# Patient Record
Sex: FEMALE | Race: WHITE | Employment: PART TIME | URBAN - METROPOLITAN AREA
[De-identification: names, ages, dates, MRNs, and addresses within clinical notes are randomized per-mention and may not be internally consistent; named-entity substitution may affect disease eponyms.]

---

## 2018-04-17 ENCOUNTER — APPOINTMENT (OUTPATIENT)
Dept: CT IMAGING | Age: 38
End: 2018-04-17
Attending: EMERGENCY MEDICINE
Payer: COMMERCIAL

## 2018-04-17 ENCOUNTER — APPOINTMENT (OUTPATIENT)
Dept: GENERAL RADIOLOGY | Age: 38
End: 2018-04-17
Attending: EMERGENCY MEDICINE
Payer: COMMERCIAL

## 2018-04-17 ENCOUNTER — HOSPITAL ENCOUNTER (EMERGENCY)
Age: 38
Discharge: HOME | End: 2018-04-17
Attending: EMERGENCY MEDICINE
Payer: COMMERCIAL

## 2018-04-17 VITALS
WEIGHT: 293 LBS | DIASTOLIC BLOOD PRESSURE: 83 MMHG | HEART RATE: 96 BPM | BODY MASS INDEX: 51.91 KG/M2 | TEMPERATURE: 98.2 F | HEIGHT: 63 IN | OXYGEN SATURATION: 96 % | RESPIRATION RATE: 23 BRPM | SYSTOLIC BLOOD PRESSURE: 157 MMHG

## 2018-04-17 DIAGNOSIS — J01.00 ACUTE NON-RECURRENT MAXILLARY SINUSITIS: Primary | ICD-10-CM

## 2018-04-17 LAB
ALBUMIN SERPL-MCNC: 2.7 G/DL (ref 3.5–5)
ALBUMIN/GLOB SERPL: 0.5 {RATIO} (ref 1.1–2.2)
ALP SERPL-CCNC: 63 U/L (ref 45–117)
ALT SERPL-CCNC: 18 U/L (ref 12–78)
ANION GAP SERPL CALC-SCNC: 6 MMOL/L (ref 5–15)
AST SERPL-CCNC: 12 U/L (ref 15–37)
ATRIAL RATE: 95 BPM
BASOPHILS # BLD: 0 K/UL (ref 0–0.1)
BASOPHILS NFR BLD: 0 % (ref 0–1)
BILIRUB SERPL-MCNC: 0.3 MG/DL (ref 0.2–1)
BUN SERPL-MCNC: 6 MG/DL (ref 6–20)
BUN/CREAT SERPL: 8 (ref 12–20)
CALCIUM SERPL-MCNC: 8.9 MG/DL (ref 8.5–10.1)
CALCULATED P AXIS, ECG09: 57 DEGREES
CALCULATED R AXIS, ECG10: 8 DEGREES
CALCULATED T AXIS, ECG11: 44 DEGREES
CHLORIDE SERPL-SCNC: 98 MMOL/L (ref 97–108)
CO2 SERPL-SCNC: 31 MMOL/L (ref 21–32)
CREAT SERPL-MCNC: 0.76 MG/DL (ref 0.55–1.02)
D DIMER PPP FEU-MCNC: 1.86 MG/L FEU (ref 0–0.65)
DIAGNOSIS, 93000: NORMAL
DIFFERENTIAL METHOD BLD: ABNORMAL
EOSINOPHIL # BLD: 0.1 K/UL (ref 0–0.4)
EOSINOPHIL NFR BLD: 1 % (ref 0–7)
ERYTHROCYTE [DISTWIDTH] IN BLOOD BY AUTOMATED COUNT: 16.4 % (ref 11.5–14.5)
GLOBULIN SER CALC-MCNC: 5.6 G/DL (ref 2–4)
GLUCOSE SERPL-MCNC: 103 MG/DL (ref 65–100)
HCT VFR BLD AUTO: 39.2 % (ref 35–47)
HGB BLD-MCNC: 12 G/DL (ref 11.5–16)
LYMPHOCYTES # BLD: 1 K/UL (ref 0.8–3.5)
LYMPHOCYTES NFR BLD: 8 % (ref 12–49)
MCH RBC QN AUTO: 24.3 PG (ref 26–34)
MCHC RBC AUTO-ENTMCNC: 30.6 G/DL (ref 30–36.5)
MCV RBC AUTO: 79.5 FL (ref 80–99)
MONOCYTES # BLD: 0.8 K/UL (ref 0–1)
MONOCYTES NFR BLD: 6 % (ref 5–13)
NEUTS SEG # BLD: 10.8 K/UL (ref 1.8–8)
NEUTS SEG NFR BLD: 85 % (ref 32–75)
P-R INTERVAL, ECG05: 144 MS
PLATELET # BLD AUTO: 400 K/UL (ref 150–400)
PMV BLD AUTO: 9.5 FL (ref 8.9–12.9)
POTASSIUM SERPL-SCNC: 3.3 MMOL/L (ref 3.5–5.1)
PROT SERPL-MCNC: 8.3 G/DL (ref 6.4–8.2)
Q-T INTERVAL, ECG07: 344 MS
QRS DURATION, ECG06: 92 MS
QTC CALCULATION (BEZET), ECG08: 432 MS
RBC # BLD AUTO: 4.93 M/UL (ref 3.8–5.2)
RBC MORPH BLD: ABNORMAL
SODIUM SERPL-SCNC: 135 MMOL/L (ref 136–145)
TROPONIN I BLD-MCNC: 0.06 NG/ML (ref 0–0.08)
VENTRICULAR RATE, ECG03: 95 BPM
WBC # BLD AUTO: 12.7 K/UL (ref 3.6–11)
XXWBCSUS: 1

## 2018-04-17 PROCEDURE — 36415 COLL VENOUS BLD VENIPUNCTURE: CPT | Performed by: EMERGENCY MEDICINE

## 2018-04-17 PROCEDURE — 93005 ELECTROCARDIOGRAM TRACING: CPT

## 2018-04-17 PROCEDURE — 71275 CT ANGIOGRAPHY CHEST: CPT

## 2018-04-17 PROCEDURE — 71046 X-RAY EXAM CHEST 2 VIEWS: CPT

## 2018-04-17 PROCEDURE — 96374 THER/PROPH/DIAG INJ IV PUSH: CPT

## 2018-04-17 PROCEDURE — 96361 HYDRATE IV INFUSION ADD-ON: CPT

## 2018-04-17 PROCEDURE — 85379 FIBRIN DEGRADATION QUANT: CPT | Performed by: EMERGENCY MEDICINE

## 2018-04-17 PROCEDURE — 96375 TX/PRO/DX INJ NEW DRUG ADDON: CPT

## 2018-04-17 PROCEDURE — 74011250636 HC RX REV CODE- 250/636: Performed by: EMERGENCY MEDICINE

## 2018-04-17 PROCEDURE — 99285 EMERGENCY DEPT VISIT HI MDM: CPT

## 2018-04-17 PROCEDURE — 85025 COMPLETE CBC W/AUTO DIFF WBC: CPT | Performed by: EMERGENCY MEDICINE

## 2018-04-17 PROCEDURE — 80053 COMPREHEN METABOLIC PANEL: CPT | Performed by: EMERGENCY MEDICINE

## 2018-04-17 PROCEDURE — 74011636320 HC RX REV CODE- 636/320: Performed by: EMERGENCY MEDICINE

## 2018-04-17 PROCEDURE — 70450 CT HEAD/BRAIN W/O DYE: CPT

## 2018-04-17 PROCEDURE — 84484 ASSAY OF TROPONIN QUANT: CPT

## 2018-04-17 RX ORDER — KETOROLAC TROMETHAMINE 30 MG/ML
30 INJECTION, SOLUTION INTRAMUSCULAR; INTRAVENOUS
Status: COMPLETED | OUTPATIENT
Start: 2018-04-17 | End: 2018-04-17

## 2018-04-17 RX ORDER — AMOXICILLIN AND CLAVULANATE POTASSIUM 875; 125 MG/1; MG/1
1 TABLET, FILM COATED ORAL 2 TIMES DAILY
Qty: 14 TAB | Refills: 0 | Status: SHIPPED | OUTPATIENT
Start: 2018-04-17 | End: 2018-04-24

## 2018-04-17 RX ORDER — DIPHENHYDRAMINE HYDROCHLORIDE 50 MG/ML
25 INJECTION, SOLUTION INTRAMUSCULAR; INTRAVENOUS
Status: COMPLETED | OUTPATIENT
Start: 2018-04-17 | End: 2018-04-17

## 2018-04-17 RX ORDER — METOCLOPRAMIDE HYDROCHLORIDE 5 MG/ML
10 INJECTION INTRAMUSCULAR; INTRAVENOUS
Status: COMPLETED | OUTPATIENT
Start: 2018-04-17 | End: 2018-04-17

## 2018-04-17 RX ORDER — IBUPROFEN 800 MG/1
800 TABLET ORAL
Qty: 20 TAB | Refills: 0 | Status: SHIPPED | OUTPATIENT
Start: 2018-04-17 | End: 2018-04-24

## 2018-04-17 RX ADMIN — IOPAMIDOL 100 ML: 755 INJECTION, SOLUTION INTRAVENOUS at 11:46

## 2018-04-17 RX ADMIN — METOCLOPRAMIDE 10 MG: 5 INJECTION, SOLUTION INTRAMUSCULAR; INTRAVENOUS at 10:26

## 2018-04-17 RX ADMIN — DIPHENHYDRAMINE HYDROCHLORIDE 25 MG: 50 INJECTION, SOLUTION INTRAMUSCULAR; INTRAVENOUS at 10:24

## 2018-04-17 RX ADMIN — SODIUM CHLORIDE 1000 ML: 900 INJECTION, SOLUTION INTRAVENOUS at 10:23

## 2018-04-17 RX ADMIN — KETOROLAC TROMETHAMINE 30 MG: 30 INJECTION, SOLUTION INTRAMUSCULAR at 11:44

## 2018-04-17 NOTE — ED PROVIDER NOTES
Patient is a 40 y.o. female presenting with chest pain and general illness. The history is provided by the patient. Chest Pain    This is a new problem. Episode onset: 1 week ago. The problem has not changed since onset. Associated with: supine. The pain is present in the substernal region. The pain is at a severity of 8/10. The pain is moderate. The quality of the pain is described as pressure-like. The pain does not radiate. The symptoms are aggravated by certain positions. Associated symptoms include a fever, headaches, nausea and shortness of breath. Pertinent negatives include no abdominal pain, no back pain and no vomiting. She has tried nothing for the symptoms. The treatment provided no relief. Risk factors: prior unprovoked PE. Her past medical history is significant for PE. Generalized Body Aches   Associated symptoms include chest pain, headaches and shortness of breath. Pertinent negatives include no abdominal pain. Past Medical History:   Diagnosis Date    Hypertension     Ill-defined condition     PE    Panic attack     Psychiatric disorder     Pulmonary embolism (HCC)     Urticaria        Past Surgical History:   Procedure Laterality Date    HX DILATION AND CURETTAGE           Family History:   Problem Relation Age of Onset    Stroke Other        Social History     Social History    Marital status: SINGLE     Spouse name: N/A    Number of children: N/A    Years of education: N/A     Occupational History    Not on file. Social History Main Topics    Smoking status: Current Every Day Smoker     Packs/day: 0.50    Smokeless tobacco: Never Used    Alcohol use No      Comment: rarely    Drug use: Yes     Special: Marijuana    Sexual activity: Not on file     Other Topics Concern    Not on file     Social History Narrative         ALLERGIES: Hydrocodone    Review of Systems   Constitutional: Positive for fever. Negative for chills.    HENT: Negative for ear pain and sore throat. Eyes: Negative for pain. Respiratory: Positive for shortness of breath. Negative for chest tightness. Cardiovascular: Positive for chest pain. Negative for leg swelling. Gastrointestinal: Positive for nausea. Negative for abdominal pain and vomiting. Genitourinary: Negative for dysuria and flank pain. Musculoskeletal: Negative for back pain. Skin: Negative for rash. Neurological: Positive for headaches. All other systems reviewed and are negative. Vitals:    04/17/18 0948   BP: (!) 160/97   Pulse: 95   Resp: 26   Temp: 98.2 °F (36.8 °C)   SpO2: 98%   Weight: 137 kg (302 lb)   Height: 5' 3\" (1.6 m)            Physical Exam   Constitutional: She is oriented to person, place, and time. She appears well-developed and well-nourished. Obese female   HENT:   Head: Normocephalic and atraumatic. Mouth/Throat: Oropharynx is clear and moist.   Eyes: Conjunctivae and EOM are normal. Pupils are equal, round, and reactive to light. No scleral icterus. Neck: Neck supple. No tracheal deviation present. Cardiovascular: Regular rhythm, normal heart sounds and intact distal pulses. Exam reveals no gallop and no friction rub. No murmur heard. Tachycardia with standing   Pulmonary/Chest: Effort normal and breath sounds normal. No respiratory distress. Abdominal: Soft. She exhibits no distension. There is no tenderness. There is no rebound and no guarding. Genitourinary:   Genitourinary Comments: deferred   Musculoskeletal: She exhibits no edema. Neurological: She is alert and oriented to person, place, and time. No cranial nerve deficit. Coordination normal.   No pronator drift. Negative Romberg   Skin: Skin is warm and dry. Psychiatric: She has a normal mood and affect. Nursing note and vitals reviewed. MDM  Number of Diagnoses or Management Options        ED Course       Procedures      A/P: 41 yo female with h/o PE, obesity presents with headache, chest pain, body aches. D-dimer +, CTA negative. CT head showed maxillary sinus disease and incidental cerebellar tonsil enlargement, discussed with patient. Pain relieved with migraine cocktail.    - Augmentin  - Ibuprofen  - f/u with PCP  - Return to the emergency department for new/ worsening symptoms or other concerns    Cem Valera.  Savanna Bowman MD

## 2018-04-17 NOTE — ED TRIAGE NOTES
Patient presents to treatment area via wheelchair. Patient states that about one week PTA, she developed posterior \"head pressure\". Pain has progressed into all of her joints and she developed a rash. Patient states she began with chest pain about a week ago, also. Pain has increased since onset. Patient states she has not eaten in two days. She has been short of breath and nauseated since symptom onset.

## 2018-04-17 NOTE — ED NOTES
Pt returned from CT . Purposeful rounding done. Pt sitting up on stretcher. Offered assist with any needs. Pt states \"pain level 4 head and no needs at this time. \" Pt medicated with Toradol as ordered. Call bell in reach will continue to monitor.

## 2018-04-17 NOTE — ED NOTES
Plan of care and all test/meds ordered explained to pt. Good understanding and agreement with plan was verbalized. Pt medicated as ordered. Call bell in reach use explained.

## 2018-04-17 NOTE — ED NOTES
Purposeful rounding done. Pt sitting up on stretcher. Offered assist with any needs. Pt states \"pain level 1 and no needs at this time. \" Pt ambulated to the bathroom with a steady gait. Call bell in reach will continue to monitor.

## 2018-04-17 NOTE — DISCHARGE INSTRUCTIONS
Sinusitis: Care Instructions  Your Care Instructions    Sinusitis is an infection of the lining of the sinus cavities in your head. Sinusitis often follows a cold. It causes pain and pressure in your head and face. In most cases, sinusitis gets better on its own in 1 to 2 weeks. But some mild symptoms may last for several weeks. Sometimes antibiotics are needed. Follow-up care is a key part of your treatment and safety. Be sure to make and go to all appointments, and call your doctor if you are having problems. It's also a good idea to know your test results and keep a list of the medicines you take. How can you care for yourself at home? · Take an over-the-counter pain medicine, such as acetaminophen (Tylenol), ibuprofen (Advil, Motrin), or naproxen (Aleve). Read and follow all instructions on the label. · If the doctor prescribed antibiotics, take them as directed. Do not stop taking them just because you feel better. You need to take the full course of antibiotics. · Be careful when taking over-the-counter cold or flu medicines and Tylenol at the same time. Many of these medicines have acetaminophen, which is Tylenol. Read the labels to make sure that you are not taking more than the recommended dose. Too much acetaminophen (Tylenol) can be harmful. · Breathe warm, moist air from a steamy shower, a hot bath, or a sink filled with hot water. Avoid cold, dry air. Using a humidifier in your home may help. Follow the directions for cleaning the machine. · Use saline (saltwater) nasal washes to help keep your nasal passages open and wash out mucus and bacteria. You can buy saline nose drops at a grocery store or drugstore. Or you can make your own at home by adding 1 teaspoon of salt and 1 teaspoon of baking soda to 2 cups of distilled water. If you make your own, fill a bulb syringe with the solution, insert the tip into your nostril, and squeeze gently. Lunmac[place] Parisian your nose.   · Put a hot, wet towel or a warm gel pack on your face 3 or 4 times a day for 5 to 10 minutes each time. · Try a decongestant nasal spray like oxymetazoline (Afrin). Do not use it for more than 3 days in a row. Using it for more than 3 days can make your congestion worse. When should you call for help? Call your doctor now or seek immediate medical care if:  ? · You have new or worse swelling or redness in your face or around your eyes. ? · You have a new or higher fever. ? Watch closely for changes in your health, and be sure to contact your doctor if:  ? · You have new or worse facial pain. ? · The mucus from your nose becomes thicker (like pus) or has new blood in it. ? · You are not getting better as expected. Where can you learn more? Go to http://randi-ned.info/. Enter B776 in the search box to learn more about \"Sinusitis: Care Instructions. \"  Current as of: May 12, 2017  Content Version: 11.4  © 6152-1700 Healthwise, Incorporated. Care instructions adapted under license by [employer] (which disclaims liability or warranty for this information). If you have questions about a medical condition or this instruction, always ask your healthcare professional. [address] any warranty or liability for your use of this information.

## 2018-04-17 NOTE — ED NOTES
Purposeful rounding done. Pt sitting up on stretcher. Offered assist with any needs. Pt states \"pain level 4 and can I have some water? \" Pt given water as requested okayed by Dr Toni Reina. Call bell in reach will continue to monitor.

## 2018-06-03 ENCOUNTER — HOSPITAL ENCOUNTER (EMERGENCY)
Age: 38
Discharge: HOME | End: 2018-06-03
Attending: STUDENT IN AN ORGANIZED HEALTH CARE EDUCATION/TRAINING PROGRAM | Admitting: STUDENT IN AN ORGANIZED HEALTH CARE EDUCATION/TRAINING PROGRAM
Payer: COMMERCIAL

## 2018-06-03 VITALS
HEART RATE: 93 BPM | WEIGHT: 293 LBS | HEIGHT: 63 IN | RESPIRATION RATE: 16 BRPM | BODY MASS INDEX: 51.91 KG/M2 | TEMPERATURE: 98.1 F | DIASTOLIC BLOOD PRESSURE: 76 MMHG | OXYGEN SATURATION: 97 % | SYSTOLIC BLOOD PRESSURE: 145 MMHG

## 2018-06-03 DIAGNOSIS — H10.9 CONJUNCTIVITIS OF RIGHT EYE, UNSPECIFIED CONJUNCTIVITIS TYPE: Primary | ICD-10-CM

## 2018-06-03 PROCEDURE — 74011000250 HC RX REV CODE- 250: Performed by: STUDENT IN AN ORGANIZED HEALTH CARE EDUCATION/TRAINING PROGRAM

## 2018-06-03 PROCEDURE — 99283 EMERGENCY DEPT VISIT LOW MDM: CPT

## 2018-06-03 RX ORDER — LISINOPRIL 10 MG/1
20 TABLET ORAL DAILY
COMMUNITY
End: 2019-12-16

## 2018-06-03 RX ORDER — TETRACAINE HYDROCHLORIDE 5 MG/ML
2 SOLUTION OPHTHALMIC
Status: COMPLETED | OUTPATIENT
Start: 2018-06-03 | End: 2018-06-03

## 2018-06-03 RX ORDER — ERYTHROMYCIN 5 MG/G
OINTMENT OPHTHALMIC
Qty: 1 TUBE | Refills: 0 | Status: SHIPPED | OUTPATIENT
Start: 2018-06-03 | End: 2018-06-10

## 2018-06-03 RX ORDER — FLUOXETINE HYDROCHLORIDE 20 MG/1
60 CAPSULE ORAL DAILY
COMMUNITY
End: 2018-11-01

## 2018-06-03 RX ADMIN — TETRACAINE HYDROCHLORIDE 2 DROP: 5 SOLUTION OPHTHALMIC at 08:49

## 2018-06-03 RX ADMIN — FLUORESCEIN SODIUM 1 STRIP: 0.6 STRIP OPHTHALMIC at 08:49

## 2018-06-03 NOTE — DISCHARGE INSTRUCTIONS
Pinkeye: Care Instructions  Your Care Instructions    Pinkeye is redness and swelling of the eye surface and the conjunctiva (the lining of the eyelid and the covering of the white part of the eye). Pinkeye is also called conjunctivitis. Pinkeye is often caused by infection with bacteria or a virus. Dry air, allergies, smoke, and chemicals are other common causes. Pinkeye often clears on its own in 7 to 10 days. Antibiotics only help if the pinkeye is caused by bacteria. Pinkeye caused by infection spreads easily. If an allergy or chemical is causing pinkeye, it will not go away unless you can avoid whatever is causing it. Follow-up care is a key part of your treatment and safety. Be sure to make and go to all appointments, and call your doctor if you are having problems. It's also a good idea to know your test results and keep a list of the medicines you take. How can you care for yourself at home? · Wash your hands often. Always wash them before and after you treat pinkeye or touch your eyes or face. · Use moist cotton or a clean, wet cloth to remove crust. Wipe from the inside corner of the eye to the outside. Use a clean part of the cloth for each wipe. · Put cold or warm wet cloths on your eye a few times a day if the eye hurts. · Do not wear contact lenses or eye makeup until the pinkeye is gone. Throw away any eye makeup you were using when you got pinkeye. Clean your contacts and storage case. If you wear disposable contacts, use a new pair when your eye has cleared and it is safe to wear contacts again. · If the doctor gave you antibiotic ointment or eyedrops, use them as directed. Use the medicine for as long as instructed, even if your eye starts looking better soon. Keep the bottle tip clean, and do not let it touch the eye area. · To put in eyedrops or ointment:  ¨ Tilt your head back, and pull your lower eyelid down with one finger.   ¨ Drop or squirt the medicine inside the lower lid.  ¨ Close your eye for 30 to 60 seconds to let the drops or ointment move around. ¨ Do not touch the ointment or dropper tip to your eyelashes or any other surface. · Do not share towels, pillows, or washcloths while you have pinkeye. When should you call for help? Call your doctor now or seek immediate medical care if:  ? · You have pain in your eye, not just irritation on the surface. ? · You have a change in vision or loss of vision. ? · You have an increase in discharge from the eye.   ? · Your eye has not started to improve or begins to get worse within 48 hours after you start using antibiotics. ? · Pinkeye lasts longer than 7 days. ? Watch closely for changes in your health, and be sure to contact your doctor if you have any problems. Where can you learn more? Go to http://randi-ned.info/. Enter Y392 in the search box to learn more about \"Pinkeye: Care Instructions. \"  Current as of: March 20, 2017  Content Version: 11.4  © 6084-8160 Healthwise, Incorporated. Care instructions adapted under license by [employer] (which disclaims liability or warranty for this information). If you have questions about a medical condition or this instruction, always ask your healthcare professional. [address] any warranty or liability for your use of this information.

## 2018-06-03 NOTE — ED PROVIDER NOTES
Patient is a 40 y.o. female presenting with eye problem. The history is provided by the patient. Eye Problem    This is a new problem. The current episode started yesterday. The problem occurs constantly. The problem has not changed since onset. The right eye is affected. The injury mechanism was none (woke up from sleep with red, painful R eye). The pain is at a severity of 4/10. The pain is mild. There is no history of trauma to the eye. There is no known exposure to pink eye. She does not wear contacts (supposed to wear glasses but doesn't). Associated symptoms include blurred vision (on R), discharge (clear), foreign body sensation, photophobia, eye redness, itching and pain. Pertinent negatives include no decreased vision, no double vision, no nausea, no vomiting, no fever, no blindness and no head injury. Associated symptoms comments: Denies headache  . She has tried water and eye drops for the symptoms. The treatment provided no relief. Past Medical History:   Diagnosis Date    Hypertension     Ill-defined condition     PE    Panic attack     Psychiatric disorder     Pulmonary embolism (HCC)     Urticaria        Past Surgical History:   Procedure Laterality Date    HX DILATION AND CURETTAGE           Family History:   Problem Relation Age of Onset    Stroke Other        Social History     Social History    Marital status: SINGLE     Spouse name: N/A    Number of children: N/A    Years of education: N/A     Occupational History    Not on file. Social History Main Topics    Smoking status: Current Every Day Smoker     Packs/day: 0.25    Smokeless tobacco: Never Used    Alcohol use No      Comment: rarely    Drug use: Yes     Special: Marijuana    Sexual activity: Not on file     Other Topics Concern    Not on file     Social History Narrative         ALLERGIES: Hydrocodone    Review of Systems   Constitutional: Negative for fever.    HENT: Negative for facial swelling, mouth sores, rhinorrhea and sore throat. Eyes: Positive for blurred vision (on R), photophobia, pain, discharge (clear), redness, itching and visual disturbance (blurred vision on R). Negative for blindness and double vision. Denies diplopia     Cardiovascular: Negative for chest pain and leg swelling. Gastrointestinal: Negative for abdominal pain, nausea and vomiting. Musculoskeletal: Negative for arthralgias and joint swelling. Skin: Positive for itching. Neurological: Negative for headaches. All other systems reviewed and are negative. Vitals:    06/03/18 0832   BP: 145/76   Pulse: 93   Resp: 16   Temp: 98.1 °F (36.7 °C)   SpO2: 97%   Weight: 135 kg (297 lb 9.9 oz)   Height: 5' 3\" (1.6 m)            Physical Exam   Constitutional: She is oriented to person, place, and time. She appears well-developed. No distress. HENT:   Head: Normocephalic and atraumatic. Eyes: EOM are normal. Pupils are equal, round, and reactive to light. Right eye exhibits chemosis and discharge (clear). Right eye exhibits no exudate. No foreign body present in the right eye. Right conjunctiva is injected. Right conjunctiva has no hemorrhage. Left conjunctiva is not injected. Left conjunctiva has no hemorrhage. Slit lamp exam:       The right eye shows no hyphema. Neck: Normal range of motion. Neck supple. Cardiovascular: Normal rate, regular rhythm and normal heart sounds. No murmur heard. Pulmonary/Chest: Effort normal and breath sounds normal. No respiratory distress. Abdominal: Soft. Bowel sounds are normal. She exhibits no distension. There is no tenderness. There is no rebound. Musculoskeletal: Normal range of motion. She exhibits no edema. Neurological: She is alert and oriented to person, place, and time. No cranial nerve deficit. She exhibits normal muscle tone. Coordination normal.   Skin: Skin is warm and dry. No rash noted. Psychiatric: She has a normal mood and affect.  Her behavior is normal. Nursing note and vitals reviewed. [facility]      ED Course       Eye Stain    Date/Time: 6/3/2018 8:59 AM    Performed by: attending        Corneal abrasion was not present on eyelid eversion. Cornea is clear. Patient tolerance: Patient tolerated the procedure well with no immediate complications  My total time at bedside, performing this procedure was 1-15 minutes. Comments: Tetracaine 0.5% applied. No signs of acute glaucoma or orbital cellulitis. Will need ophtho follow up.

## 2018-06-03 NOTE — ED TRIAGE NOTES
Patient ambulatory to ED treatment area with steady gait for complaint of \"two days ago my right eye became red. I thought it may be pink eye but it does not feel like it. \" Pt denies crusting of the eye. Reports blurriness to right eye. Pressure to right eye reported. Photosensitivity in right eye. Pressure also to right side of face.

## 2018-06-03 NOTE — LETTER
[facility] EMERGENCY DEPT 
[address] [id number] 
[phone] Work/School Note Date: 6/3/2018 To Whom It May concern: 
 
Cody Patel was seen and treated today in the emergency room by the following provider(s): 
Attending Provider: John Sanders MD. Cody Patel may return to work on 6/5/2018.  
 
Sincerely, 
 
 
 
 
John Sanders MD

## 2018-11-01 ENCOUNTER — HOSPITAL ENCOUNTER (EMERGENCY)
Age: 38
Discharge: HOME | End: 2018-11-01
Attending: EMERGENCY MEDICINE
Payer: SELF-PAY

## 2018-11-01 VITALS
OXYGEN SATURATION: 98 % | SYSTOLIC BLOOD PRESSURE: 160 MMHG | RESPIRATION RATE: 18 BRPM | TEMPERATURE: 98.3 F | DIASTOLIC BLOOD PRESSURE: 72 MMHG | HEART RATE: 97 BPM | WEIGHT: 293 LBS | BODY MASS INDEX: 58.07 KG/M2

## 2018-11-01 DIAGNOSIS — K08.89 PAIN, DENTAL: ICD-10-CM

## 2018-11-01 DIAGNOSIS — G50.1 ATYPICAL FACE PAIN: Primary | ICD-10-CM

## 2018-11-01 PROCEDURE — 74011000250 HC RX REV CODE- 250: Performed by: EMERGENCY MEDICINE

## 2018-11-01 PROCEDURE — 99283 EMERGENCY DEPT VISIT LOW MDM: CPT

## 2018-11-01 PROCEDURE — 74011250637 HC RX REV CODE- 250/637: Performed by: EMERGENCY MEDICINE

## 2018-11-01 RX ORDER — CLINDAMYCIN HYDROCHLORIDE 300 MG/1
300 CAPSULE ORAL 4 TIMES DAILY
Qty: 40 CAP | Refills: 0 | Status: SHIPPED | OUTPATIENT
Start: 2018-11-01 | End: 2018-12-02

## 2018-11-01 RX ORDER — CLINDAMYCIN HYDROCHLORIDE 150 MG/1
300 CAPSULE ORAL
Status: COMPLETED | OUTPATIENT
Start: 2018-11-01 | End: 2018-11-01

## 2018-11-01 RX ORDER — TRAMADOL HYDROCHLORIDE 50 MG/1
50 TABLET ORAL
Qty: 12 TAB | Refills: 0 | Status: SHIPPED | OUTPATIENT
Start: 2018-11-01 | End: 2018-11-11

## 2018-11-01 RX ADMIN — LIDOCAINE HYDROCHLORIDE: 20 SOLUTION ORAL; TOPICAL at 17:14

## 2018-11-01 RX ADMIN — CLINDAMYCIN HYDROCHLORIDE 300 MG: 150 CAPSULE ORAL at 17:13

## 2018-11-01 NOTE — ED PROVIDER NOTES
40 yo female with htn presents with c/o left sided face pain for a week. Reports breaking wisdom tooth on bottom a month ago and recently has been having pain and swelling. No fevers. Has shi taking motrin with no relief. She is not diabetic. She called her dentist but they couldn't see her until december + smoker Denies chance of pregnancy Past Medical History:  
Diagnosis Date  Hypertension  Ill-defined condition PE  
 Panic attack  Psychiatric disorder  Pulmonary embolism ([facility] [address]  Urticaria Past Surgical History:  
Procedure Laterality Date  HX DILATION AND CURETTAGE Family History:  
Problem Relation Age of Onset  Stroke Other Social History Socioeconomic History  Marital status: SINGLE Spouse name: Not on file  Number of children: Not on file  Years of education: Not on file  Highest education level: Not on file Social Needs  Financial resource strain: Not on file  Food insecurity - worry: Not on file  Food insecurity - inability: Not on file  Transportation needs - medical: Not on file  Transportation needs - non-medical: Not on file Occupational History  Not on file Tobacco Use  Smoking status: Current Every Day Smoker Packs/day: 0.25  Smokeless tobacco: Never Used Substance and Sexual Activity  Alcohol use: No  
  Comment: rarely  Drug use: Yes Types: Marijuana  Sexual activity: Not on file Other Topics Concern  Not on file Social History Narrative  Not on file ALLERGIES: Hydrocodone Review of Systems Constitutional: Negative for fever. HENT: Positive for dental problem. Genitourinary:  
     Denies chance of pregnancy All other systems reviewed and are negative. Vitals:  
 11/01/18 1634 BP: 160/72 Pulse: 97 Resp: 18 Temp: 98.3 °F (36.8 °C) SpO2: 98% Weight: 148.7 kg (327 lb 13.2 oz) Physical Exam  
 Constitutional: She is oriented to person, place, and time. She appears well-developed and well-nourished. HENT:  
Head: Normocephalic and atraumatic. Right Ear: External ear normal.  
Left Ear: External ear normal.  
No trismus; floor of mouth soft; tooth 17 fractured no surrounding gum erythema Eyes: Conjunctivae are normal.  
Neck: Normal range of motion. Cardiovascular: Normal rate, regular rhythm and normal heart sounds. Pulmonary/Chest: Effort normal and breath sounds normal.  
Abdominal: Soft. Bowel sounds are normal. She exhibits no distension. There is no tenderness. Musculoskeletal: Normal range of motion. Neurological: She is alert and oriented to person, place, and time. Skin: Skin is warm and dry. Nursing note and vitals reviewed. MDM Number of Diagnoses or Management Options Atypical face pain:  
Pain, dental:  
Diagnosis management comments: Discussed with pt treat with abx, pain control, needs tooth extracted. Give list of dental clinics for follow up and disucssed returning if she develops swelling to lower jaw, trouble opening mouth etc 
 
Patient Progress Patient progress: stable Procedures

## 2018-11-01 NOTE — DISCHARGE INSTRUCTIONS
Emergency [address] by DIANA HOWE [facility]  [address] [facility], [address] [facility] [place]  Open M, W, F: 8AM - 5PM and T, Th: 8AM-6PM  Phone: [phone], press 4  $70 for Emergency Care  $60 for first routine care, then pay by sliding scale based upon income. [facility]  [address]  Phone: [phone]    [employer]  [address] [facility]  Open Monday - Friday 8AM - 4:30 PM  Phone: [address] [place] [address] Dentistry Urgent [address] [place] [address] Dentistry, [address] [place] [address], [place] [address], [address] [place] [address] starting at 8:30 AM M-F  Phone: [phone], press 2  Fee: $150 per tooth (x-ray & extractions only)  Pediatrics Phone[de-identified] [phone], 8-5 M-F    [address] Dentistry, [address] [facility], [facility], 2nd Floor, [address] [place] [address] starting at 8:30 AM - 3 PM [address] [place] [address] St  225 [facility], [address] [place] [address]  Phone: [phone] or [phone]  Emergency Hours: 9:30AM - 11AM (extractions)  Simple tooth extraction $ per tooth. #75 for x-ray    [facility] Residents only, over the age of 25  Phone: [phone]. Leave message saying you need an appointment to register. Hours: Tuesday Evenings     Head or Face Pain: Care Instructions  Your Care Instructions    Common causes of head or face pain are allergies, stress, and injuries. Other causes include tooth problems and sinus infections. Eating certain foods, such as chocolate or cheese, or drinking certain liquids, such as coffee or cola, can cause head pain for some people. If you have mild head pain, you may not need treatment. It is important to watch your symptoms and talk to your doctor if your pain continues or gets worse. Follow-up care is a key part of your treatment and safety. Be sure to make and go to all appointments, and call your doctor if you are having problems. It's also a good idea to know your test results and keep a list of the medicines you take. How can you care for yourself at home? · Take pain medicines exactly as directed. ? If the doctor gave you a prescription medicine for pain, take it as prescribed. ? If you are not taking a prescription pain medicine, ask your doctor if you can take an over-the-counter pain medicine. · Take it easy for the next few days or longer if you are not feeling well. · Use a warm, moist towel or heating pad set on low to relax tight muscles in your shoulder and neck. Have someone gently massage your neck and shoulders. · Put ice or a cold pack on the area for 10 to 20 minutes at a time. Put a thin cloth between the ice and your skin. When should you call for help? Call 911 anytime you think you may need emergency care. For example, call if:    · You have twitching, jerking, or a seizure.     · You passed out (lost consciousness).     · You have symptoms of a stroke. These may include:  ? Sudden numbness, tingling, weakness, or loss of movement in your face, arm, or leg, especially on only one side of your body. ? Sudden vision changes. ? Sudden trouble speaking. ? Sudden confusion or trouble understanding simple statements. ? Sudden problems with walking or balance. ? A sudden, severe headache that is different from past headaches.     · You have jaw pain and pain in your chest, shoulder, neck, or arm.    Call your doctor now or seek immediate medical care if:    · You have a fever with a stiff neck or a severe headache.     · You have nausea and vomiting, or you cannot keep food or liquids down.    Watch closely for changes in your health, and be sure to contact your doctor if:    · Your head or face pain does not get better as expected. Where can you learn more? Go to http://randi-ned.info/.   Enter L876 in the search box to learn more about \"Head or Face Pain: Care Instructions. \"  Current as of: November 20, 2017  Content Version: 11.8  © 1758-2988 [employer]. Care instructions adapted under license by [employer] (which disclaims liability or warranty for this information). If you have questions about a medical condition or this instruction, always ask your healthcare professional. [facility] [address] any warranty or liability for your use of this information. Tooth and Gum Pain: Care Instructions  Your Care Instructions    The most common causes of dental pain are tooth decay and gum disease. Pain can also be caused by an infection of the tooth (abscess) or the gums. Or you may have pain from a broken or cracked tooth. Other causes of pain include infection and damage to a tooth from nervous grinding of your teeth. A wisdom tooth can be painful when it is coming in but cannot break through the gum. It can also be painful when the tooth is only partway in and extra gum tissue has formed around it. The tissue can get inflamed (pericoronitis), and sometimes it gets infected. Prompt dental care can help find the cause of your toothache and keep the tooth from dying or gum disease from getting worse. Self-care at home may reduce your pain and discomfort. Follow-up care is a key part of your treatment and safety. Be sure to make and go to all appointments, and call your dentist or doctor if you are having problems. It's also a good idea to know your test results and keep a list of the medicines you take. How can you care for yourself at home? · To reduce pain and facial swelling, put an ice or cold pack on the outside of your cheek for 10 to 20 minutes at a time. Put a thin cloth between the ice and your skin. Do not use heat. · If your doctor prescribed antibiotics, take them as directed. Do not stop taking them just because you feel better.  You need to take the full course of antibiotics. · Ask your doctor if you can take an over-the-counter pain medicine, such as acetaminophen (Tylenol), ibuprofen (Advil, Motrin), or naproxen (Aleve). Be safe with medicines. Read and follow all instructions on the label. · Avoid very hot, cold, or sweet foods and drinks if they increase your pain. · Rinse your mouth with warm salt water every 2 hours to help relieve pain and swelling. Mix 1 teaspoon of salt in 8 ounces of water. · Talk to your dentist about using special toothpaste for sensitive teeth. To reduce pain on contact with heat or cold or when brushing, brush with this toothpaste regularly or rub a small amount of the paste on the sensitive area with a clean finger 2 or 3 times a day. Floss gently between your teeth. · Do not smoke or use spit tobacco. Tobacco use can make gum problems worse, decreases your ability to fight infection in your gums, and delays healing. If you need help quitting, talk to your doctor about stop-smoking programs and medicines. These can increase your chances of quitting for good. When should you call for help? Call 911 anytime you think you may need emergency care. For example, call if:    · You have trouble breathing.    Call your dentist or doctor now or seek immediate medical care if:    · You have signs of infection, such as:  ? Increased pain, swelling, warmth, or redness. ? Red streaks leading from the area. ? Pus draining from the area. ? A fever.    Watch closely for changes in your health, and be sure to contact your doctor if:    · You do not get better as expected. Where can you learn more? Go to http://randi-ned.info/. Enter [id number] [id number] in the search box to learn more about \"Tooth and Gum Pain: Care Instructions. \"  Current as of: March 28, 2018  Content Version: 11.8  © 3302-4305 [employer].  Care instructions adapted under license by [employer] (which disclaims liability or warranty for this information). If you have questions about a medical condition or this instruction, always ask your healthcare professional. [place] [address] any warranty or liability for your use of this information.

## 2018-12-02 ENCOUNTER — APPOINTMENT (OUTPATIENT)
Dept: GENERAL RADIOLOGY | Age: 38
End: 2018-12-02
Attending: PHYSICIAN ASSISTANT
Payer: SELF-PAY

## 2018-12-02 ENCOUNTER — HOSPITAL ENCOUNTER (EMERGENCY)
Age: 38
Discharge: HOME | End: 2018-12-02
Attending: EMERGENCY MEDICINE
Payer: SELF-PAY

## 2018-12-02 VITALS
HEART RATE: 93 BPM | RESPIRATION RATE: 15 BRPM | WEIGHT: 293 LBS | TEMPERATURE: 98.2 F | DIASTOLIC BLOOD PRESSURE: 79 MMHG | OXYGEN SATURATION: 97 % | BODY MASS INDEX: 51.91 KG/M2 | SYSTOLIC BLOOD PRESSURE: 141 MMHG | HEIGHT: 63 IN

## 2018-12-02 DIAGNOSIS — J20.9 ACUTE BRONCHITIS, UNSPECIFIED ORGANISM: Primary | ICD-10-CM

## 2018-12-02 DIAGNOSIS — F17.200 TOBACCO USE DISORDER: ICD-10-CM

## 2018-12-02 DIAGNOSIS — K08.89 DENTALGIA: ICD-10-CM

## 2018-12-02 PROCEDURE — 74011000250 HC RX REV CODE- 250: Performed by: PHYSICIAN ASSISTANT

## 2018-12-02 PROCEDURE — 99285 EMERGENCY DEPT VISIT HI MDM: CPT

## 2018-12-02 PROCEDURE — 77030013140 HC MSK NEB VYRM -A

## 2018-12-02 PROCEDURE — 71046 X-RAY EXAM CHEST 2 VIEWS: CPT

## 2018-12-02 PROCEDURE — 74011250637 HC RX REV CODE- 250/637: Performed by: PHYSICIAN ASSISTANT

## 2018-12-02 PROCEDURE — 74011636637 HC RX REV CODE- 636/637: Performed by: PHYSICIAN ASSISTANT

## 2018-12-02 PROCEDURE — 93005 ELECTROCARDIOGRAM TRACING: CPT

## 2018-12-02 PROCEDURE — 94640 AIRWAY INHALATION TREATMENT: CPT

## 2018-12-02 RX ORDER — PREDNISONE 20 MG/1
60 TABLET ORAL
Status: COMPLETED | OUTPATIENT
Start: 2018-12-02 | End: 2018-12-02

## 2018-12-02 RX ORDER — IBUPROFEN 200 MG
400 CAPSULE ORAL
COMMUNITY
End: 2019-11-17

## 2018-12-02 RX ORDER — BENZONATATE 100 MG/1
100 CAPSULE ORAL
Qty: 20 CAP | Refills: 0 | Status: SHIPPED | OUTPATIENT
Start: 2018-12-02 | End: 2019-12-16

## 2018-12-02 RX ORDER — PREDNISONE 50 MG/1
50 TABLET ORAL
Qty: 4 TAB | Refills: 0 | Status: SHIPPED | OUTPATIENT
Start: 2018-12-02 | End: 2018-12-06

## 2018-12-02 RX ORDER — ALBUTEROL SULFATE 90 UG/1
2 AEROSOL, METERED RESPIRATORY (INHALATION)
Qty: 1 INHALER | Refills: 0 | Status: SHIPPED | OUTPATIENT
Start: 2018-12-02 | End: 2020-06-12 | Stop reason: ALTCHOICE

## 2018-12-02 RX ADMIN — ALBUTEROL SULFATE 1 DOSE: 2.5 SOLUTION RESPIRATORY (INHALATION) at 14:51

## 2018-12-02 RX ADMIN — LIDOCAINE HYDROCHLORIDE: 20 SOLUTION ORAL; TOPICAL at 15:37

## 2018-12-02 RX ADMIN — PREDNISONE 60 MG: 20 TABLET ORAL at 14:51

## 2018-12-02 NOTE — ED NOTES
Pt receiving breathing treatment, tolerating well. Pt on cardiac monitor x 4. Call bell within reach, will continue to monitor.

## 2018-12-02 NOTE — ED TRIAGE NOTES
\"last couple of days been coughing and wheezing. I haven't slept in 36 hours. \"  Non productive cough. + nasal congestion. Taking mucinex x 2 days. Core throat and low grade fever

## 2018-12-02 NOTE — ED PROVIDER NOTES
Thor Douglas is a 41 y.o. female smoker with PMH of PNA in the past, PE- completed 1 year of Xarelto, NSETMI presents to emergency room ambulatory for evaluation of non-productive cough, wheezing, nasal congestion and sore throat with coughing x 3 days. Her family members children who live next door were sick with URI sx's a few days before her sx's began. She denies fever, chills, vomiting, diarrhea, CP, SOB. Has been taking Mucinex the past 2 days. She denies hx of asthma. She states her sx's do not feel similar to her previous PE. LMP- began last week, stopped yesterday PCP: Pasha Boyer MD 
 
Surgical hx- D&C, colonoscopy Social hx- + tobacco, no recreational drug use The patient has no other complaints at this time. Past Medical History:  
Diagnosis Date  Hypertension  Ill-defined condition PE  
 Panic attack  Pneumonia  Psychiatric disorder  Pulmonary embolism ([facility] [address]  Urticaria Past Surgical History:  
Procedure Laterality Date  HX DILATION AND CURETTAGE Family History:  
Problem Relation Age of Onset  Stroke Other Social History Socioeconomic History  Marital status: SINGLE Spouse name: Not on file  Number of children: Not on file  Years of education: Not on file  Highest education level: Not on file Social Needs  Financial resource strain: Not on file  Food insecurity - worry: Not on file  Food insecurity - inability: Not on file  Transportation needs - medical: Not on file  Transportation needs - non-medical: Not on file Occupational History  Not on file Tobacco Use  Smoking status: Current Some Day Smoker Packs/day: 0.25  Smokeless tobacco: Never Used Substance and Sexual Activity  Alcohol use: No  
  Comment: rarely  Drug use: No  
 Sexual activity: Not on file Other Topics Concern  Not on file Social History Narrative  Not on file ALLERGIES: Hydrocodone Review of Systems Constitutional: Negative. Negative for activity change, chills, fatigue and unexpected weight change. HENT: Positive for congestion, ear pain (left), postnasal drip and rhinorrhea. Negative for trouble swallowing. Respiratory: Positive for cough and wheezing. Negative for chest tightness and shortness of breath. Cardiovascular: Negative. Negative for chest pain and palpitations. Gastrointestinal: Negative. Negative for abdominal pain, diarrhea, nausea and vomiting. Genitourinary: Negative. Negative for dysuria, flank pain, frequency and hematuria. Musculoskeletal: Negative. Negative for arthralgias, back pain, neck pain and neck stiffness. Skin: Negative. Negative for color change and rash. Neurological: Negative. Negative for dizziness, numbness and headaches. All other systems reviewed and are negative. Vitals:  
 12/02/18 1445 12/02/18 1500 12/02/18 1521 12/02/18 1530 BP: 126/63 148/78 134/76 141/79 Pulse: 89 92 99 93 Resp: 19 14 21 15 Temp:      
SpO2: 98% 96% 98% 97% Weight:      
Height:      
      
 
Physical Exam  
Constitutional: She is oriented to person, place, and time. She appears well-developed and well-nourished. She is active. Non-toxic appearance. No distress. Elevated BMI HENT:  
Head: Normocephalic and atraumatic. Right Ear: External ear normal.  
Left Ear: External ear normal.  
Mouth/Throat: Oropharynx is clear and moist.  
 
 
Eyes: Conjunctivae are normal. Pupils are equal, round, and reactive to light. Right eye exhibits no discharge. Left eye exhibits no discharge. Neck: Normal range of motion and full passive range of motion without pain. No tracheal tenderness present. Cardiovascular: Normal rate, regular rhythm, normal heart sounds, intact distal pulses and normal pulses. Exam reveals no gallop and no friction rub. No murmur heard. Pulmonary/Chest: Effort normal. No respiratory distress. She has wheezes (expiratory wheezing throughout; speaking in full complete sentences). She has no rales. She exhibits no tenderness. Occasional spastic barky cough Abdominal: Soft. Bowel sounds are normal. She exhibits no distension. There is no tenderness. There is no rebound and no guarding. Musculoskeletal: Normal range of motion. She exhibits no edema or tenderness. Neurological: She is alert and oriented to person, place, and time. She has normal strength. No cranial nerve deficit or sensory deficit. Coordination normal.  
Skin: Skin is warm, dry and intact. Capillary refill takes less than 2 seconds. No abrasion and no rash noted. She is not diaphoretic. No erythema. Psychiatric: She has a normal mood and affect. Her speech is normal and behavior is normal. Cognition and memory are normal.  
Nursing note and vitals reviewed. MDM Number of Diagnoses or Management Options Acute bronchitis, unspecified organism:  
Dentalgia:  
Tobacco use disorder:  
Diagnosis management comments:  
Ddx: PNA, viral syndrome, bronchitis; dentalgia Amount and/or Complexity of Data Reviewed Clinical lab tests: ordered and reviewed Tests in the radiology section of CPT®: ordered and reviewed Review and summarize past medical records: yes Discuss the patient with other providers: yes Independent visualization of images, tracings, or specimens: yes Patient Progress Patient progress: stable Procedures I discussed patient's PMH, exam findings as well as careplan with the ER attending who agrees with care plan. Dr. Alyssa Brito also saw patient and agrees with care plan. Ada Abebe PA-C 
 
 
EKG interpretation:  
Rhythm: normal sinus rhythm; and regular . Rate (approx.): 82; Axis: normal; P wave: normal; QRS interval: normal ; ST/T wave: non-specific changes; Other findings: borderline ekg. Unchanged from EKG 4/17/18. Interpreted by Dr. Aaron Nelson 3:29 PM 
Patient re-assessed, feeling much better. Wheezing has resolved, lungs are CTA with mild congestion noted only with coughing. Awaiting XR report. Also asking for me to evaluate #17 tooth, was seen on 11/[facility] at [facility] ER for dentaliga / dental abscess and was discharged with dental balls and clindamycin which she completed with resolution. Has dental extraction scheduled for 12/13. Asking for more dental balls until follow-up. Denies return of swelling- tooth with fracture, no signs of abscess, no facial swelling or trismus. Alicia Dong PA-C 
 
 
 
 
 
MEDICATIONS GIVEN: 
Medications  
albuterol 5mg / ipratropium 0.5mg neb solution (1 Dose Nebulization Given 12/2/18 3571) predniSONE (DELTASONE) tablet 60 mg (60 mg Oral Given 12/2/18 7831) dental ball (lidocaine/benadryl/benzocaine) mixture ( Mucous Membrane Given 12/2/18 5427) DISCHARGE NOTE: 
4pm 
The patient's results have been reviewed with them and/or available family. Patient and/or family verbally conveyed their understanding and agreement of the patient's signs, symptoms, diagnosis, treatment and prognosis and additionally agree to follow up as recommended in the discharge instructions or to return to the Emergency Room should their condition change prior to their follow-up appointment. The patient/family verbally agrees with the care-plan and verbally conveys that all of their questions have been answered. The discharge instructions have also been provided to the patient and/or family with some educational information regarding the patient's diagnosis as well a list of reasons why the patient would want to return to the ER prior to their follow-up appointment, should their condition change. Plan: 
1. F/U with PCP 2. Return precautions discussed and advised to return to ER if worse

## 2018-12-02 NOTE — DISCHARGE INSTRUCTIONS
Bronchitis: Care Instructions  Your Care Instructions    Bronchitis is inflammation of the bronchial tubes, which carry air to the lungs. The tubes swell and produce mucus, or phlegm. The mucus and inflamed bronchial tubes make you cough. You may have trouble breathing. Most cases of bronchitis are caused by viruses like those that cause colds. Antibiotics usually do not help and they may be harmful. Bronchitis usually develops rapidly and lasts about 2 to 3 weeks in otherwise healthy people. Follow-up care is a key part of your treatment and safety. Be sure to make and go to all appointments, and call your doctor if you are having problems. It's also a good idea to know your test results and keep a list of the medicines you take. How can you care for yourself at home? · Take all medicines exactly as prescribed. Call your doctor if you think you are having a problem with your medicine. · Get some extra rest.  · Take an over-the-counter pain medicine, such as acetaminophen (Tylenol), ibuprofen (Advil, Motrin), or naproxen (Aleve) to reduce fever and relieve body aches. Read and follow all instructions on the label. · Do not take two or more pain medicines at the same time unless the doctor told you to. Many pain medicines have acetaminophen, which is Tylenol. Too much acetaminophen (Tylenol) can be harmful. · Take an over-the-counter cough medicine that contains dextromethorphan to help quiet a dry, hacking cough so that you can sleep. Avoid cough medicines that have more than one active ingredient. Read and follow all instructions on the label. · Breathe moist air from a humidifier, hot shower, or sink filled with hot water. The heat and moisture will thin mucus so you can cough it out. · Do not smoke. Smoking can make bronchitis worse. If you need help quitting, talk to your doctor about stop-smoking programs and medicines. These can increase your chances of quitting for good.   When should you call for help? Call 911 anytime you think you may need emergency care. For example, call if:    · You have severe trouble breathing.    Call your doctor now or seek immediate medical care if:    · You have new or worse trouble breathing.     · You cough up dark brown or bloody mucus (sputum).     · You have a new or higher fever.     · You have a new rash.    Watch closely for changes in your health, and be sure to contact your doctor if:    · You cough more deeply or more often, especially if you notice more mucus or a change in the color of your mucus.     · You are not getting better as expected. Where can you learn more? Go to http://randi-ned.info/. Enter H333 in the search box to learn more about \"Bronchitis: Care Instructions. \"  Current as of: December 6, 2017  Content Version: 11.8  © 9855-7691 [employer]. Care instructions adapted under license by [employer] (which disclaims liability or warranty for this information). If you have questions about a medical condition or this instruction, always ask your healthcare professional. [place] [address] any warranty or liability for your use of this information. Tooth and Gum Pain: Care Instructions  Your Care Instructions    The most common causes of dental pain are tooth decay and gum disease. Pain can also be caused by an infection of the tooth (abscess) or the gums. Or you may have pain from a broken or cracked tooth. Other causes of pain include infection and damage to a tooth from nervous grinding of your teeth. A wisdom tooth can be painful when it is coming in but cannot break through the gum. It can also be painful when the tooth is only partway in and extra gum tissue has formed around it. The tissue can get inflamed (pericoronitis), and sometimes it gets infected.   Prompt dental care can help find the cause of your toothache and keep the tooth from dying or gum disease from getting worse. Self-care at home may reduce your pain and discomfort. Follow-up care is a key part of your treatment and safety. Be sure to make and go to all appointments, and call your dentist or doctor if you are having problems. It's also a good idea to know your test results and keep a list of the medicines you take. How can you care for yourself at home? · To reduce pain and facial swelling, put an ice or cold pack on the outside of your cheek for 10 to 20 minutes at a time. Put a thin cloth between the ice and your skin. Do not use heat. · If your doctor prescribed antibiotics, take them as directed. Do not stop taking them just because you feel better. You need to take the full course of antibiotics. · Ask your doctor if you can take an over-the-counter pain medicine, such as acetaminophen (Tylenol), ibuprofen (Advil, Motrin), or naproxen (Aleve). Be safe with medicines. Read and follow all instructions on the label. · Avoid very hot, cold, or sweet foods and drinks if they increase your pain. · Rinse your mouth with warm salt water every 2 hours to help relieve pain and swelling. Mix 1 teaspoon of salt in 8 ounces of water. · Talk to your dentist about using special toothpaste for sensitive teeth. To reduce pain on contact with heat or cold or when brushing, brush with this toothpaste regularly or rub a small amount of the paste on the sensitive area with a clean finger 2 or 3 times a day. Floss gently between your teeth. · Do not smoke or use spit tobacco. Tobacco use can make gum problems worse, decreases your ability to fight infection in your gums, and delays healing. If you need help quitting, talk to your doctor about stop-smoking programs and medicines. These can increase your chances of quitting for good. When should you call for help? Call 911 anytime you think you may need emergency care.  For example, call if:    · You have trouble breathing.    Call your dentist or doctor now or seek immediate medical care if:    · You have signs of infection, such as:  ? Increased pain, swelling, warmth, or redness. ? Red streaks leading from the area. ? Pus draining from the area. ? A fever.    Watch closely for changes in your health, and be sure to contact your doctor if:    · You do not get better as expected. Where can you learn more? Go to http://randi-ned.info/. Enter [id number] [id number] in the search box to learn more about \"Tooth and Gum Pain: Care Instructions. \"  Current as of: March 28, 2018  Content Version: 11.8  © 4492-0163 [employer]. Care instructions adapted under license by [employer] (which disclaims liability or warranty for this information). If you have questions about a medical condition or this instruction, always ask your healthcare professional. [address] any warranty or liability for your use of this information.

## 2018-12-02 NOTE — ED NOTES
Patient discharged home after receiving discharge instructions from MD/PA. Patient voiced understanding and doesn't have any questions at this time. Patient in no distress at this time. Pt feeling better. Ambulated out of the ER. Lungs now clear

## 2018-12-03 LAB
ATRIAL RATE: 82 BPM
CALCULATED P AXIS, ECG09: 39 DEGREES
CALCULATED R AXIS, ECG10: 0 DEGREES
CALCULATED T AXIS, ECG11: 28 DEGREES
DIAGNOSIS, 93000: NORMAL
P-R INTERVAL, ECG05: 148 MS
Q-T INTERVAL, ECG07: 394 MS
QRS DURATION, ECG06: 98 MS
QTC CALCULATION (BEZET), ECG08: 460 MS
VENTRICULAR RATE, ECG03: 82 BPM

## 2019-11-17 ENCOUNTER — HOSPITAL ENCOUNTER (EMERGENCY)
Age: 39
Discharge: HOME | End: 2019-11-17
Attending: STUDENT IN AN ORGANIZED HEALTH CARE EDUCATION/TRAINING PROGRAM
Payer: MEDICAID

## 2019-11-17 VITALS
DIASTOLIC BLOOD PRESSURE: 77 MMHG | RESPIRATION RATE: 18 BRPM | HEART RATE: 66 BPM | HEIGHT: 63 IN | SYSTOLIC BLOOD PRESSURE: 106 MMHG | TEMPERATURE: 98.1 F | BODY MASS INDEX: 51.91 KG/M2 | OXYGEN SATURATION: 96 % | WEIGHT: 293 LBS

## 2019-11-17 DIAGNOSIS — K21.9 GASTROESOPHAGEAL REFLUX DISEASE, ESOPHAGITIS PRESENCE NOT SPECIFIED: ICD-10-CM

## 2019-11-17 DIAGNOSIS — M25.512 TRIGGER POINT OF LEFT SHOULDER REGION: ICD-10-CM

## 2019-11-17 DIAGNOSIS — E87.6 HYPOKALEMIA: ICD-10-CM

## 2019-11-17 DIAGNOSIS — R07.89 MUSCULOSKELETAL CHEST PAIN: Primary | ICD-10-CM

## 2019-11-17 LAB
ANION GAP SERPL CALC-SCNC: 9 MMOL/L (ref 5–15)
BASOPHILS # BLD: 0 K/UL (ref 0–0.1)
BASOPHILS NFR BLD: 0 % (ref 0–1)
BUN SERPL-MCNC: 12 MG/DL (ref 6–20)
BUN/CREAT SERPL: 15 (ref 12–20)
CALCIUM SERPL-MCNC: 10.1 MG/DL (ref 8.5–10.1)
CHLORIDE SERPL-SCNC: 101 MMOL/L (ref 97–108)
CO2 SERPL-SCNC: 28 MMOL/L (ref 21–32)
COMMENT, HOLDF: NORMAL
CREAT SERPL-MCNC: 0.78 MG/DL (ref 0.55–1.02)
D DIMER PPP FEU-MCNC: 0.34 MG/L FEU (ref 0–0.65)
DIFFERENTIAL METHOD BLD: ABNORMAL
EOSINOPHIL # BLD: 0.1 K/UL (ref 0–0.4)
EOSINOPHIL NFR BLD: 1 % (ref 0–7)
ERYTHROCYTE [DISTWIDTH] IN BLOOD BY AUTOMATED COUNT: 15.4 % (ref 11.5–14.5)
GLUCOSE SERPL-MCNC: 100 MG/DL (ref 65–100)
HCT VFR BLD AUTO: 42.8 % (ref 35–47)
HGB BLD-MCNC: 13.2 G/DL (ref 11.5–16)
LYMPHOCYTES # BLD: 2.2 K/UL (ref 0.8–3.5)
LYMPHOCYTES NFR BLD: 23 % (ref 12–49)
MCH RBC QN AUTO: 26.2 PG (ref 26–34)
MCHC RBC AUTO-ENTMCNC: 30.8 G/DL (ref 30–36.5)
MCV RBC AUTO: 84.9 FL (ref 80–99)
MONOCYTES # BLD: 0.5 K/UL (ref 0–1)
MONOCYTES NFR BLD: 6 % (ref 5–13)
NEUTS SEG # BLD: 6.6 K/UL (ref 1.8–8)
NEUTS SEG NFR BLD: 70 % (ref 32–75)
PLATELET # BLD AUTO: 280 K/UL (ref 150–400)
PMV BLD AUTO: 10.1 FL (ref 8.9–12.9)
POTASSIUM SERPL-SCNC: 3.1 MMOL/L (ref 3.5–5.1)
RBC # BLD AUTO: 5.04 M/UL (ref 3.8–5.2)
SAMPLES BEING HELD,HOLD: NORMAL
SODIUM SERPL-SCNC: 138 MMOL/L (ref 136–145)
TROPONIN I BLD-MCNC: <0.04 NG/ML (ref 0–0.08)
WBC # BLD AUTO: 9.4 K/UL (ref 3.6–11)

## 2019-11-17 PROCEDURE — 93005 ELECTROCARDIOGRAM TRACING: CPT

## 2019-11-17 PROCEDURE — 36415 COLL VENOUS BLD VENIPUNCTURE: CPT

## 2019-11-17 PROCEDURE — 85025 COMPLETE CBC W/AUTO DIFF WBC: CPT

## 2019-11-17 PROCEDURE — 84484 ASSAY OF TROPONIN QUANT: CPT

## 2019-11-17 PROCEDURE — 74011000250 HC RX REV CODE- 250: Performed by: NURSE PRACTITIONER

## 2019-11-17 PROCEDURE — 99285 EMERGENCY DEPT VISIT HI MDM: CPT

## 2019-11-17 PROCEDURE — 85379 FIBRIN DEGRADATION QUANT: CPT

## 2019-11-17 PROCEDURE — 96374 THER/PROPH/DIAG INJ IV PUSH: CPT

## 2019-11-17 PROCEDURE — 74011250636 HC RX REV CODE- 250/636: Performed by: NURSE PRACTITIONER

## 2019-11-17 PROCEDURE — 74011250637 HC RX REV CODE- 250/637: Performed by: NURSE PRACTITIONER

## 2019-11-17 PROCEDURE — 80048 BASIC METABOLIC PNL TOTAL CA: CPT

## 2019-11-17 RX ORDER — METFORMIN HYDROCHLORIDE 500 MG/1
500 TABLET ORAL DAILY
COMMUNITY
End: 2020-10-20 | Stop reason: ALTCHOICE

## 2019-11-17 RX ORDER — ATORVASTATIN CALCIUM 10 MG/1
10 TABLET, FILM COATED ORAL DAILY
COMMUNITY
End: 2019-12-16

## 2019-11-17 RX ORDER — HYDROXYZINE 50 MG/1
50 TABLET, FILM COATED ORAL
COMMUNITY
End: 2020-05-29

## 2019-11-17 RX ORDER — POTASSIUM CHLORIDE 750 MG/1
40 TABLET, FILM COATED, EXTENDED RELEASE ORAL
Status: COMPLETED | OUTPATIENT
Start: 2019-11-17 | End: 2019-11-17

## 2019-11-17 RX ORDER — IBUPROFEN 600 MG/1
600 TABLET ORAL
Qty: 30 TAB | Refills: 0 | Status: SHIPPED | OUTPATIENT
Start: 2019-11-17 | End: 2019-12-16

## 2019-11-17 RX ORDER — FAMOTIDINE 20 MG/1
20 TABLET, FILM COATED ORAL 2 TIMES DAILY
Qty: 60 TAB | Refills: 0 | Status: SHIPPED | OUTPATIENT
Start: 2019-11-17 | End: 2019-12-16

## 2019-11-17 RX ORDER — METHOCARBAMOL 500 MG/1
750 TABLET, FILM COATED ORAL
Status: COMPLETED | OUTPATIENT
Start: 2019-11-17 | End: 2019-11-17

## 2019-11-17 RX ORDER — LIDOCAINE 50 MG/G
PATCH TOPICAL
Qty: 30 EACH | Refills: 0 | Status: SHIPPED | OUTPATIENT
Start: 2019-11-17 | End: 2019-12-16

## 2019-11-17 RX ORDER — FAMOTIDINE 10 MG/ML
20 INJECTION INTRAVENOUS
Status: COMPLETED | OUTPATIENT
Start: 2019-11-17 | End: 2019-11-17

## 2019-11-17 RX ORDER — ASPIRIN 81 MG/1
81 TABLET ORAL DAILY
COMMUNITY
End: 2020-10-20 | Stop reason: ALTCHOICE

## 2019-11-17 RX ORDER — METHOCARBAMOL 750 MG/1
750 TABLET, FILM COATED ORAL
Qty: 30 TAB | Refills: 0 | Status: SHIPPED | OUTPATIENT
Start: 2019-11-17 | End: 2020-05-29

## 2019-11-17 RX ADMIN — LIDOCAINE HYDROCHLORIDE 40 ML: 20 SOLUTION ORAL; TOPICAL at 18:52

## 2019-11-17 RX ADMIN — METHOCARBAMOL TABLETS 750 MG: 500 TABLET, COATED ORAL at 19:57

## 2019-11-17 RX ADMIN — POTASSIUM CHLORIDE 40 MEQ: 750 TABLET, FILM COATED, EXTENDED RELEASE ORAL at 19:48

## 2019-11-17 RX ADMIN — FAMOTIDINE 20 MG: 10 INJECTION, SOLUTION INTRAVENOUS at 18:52

## 2019-11-17 NOTE — ED TRIAGE NOTES
Pt presents to ED with c/o chest and back pain for two weeks. Pt was seen in [facility] ED for this complaint. Pt states her symptoms are getting worse. Pain is worse with movement, deep inspiration. Pt states she has had some dyspnea and dizziness.

## 2019-11-17 NOTE — ED PROVIDER NOTES
Patient is a 44-year-old female with past medical history significant for morbid obesity, PE, diabetes, hypertension, panic disorder who was ambulatory to the ED today with complaints of worsening chest and back pain over the last 2 weeks. Seen in [place] when the pain initially started. She had a negative PE CT and lab work while at that hospital.  She saw her primary care provider last week. They believed it was a panic disorder and gave her hydroxyzine. Patient states she notes the pain seems to be worsening. It is worse with deep inspiration and has some nausea. Patient states her nausea is worse after eating. She has episodes of room spinning dizziness that lasts only seconds. She describes her pain as a burning sensation under the left scapula and under the left breast.  The pain does not radiate or get worse when she is walking. She states she feels better when she is up moving around. She notes a 60 pound weight loss in the last 6 months after being diagnosed with diabetes and changing her diet. However, she notes that she has been more constipated over the last 6 months. Patient denies fever, chills, vomiting. She is speaking in full sentences. Patient is no further complaints at this time. Primary care Alicia Morris MD    [facility] score 1 (H/O PE)    The history is provided by the patient. No  was used.       Past Medical History:   Diagnosis Date    Diabetes ([address]     Hypertension     Ill-defined condition     PE    Panic attack     Pneumonia     Psychiatric disorder     Pulmonary embolism (HCC)     Urticaria      Past Surgical History:   Procedure Laterality Date    HX DILATION AND CURETTAGE       Family History:   Problem Relation Age of Onset    Stroke Other      Social History     Socioeconomic History    Marital status: SINGLE     Spouse name: Not on file    Number of children: Not on file    Years of education: Not on file    Highest education level: Not on file   Occupational History    Not on file   Social Needs    Financial resource strain: Not on file    Food insecurity:     Worry: Not on file     Inability: Not on file    Transportation needs:     Medical: Not on file     Non-medical: Not on file   Tobacco Use    Smoking status: Former Smoker     Packs/day: 0.25    Smokeless tobacco: Never Used   Substance and Sexual Activity    Alcohol use: No     Comment: rarely    Drug use: No    Sexual activity: Not on file   Lifestyle    Physical activity:     Days per week: Not on file     Minutes per session: Not on file    Stress: Not on file   Relationships    Social connections:     Talks on phone: Not on file     Gets together: Not on file     Attends Latter day service: Not on file     Active member of club or organization: Not on file     Attends meetings of clubs or organizations: Not on file     Relationship status: Not on file    Intimate partner violence:     Fear of current or ex partner: Not on file     Emotionally abused: Not on file     Physically abused: Not on file     Forced sexual activity: Not on file   Other Topics Concern    Not on file   Social History Narrative    Not on file     ALLERGIES: Hydrocodone    Review of Systems   Constitutional: Negative for chills and fever. Respiratory: Negative for shortness of breath. Cardiovascular: Positive for chest pain. Gastrointestinal: Positive for nausea. Negative for vomiting. Musculoskeletal: Positive for back pain. All other systems reviewed and are negative. Vitals:    11/17/19 1815   BP: 137/75   Pulse: 61   Resp: 16   Temp: 98.1 °F (36.7 °C)   SpO2: 95%   Weight: 133.4 kg (294 lb)   Height: 5' 3\" (1.6 m)          Physical Exam   Constitutional: She is oriented to person, place, and time. Vital signs are normal. She appears well-developed and well-nourished. She is active and cooperative. Non-toxic appearance. She does not have a sickly appearance.  She does not appear ill. No distress. 70-year-old female in no apparent distress. HENT:   Head: Normocephalic and atraumatic. Neck: Normal range of motion. Neck supple. Cardiovascular: Regular rhythm, normal heart sounds and intact distal pulses. Bradycardia present. Pulses:       Radial pulses are 2+ on the right side, and 2+ on the left side. Tenderness to palpation across the chest and sternal area. Pulmonary/Chest: Effort normal and breath sounds normal. No respiratory distress. She has no wheezes. She has no rales. She exhibits no tenderness. Abdominal: Soft. Normal appearance and bowel sounds are normal. There is tenderness in the left lower quadrant. There is no guarding. Obese abdomen   Musculoskeletal: Normal range of motion. Left shoulder: She exhibits tenderness and spasm. She exhibits normal range of motion, no bony tenderness, no swelling, no effusion, no crepitus, no deformity, no laceration, no pain, normal pulse and normal strength. Arms:  Trigger point around the left scapula. Reproduces pain. Neurological: She is alert and oriented to person, place, and time. Skin: Skin is warm and dry. No erythema. Psychiatric: She has a normal mood and affect. Her behavior is normal. Judgment and thought content normal.   Nursing note and vitals reviewed. MDM       Procedures     ED EKG interpretation: 6:10 PM  Rhythm: sinus bradycardia and minimal voltage criteria for LVH, may be normal variant; and regular . Rate (approx.): 58; Axis: normal; P wave: normal; QRS interval: normal ; ST/T wave: non-specific changes; Other findings: abnormal ekg. NO STEMI. This EKG was interpreted by Markos Villa DO,ED Provider.       Assessment & Plan:     Orders Placed This Encounter    Hold Sample    CBC WITH AUTOMATED DIFF    METABOLIC PANEL, BASIC    D DIMER    POC TROPONIN-I    EKG 12 LEAD INITIAL    EKG, 12 LEAD, INITIAL    metFORMIN (GLUCOPHAGE) 500 mg tablet    atorvastatin (LIPITOR) 10 mg tablet    hydrOXYzine HCl (ATARAX) 50 mg tablet    aspirin delayed-release 81 mg tablet    famotidine (PF) (PEPCID) injection 20 mg    mylanta/viscous lidocaine (GI COCKTAIL)       Discussed with Vivi Small DO,ED Provider    Ray Vaz NP  11/17/19  6:13 PM      Chest pain somewhat better after GI cocktail and Pepcid. May have symptoms of GERD in addition to trigger point on the left scapular area. Ice and heat to the back, ibuprofen, Lidoderm patch. For GERD symptoms start Pepcid twice daily for the next 30 days. If symptoms improve likely due to GERD. Follow-up with GI. Discussed return precautions. 8:10 PM  Patient re-evaluated. All questions answered. Patient appropriate for discharge. Given return precautions and follow up instructions. LABORATORY TESTS:  Labs Reviewed   CBC WITH AUTOMATED DIFF - Abnormal; Notable for the following components:       Result Value    RDW 15.4 (*)     All other components within normal limits   METABOLIC PANEL, BASIC - Abnormal; Notable for the following components:    Potassium 3.1 (*)     All other components within normal limits   SAMPLES BEING HELD   D DIMER   POC TROPONIN-I   POC TROPONIN-I       IMAGING RESULTS:  No orders to display       MEDICATIONS GIVEN:  Medications   famotidine (PF) (PEPCID) injection 20 mg (20 mg IntraVENous Given 11/17/19 1852)   mylanta/viscous lidocaine (GI COCKTAIL) (40 mL Oral Given 11/17/19 1852)   potassium chloride SR (KLOR-CON 10) tablet 40 mEq (40 mEq Oral Given 11/17/19 1948)   methocarbamol (ROBAXIN) tablet 750 mg (750 mg Oral Given 11/17/19 1957)       IMPRESSION:  1. Musculoskeletal chest pain    2. Trigger point of left shoulder region    3. Gastroesophageal reflux disease, esophagitis presence not specified    4. Hypokalemia        PLAN:  1.    Current Discharge Medication List      START taking these medications    Details   methocarbamol (ROBAXIN) 750 mg tablet Take 1 Tab by mouth four (4) times daily as needed (Muscle spasm). Indications: muscle spasm  Qty: 30 Tab, Refills: 0      famotidine (PEPCID) 20 mg tablet Take 1 Tab by mouth two (2) times a day for 30 days. Qty: 60 Tab, Refills: 0      ibuprofen (MOTRIN) 600 mg tablet Take 1 Tab by mouth every six (6) hours as needed for Pain. Qty: 30 Tab, Refills: 0      lidocaine (LIDODERM) 5 % Apply patch to the affected area for 12 hours a day and remove for 12 hours a day. Qty: 30 Each, Refills: 0         STOP taking these medications       ibuprofen 200 mg cap Comments:   Reason for Stoppin.   Follow-up Information     Follow up With Specialties Details Why [facility],[address]  Schedule an appointment as soon as possible for a visit Primary care referral [address]  [phone]    [address] [facility] Gastroenterolgy  Schedule an appointment as soon as possible for a visit For heartburn symptoms [address] [facility],[address] [id number] [phone]    [facility] DEPT Emergency Medicine  As needed, If symptoms worsen [address]  [phone]        3. Return to ED for new or worsening symptoms       Vane Edward NP    Please note that this dictation was completed with [employer], the computer voice recognition software. Quite often unanticipated grammatical, syntax, homophones, and other interpretive errors are inadvertently transcribed by the computer software. Please disregard these errors. Please excuse any errors that have escaped final proofreading.

## 2019-11-18 LAB
ATRIAL RATE: 58 BPM
CALCULATED P AXIS, ECG09: 44 DEGREES
CALCULATED T AXIS, ECG11: 42 DEGREES
DIAGNOSIS, 93000: NORMAL
P-R INTERVAL, ECG05: 154 MS
Q-T INTERVAL, ECG07: 466 MS
QRS DURATION, ECG06: 100 MS
QTC CALCULATION (BEZET), ECG08: 457 MS
VENTRICULAR RATE, ECG03: 58 BPM

## 2019-11-18 NOTE — ED NOTES
The pt was discharged home with follow-up instructions and 4 prescriptions. Pt states that she feels better. Pt verbalizes understanding of the follow-up instructions.

## 2019-11-18 NOTE — DISCHARGE INSTRUCTIONS
Thank you for allowing us to care for you today. Please follow-up with your Primary Care provider in the next 2-3 days if your symptoms do not improve. Plan for home:     Lidoderm 5% patch: apply & leave in place for 12 hours. Then remove and leave off for 12 hours. If the 5% patch is not covered by your insurance try the 4% patch that is available Over-the-counter. It is made by [employer] or [employer]. [employer] and [employer] are the least expensive. [employer] was the most expensive. Robaxin up to 4 times daily as needed for muscle spasm. No driving while on this medication. Ibuprofen every 6-8 hours for 7-10 days. Use on a schedule for the  next 3 days at a minimum, even if not having pain. Ibuprofen is an anti-inflammatory medication called an NSAID. It will help to treat the source of your pain. It is best to use this medication on a schedule. You need to keep the blood levels of the medication up for effective treatment. Narcotic pain medications only mask the pain and do not treat the source of your pain. Gentle stretching of the upper back    You should use ice or heat for your injury and pain. You may use one or the other or alternate between the two. If you use ice: apply the ice pack in 20 minute intervals. 20 minutes on then 20 minutes off. Make sure to protect the skin to prevent frost bite. Never apply ice or a plastic bag with ice directly to the skin. If you use heat: Do NOT lay or sleep on a heating pad. You will burn your skin. Instead, use microwave style heat packs or Thermacare packs. These are safer than traditional heating pads. Monitor your skin to prevent burns. Pepcid twice daily for 30 days. See if this improves your nausea and pain after eating. You could have esophageal reflux or GERD. If the Pepcid improves your symptoms please follow-up with gastroenterology.     Since you have no primary care provider listed we will refer you to [address] on [facility]. They are open 7 days a week. They are a part of the [employer]. They will have access to x-rays and labs you had done here in the Emergency Room. Patient Education        Musculoskeletal Chest Pain: Care Instructions  Your Care Instructions    Chest pain is not always a sign that something is wrong with your heart or that you have another serious problem. The doctor thinks your chest pain is caused by strained muscles or ligaments, inflamed chest cartilage, or another problem in your chest, rather than by your heart. You may need more tests to find the cause of your chest pain. Follow-up care is a key part of your treatment and safety. Be sure to make and go to all appointments, and call your doctor if you are having problems. It's also a good idea to know your test results and keep a list of the medicines you take. How can you care for yourself at home? · Take pain medicines exactly as directed. ? If the doctor gave you a prescription medicine for pain, take it as prescribed. ? If you are not taking a prescription pain medicine, ask your doctor if you can take an over-the-counter medicine. · Rest and protect the sore area. · Stop, change, or take a break from any activity that may be causing your pain or soreness. · Put ice or a cold pack on the sore area for 10 to 20 minutes at a time. Try to do this every 1 to 2 hours for the next 3 days (when you are awake) or until the swelling goes down. Put a thin cloth between the ice and your skin. · After 2 or 3 days, apply a heating pad set on low or a warm cloth to the area that hurts. Some doctors suggest that you go back and forth between hot and cold. · Do not wrap or tape your ribs for support. This may cause you to take smaller breaths, which could increase your risk of lung problems. · Mentholated creams such as Bengay or Icy Hot may soothe sore muscles. Follow the instructions on the package.   · Follow your doctor's instructions for exercising. · Gentle stretching and massage may help you get better faster. Stretch slowly to the point just before pain begins, and hold the stretch for at least 15 to 30 seconds. Do this 3 or 4 times a day. Stretch just after you have applied heat. · As your pain gets better, slowly return to your normal activities. Any increased pain may be a sign that you need to rest a while longer. When should you call for help? Call 911 anytime you think you may need emergency care. For example, call if:    · You have chest pain or pressure. This may occur with:  ? Sweating. ? Shortness of breath. ? Nausea or vomiting. ? Pain that spreads from the chest to the neck, jaw, or one or both shoulders or arms. ? Dizziness or lightheadedness. ? A fast or uneven pulse. After calling 911, chew 1 adult-strength aspirin. Wait for an ambulance. Do not try to drive yourself.     · You have sudden chest pain and shortness of breath, or you cough up blood.    Call your doctor now or seek immediate medical care if:    · You have any trouble breathing.     · Your chest pain gets worse.     · Your chest pain occurs consistently with exercise and is relieved by rest.    Watch closely for changes in your health, and be sure to contact your doctor if:    · Your chest pain does not get better after 1 week. Where can you learn more? Go to http://randi-ned.info/. Enter V293 in the search box to learn more about \"Musculoskeletal Chest Pain: Care Instructions. \"  Current as of: June 26, 2019  Content Version: 12.2  © 4823-0449 [employer]. Care instructions adapted under license by [employer] (which disclaims liability or warranty for this information). If you have questions about a medical condition or this instruction, always ask your healthcare professional. [place] [address] any warranty or liability for your use of this information.        Patient Education        Gastroesophageal Reflux Disease (GERD): Care Instructions  Your Care Instructions    Gastroesophageal reflux disease (GERD) is the backward flow of stomach acid into the esophagus. The esophagus is the tube that leads from your throat to your stomach. A one-way valve prevents the stomach acid from moving up into this tube. When you have GERD, this valve does not close tightly enough. If you have mild GERD symptoms including heartburn, you may be able to control the problem with antacids or over-the-counter medicine. Changing your diet, losing weight, and making other lifestyle changes can also help reduce symptoms. Follow-up care is a key part of your treatment and safety. Be sure to make and go to all appointments, and call your doctor if you are having problems. It's also a good idea to know your test results and keep a list of the medicines you take. How can you care for yourself at home? · Take your medicines exactly as prescribed. Call your doctor if you think you are having a problem with your medicine. · Your doctor may recommend over-the-counter medicine. For mild or occasional indigestion, antacids, such as Tums, Gaviscon, Mylanta, or Maalox, may help. Your doctor also may recommend over-the-counter acid reducers, such as Pepcid AC, Tagamet HB, Zantac 75, or Prilosec. Read and follow all instructions on the label. If you use these medicines often, talk with your doctor. · Change your eating habits. ? It's best to eat several small meals instead of two or three large meals. ? After you eat, wait 2 to 3 hours before you lie down. ? Chocolate, mint, and alcohol can make GERD worse. ? Spicy foods, foods that have a lot of acid (like tomatoes and oranges), and coffee can make GERD symptoms worse in some people. If your symptoms are worse after you eat a certain food, you may want to stop eating that food to see if your symptoms get better.   · Do not smoke or chew tobacco. Smoking can make GERD worse. If you need help quitting, talk to your doctor about stop-smoking programs and medicines. These can increase your chances of quitting for good. · If you have GERD symptoms at night, raise the head of your bed 6 to 8 inches by putting the frame on blocks or placing a foam wedge under the head of your mattress. (Adding extra pillows does not work.)  · Do not wear tight clothing around your middle. · Lose weight if you need to. Losing just 5 to 10 pounds can help. When should you call for help? Call your doctor now or seek immediate medical care if:    · You have new or different belly pain.     · Your stools are black and tarlike or have streaks of blood.    Watch closely for changes in your health, and be sure to contact your doctor if:    · Your symptoms have not improved after 2 days.     · Food seems to catch in your throat or chest.   Where can you learn more? Go to http://randi-ned.info/. Enter N296 in the search box to learn more about \"Gastroesophageal Reflux Disease (GERD): Care Instructions. \"  Current as of: November 7, 2018  Content Version: 12.2  © 5450-3099 [employer]. Care instructions adapted under license by [employer] (which disclaims liability or warranty for this information). If you have questions about a medical condition or this instruction, always ask your healthcare professional. [address] any warranty or liability for your use of this information.

## 2019-12-16 ENCOUNTER — OFFICE VISIT (OUTPATIENT)
Dept: FAMILY MEDICINE CLINIC | Age: 39
End: 2019-12-16

## 2019-12-16 VITALS
SYSTOLIC BLOOD PRESSURE: 118 MMHG | BODY MASS INDEX: 50.68 KG/M2 | DIASTOLIC BLOOD PRESSURE: 60 MMHG | TEMPERATURE: 98.2 F | HEART RATE: 82 BPM | RESPIRATION RATE: 18 BRPM | OXYGEN SATURATION: 98 % | HEIGHT: 63 IN | WEIGHT: 286 LBS

## 2019-12-16 DIAGNOSIS — R07.9 LEFT-SIDED CHEST PAIN: ICD-10-CM

## 2019-12-16 DIAGNOSIS — H10.32 ACUTE CONJUNCTIVITIS OF LEFT EYE, UNSPECIFIED ACUTE CONJUNCTIVITIS TYPE: Primary | ICD-10-CM

## 2019-12-16 RX ORDER — ATORVASTATIN CALCIUM 40 MG/1
1 TABLET, FILM COATED ORAL DAILY
Refills: 3 | COMMUNITY
Start: 2019-09-17 | End: 2021-01-11 | Stop reason: SDUPTHER

## 2019-12-16 RX ORDER — POLYMYXIN B SULFATE AND TRIMETHOPRIM 1; 10000 MG/ML; [USP'U]/ML
1 SOLUTION OPHTHALMIC EVERY 6 HOURS
Qty: 1 BOTTLE | Refills: 0 | Status: SHIPPED | OUTPATIENT
Start: 2019-12-16 | End: 2019-12-23

## 2019-12-16 RX ORDER — LISINOPRIL AND HYDROCHLOROTHIAZIDE 20; 25 MG/1; MG/1
1 TABLET ORAL DAILY
Refills: 3 | COMMUNITY
Start: 2019-11-04 | End: 2020-10-20 | Stop reason: ALTCHOICE

## 2019-12-16 NOTE — PROGRESS NOTES
Identified pt with two pt identifiers(name and ). Chief Complaint   Patient presents with   [address]     PCP formerly Dr. Leti Suárez from [facility] Chest Pain    [place]     Left eye. began about 3 or 4 days ago. Health Maintenance Due   Topic    DTaP/Tdap/Td series (1 - Tdap)    PAP AKA CERVICAL CYTOLOGY     Influenza Age 5 to Adult        Wt Readings from Last 3 Encounters:   19 286 lb (129.7 kg)   19 294 lb (133.4 kg)   18 300 lb (136.1 kg)     Temp Readings from Last 3 Encounters:   19 98.2 °F (36.8 °C) (Oral)   19 98.1 °F (36.7 °C)   18 98.2 °F (36.8 °C)     BP Readings from Last 3 Encounters:   19 118/60   19 106/77   18 141/79     Pulse Readings from Last 3 Encounters:   19 82   19 66   18 93         Learning Assessment:  :     Learning Assessment 2019   PRIMARY LEARNER Patient   PRIMARY LANGUAGE ENGLISH   LEARNER PREFERENCE PRIMARY DEMONSTRATION   ANSWERED BY self   RELATIONSHIP SELF       Depression Screening:  :     3 most recent PHQ Screens 2019   Little interest or pleasure in doing things Not at all   Feeling down, depressed, irritable, or hopeless Not at all   Total Score PHQ 2 0       Fall Risk Assessment:  :     No flowsheet data found. Abuse Screening:  :     Abuse Screening Questionnaire 2019   Do you ever feel afraid of your partner? N   Are you in a relationship with someone who physically or mentally threatens you? N   Is it safe for you to go home? Y       Coordination of Care Questionnaire:  :     1) Have you been to an emergency room, urgent care clinic since your last visit? no   Hospitalized since your last visit? no             2) Have you seen or consulted any other health care providers outside of [address] [place] [address] since your last visit? no  (Include any pap smears or colon screenings in this section.)    3) Do you have an Advance Directive on file?  no  Are you interested in receiving information about Advance Directives? no    Reviewed record in preparation for visit and have obtained necessary documentation. Medication reconciliation up to date and corrected with patient at this time.

## 2019-12-16 NOTE — PATIENT INSTRUCTIONS
Chest Pain: Care Instructions Your Care Instructions There are many things that can cause chest pain. Some are not serious and will get better on their own in a few days. But some kinds of chest pain need more testing and treatment. Your doctor may have recommended a follow-up visit in the next 8 to 12 hours. If you are not getting better, you may need more tests or treatment. Even though your doctor has released you, you still need to watch for any problems. The doctor carefully checked you, but sometimes problems can develop later. If you have new symptoms or if your symptoms do not get better, get medical care right away. If you have worse or different chest pain or pressure that lasts more than 5 minutes or you passed out (lost consciousness), call 911 or seek other emergency help right away. A medical visit is only one step in your treatment. Even if you feel better, you still need to do what your doctor recommends, such as going to all suggested follow-up appointments and taking medicines exactly as directed. This will help you recover and help prevent future problems. How can you care for yourself at home? · Rest until you feel better. · Take your medicine exactly as prescribed. Call your doctor if you think you are having a problem with your medicine. · Do not drive after taking a prescription pain medicine. When should you call for help? Call 911 if: 
  · You passed out (lost consciousness).  
  · You have severe difficulty breathing.  
  · You have symptoms of a heart attack. These may include: 
? Chest pain or pressure, or a strange feeling in your chest. 
? Sweating. ? Shortness of breath. ? Nausea or vomiting. ? Pain, pressure, or a strange feeling in your back, neck, jaw, or upper belly or in one or both shoulders or arms. ? Lightheadedness or sudden weakness. ? A fast or irregular heartbeat.  
After you call 911, the  may tell you to chew 1 adult-strength or 2 to 4 low-dose aspirin. Wait for an ambulance. Do not try to drive yourself.  
 Call your doctor today if: 
  · You have any trouble breathing.  
  · Your chest pain gets worse.  
  · You are dizzy or lightheaded, or you feel like you may faint.  
  · You are not getting better as expected.  
  · You are having new or different chest pain. Where can you learn more? Go to http://randi-ned.info/. Enter A120 in the search box to learn more about \"Chest Pain: Care Instructions. \" Current as of: June 26, 2019 Content Version: 12.2 © 1246-3544 [employer]. Care instructions adapted under license by [employer] (which disclaims liability or warranty for this information). If you have questions about a medical condition or this instruction, always ask your healthcare professional. [address] any warranty or liability for your use of this information. Potassium-Rich Diet: Care Instructions Your Care Instructions Potassium is a mineral. It helps keep the right mix of fluids in your body. It also helps your nerves and muscles work as they should. You'll find it in milk and meats. It's also in all fresh foods, including fruits and vegetables. Most adults need about 5 grams of potassium a day. The foods you eat should supply all that you need. Some health conditions can cause a loss of potassium. For example, kidney problems and stomach problems with vomiting and diarrhea can cause you to lose this mineral. Some medicines, such as water pills (diuretics), can cause low potassium. If you can't get enough potassium from what you eat, your doctor may advise you to take supplements. Follow-up care is a key part of your treatment and safety. Be sure to make and go to all appointments, and call your doctor if you are having problems. It's also a good idea to know your test results and keep a list of the medicines you take. How can you care for yourself at home? · Plan your diet around foods that are rich in potassium. Fresh, unprocessed whole foods have the most. These foods include: ? Milk and other dairy products. ? Vegetables, especially broccoli, cooked dry beans, tomatoes, potatoes, artichokes, winter squash, and spinach. ? Fruits, especially citrus fruits, bananas, and apricots. Dried apricots contain more potassium than fresh apricots. ? Meat, poultry, and fish. · Ask your doctor about using a salt substitute or \"light\" salt. These often contain potassium. Where can you learn more? Go to http://randi-ned.info/. Enter H315 in the search box to learn more about \"Potassium-Rich Diet: Care Instructions. \" Current as of: November 7, 2018 Content Version: 12.2 © 4434-4053 [employer]. Care instructions adapted under license by [employer] (which disclaims liability or warranty for this information). If you have questions about a medical condition or this instruction, always ask your healthcare professional. [address] any warranty or liability for your use of this information.

## 2019-12-16 NOTE — PROGRESS NOTES
Subjective:      Megan Monday is a 46 y.o. female new patient her with complaint of left eye redness and chest apin. Left eye redness: Onset 3-4 days ago. Associated with mild itching and eye pain. Has been sensitivity to light. She has been treated with cold compresses. No fever, no eye trauma. Does not wear contacts. Chest and back pain: onset was ~1 month ago. - In the back pain is tingling and burning in nature for which she has been evaluated for and thought to be secondary to muscle spasm from her large breasts. - Chest pain: located from the left armpit into the mid-sternum, pain is burning and feels like someone is sitting on the chest. Has been associated with nausea, dizziness. Denies dyspnea, pleuritic pain, cough, skin rash   - Has been to [address] [place] [address] [facility] [place] with last visit about 5 days ago. EKG and CTA reported to be normal.   - Risk factors: obesity, diabetes, hypertension      Current Outpatient Medications   Medication Sig Dispense Refill    atorvastatin (LIPITOR) 40 mg tablet Take 1 Tab by mouth daily. 3    lisinopril-hydroCHLOROthiazide (PRINZIDE, ZESTORETIC) 20-25 mg per tablet Take 1 Tab by mouth daily. 3    metFORMIN (GLUCOPHAGE) 500 mg tablet Take 500 mg by mouth daily.  hydrOXYzine HCl (ATARAX) 50 mg tablet Take 50 mg by mouth three (3) times daily as needed for Itching.  aspirin delayed-release 81 mg tablet Take 81 mg by mouth daily.  methocarbamol (ROBAXIN) 750 mg tablet Take 1 Tab by mouth four (4) times daily as needed (Muscle spasm). Indications: muscle spasm 30 Tab 0    albuterol (PROVENTIL HFA, VENTOLIN HFA, PROAIR HFA) 90 mcg/actuation inhaler Take 2 Puffs by inhalation every four (4) hours as needed for Wheezing. 1 Inhaler 0       Allergies   Allergen Reactions    Hydrocodone Hives       Past medical history - reviewed.    Past Medical History:   Diagnosis Date    Diabetes ([address]     Hypertension     Ill-defined condition     PE    Panic attack     Pneumonia     Psychiatric disorder     Pulmonary embolism (HCC)     Urticaria        Social history - reviewed. Social History     Tobacco Use    Smoking status: Former Smoker     Packs/day: 0.25    Smokeless tobacco: Never Used   Substance Use Topics    Alcohol use: No     Comment: rarely        Family history - reviewed. Family History   Problem Relation Age of Onset    Stroke Other     Hypertension Mother     No Known Problems Father        Review of Systems  Pertinent items are noted in HPI. Objective:     Visit Vitals  /60 (BP 1 Location: Right arm, BP Patient Position: Sitting) Comment: Manual   Pulse 82   Temp 98.2 °F (36.8 °C) (Oral) Comment: .   Resp 18   Ht 5' 3\" (1.6 m)   Wt 286 lb (129.7 kg)   SpO2 98%   BMI 50.66 kg/m²      General appearance - alert, well appearing, and in no distress  Eyes - pupils equal and reactive, extraocular eye movements intact, conjunctivitis noted left, left eye erythematous with mucoid discharge  Oropharyngx - mucous membranes moist, pharynx normal without lesions  Neck - supple, no significant adenopathy  Chest - clear to auscultation, no wheezes, rales or rhonchi, symmetric air entry, no tachypnea, retractions or cyanosis, chest wall tenderness noted left anterior and lateral chest walls   Heart - normal rate, regular rhythm, normal S1, S2, no murmurs, rubs, clicks or gallops  Neurologic - alert, oriented, normal speech, no focal findings or movement disorder noted  Mental Status - alert, oriented to person, place, and time, normal mood, behavior, speech, dress, motor activity, and thought processes    Assessment/Plan:   Preston Alvarado is a 46 y.o. female seen for:     1. Acute conjunctivitis of left eye, unspecified acute conjunctivitis type  - trimethoprim-polymyxin b (POLYTRIM) ophthalmic solution; Administer 1 Drop to left eye every six (6) hours for 7 days. Dispense: 1 Bottle;  Refill: 0  - cool compresses to eye  - Ophthalmology evaluation if no improvement noted with above treatment     2. Left-sided chest pain: with recent ED visit with normal workup per report. Will request these records. Given her history, would recommend Cardiology evaluation and referral placed. ED precautions provided. - REFERRAL TO CARDIOLOGY      I have discussed the diagnosis with the patient and the intended plan as seen in the above orders. The patient has received an after-visit summary and questions were answered concerning future plans. I have discussed medication side effects and warnings with the patient as well. Patient verbalizes understanding of plan of care and denies further questions or concerns at this time. Informed patient to return to the office if symptoms worsen or if new symptoms arise. Follow-up and Dispositions    · Return if symptoms worsen or fail to improve.

## 2019-12-30 ENCOUNTER — OFFICE VISIT (OUTPATIENT)
Dept: FAMILY MEDICINE CLINIC | Age: 39
End: 2019-12-30

## 2019-12-30 VITALS
HEIGHT: 63 IN | SYSTOLIC BLOOD PRESSURE: 128 MMHG | WEIGHT: 287 LBS | BODY MASS INDEX: 50.85 KG/M2 | TEMPERATURE: 98.6 F | HEART RATE: 71 BPM | DIASTOLIC BLOOD PRESSURE: 78 MMHG | RESPIRATION RATE: 18 BRPM | OXYGEN SATURATION: 98 %

## 2019-12-30 DIAGNOSIS — R06.83 SNORING: ICD-10-CM

## 2019-12-30 DIAGNOSIS — Z91.89 AT RISK FOR SLEEP APNEA: Primary | ICD-10-CM

## 2019-12-30 DIAGNOSIS — R21 PAPULAR RASH: ICD-10-CM

## 2019-12-30 DIAGNOSIS — E11.9 CONTROLLED TYPE 2 DIABETES MELLITUS WITHOUT COMPLICATION, WITHOUT LONG-TERM CURRENT USE OF INSULIN (HCC): ICD-10-CM

## 2019-12-30 RX ORDER — TRIAMCINOLONE ACETONIDE 1 MG/G
CREAM TOPICAL
Qty: 30 G | Refills: 0 | Status: SHIPPED | OUTPATIENT
Start: 2019-12-30 | End: 2020-01-29

## 2019-12-30 NOTE — PROGRESS NOTES
Subjective:      Davon Hunter is a 46 y.o. female here for follow up and referral for sleep study. Has been seen by Cardiology and plan is for stress testing and for Holter monitoring. Recommendation made for AUGUSTIN evaluation. She does snore at night, she awakens at night gasping for air, daytime fatigue. Pruritic rash noted on the forearms for about last week. No one at home with similar rash. No change in soaps, lotions, detergents used. Current Outpatient Medications   Medication Sig Dispense Refill    atorvastatin (LIPITOR) 40 mg tablet Take 1 Tab by mouth daily. 3    lisinopril-hydroCHLOROthiazide (PRINZIDE, ZESTORETIC) 20-25 mg per tablet Take 1 Tab by mouth daily. 3    metFORMIN (GLUCOPHAGE) 500 mg tablet Take 500 mg by mouth daily.  hydrOXYzine HCl (ATARAX) 50 mg tablet Take 50 mg by mouth three (3) times daily as needed for Itching.  aspirin delayed-release 81 mg tablet Take 81 mg by mouth daily.  methocarbamol (ROBAXIN) 750 mg tablet Take 1 Tab by mouth four (4) times daily as needed (Muscle spasm). Indications: muscle spasm 30 Tab 0    albuterol (PROVENTIL HFA, VENTOLIN HFA, PROAIR HFA) 90 mcg/actuation inhaler Take 2 Puffs by inhalation every four (4) hours as needed for Wheezing. 1 Inhaler 0       Allergies   Allergen Reactions    Hydrocodone Hives       Past Medical History:   Diagnosis Date    [facility] 04/2019    Diabetes type 2, controlled ([facility]     Hypertension     Ill-defined condition     PE    Panic attack     Pneumonia     Psychiatric disorder     Pulmonary embolism ([facility] 2015    treated with oral antiocoagulants for 12 months    Urticaria        Social History     Tobacco Use    Smoking status: Former Smoker     Packs/day: 0.25    Smokeless tobacco: Never Used   Substance Use Topics    Alcohol use: No     Comment: rarely        Review of Systems  Pertinent items are noted in HPI.      Objective:     Visit Vitals  /78 (BP 1 Location: Right arm, BP Patient Position: Sitting) Comment: Manual   Pulse 71   Temp 98.6 °F (37 °C) (Oral) Comment: .   Resp 18   Ht 5' 3\" (1.6 m)   Wt 287 lb (130.2 kg)   SpO2 98%   BMI 50.84 kg/m²      General appearance - alert, well appearing, and in no distress  Chest - clear to auscultation, no wheezes, rales or rhonchi, symmetric air entry, no tachypnea, retractions or cyanosis  Heart - normal rate, regular rhythm, normal S1, S2, no murmurs, rubs, clicks or gallops  Skin - papular rash noted on the bilateral forearms without drainage or drainage    Assessment/Plan:   Preston Alvarado is a 46 y.o. female seen for:     1. At risk for sleep apnea: will refer to Sleep Medicine for AUGUSTIN evaluation.   - SLEEP MEDICINE REFERRAL    2. Snoring  - SLEEP MEDICINE REFERRAL    3. Controlled type 2 diabetes mellitus without complication, without long-term current use of insulin ([facility]): check A1c, continue with current therapy. Recommend that she schedule eye examination.   - HEMOGLOBIN A1C WITH EAG; Future    4. Papular rash: on b/l forearms. Treat with topical corticosteroid therapy. - triamcinolone acetonide (KENALOG) 0.1 % topical cream; Apply  to affected area two (2) times daily as needed for Skin Irritation or Itching. use thin layer  Dispense: 30 g; Refill: 0    I have discussed the diagnosis with the patient and the intended plan as seen in the above orders. The patient has received an after-visit summary and questions were answered concerning future plans. I have discussed medication side effects and warnings with the patient as well. Patient verbalizes understanding of plan of care and denies further questions or concerns at this time. Informed patient to return to the office if symptoms worsen or if new symptoms arise.

## 2019-12-30 NOTE — PROGRESS NOTES
Identified pt with two pt identifiers(name and ). Chief Complaint   Patient presents with    Follow-up     cardiology would like to have sleep study done.  Labs     paitent would like to know if diabites is getting worse because of eye problems    Rash     began about a week ago on forearms        Health Maintenance Due   Topic    DTaP/Tdap/Td series (1 - Tdap)    PAP AKA CERVICAL CYTOLOGY     Influenza Age 5 to Adult        Wt Readings from Last 3 Encounters:   19 287 lb (130.2 kg)   19 286 lb (129.7 kg)   19 294 lb (133.4 kg)     Temp Readings from Last 3 Encounters:   19 98.6 °F (37 °C) (Oral)   19 98.2 °F (36.8 °C) (Oral)   19 98.1 °F (36.7 °C)     BP Readings from Last 3 Encounters:   19 128/78   19 118/60   19 106/77     Pulse Readings from Last 3 Encounters:   19 71   19 82   19 66         Learning Assessment:  :     Learning Assessment 2019   PRIMARY LEARNER Patient   PRIMARY LANGUAGE ENGLISH   LEARNER PREFERENCE PRIMARY DEMONSTRATION   ANSWERED BY self   RELATIONSHIP SELF       Depression Screening:  :     3 most recent PHQ Screens 2019   Little interest or pleasure in doing things Not at all   Feeling down, depressed, irritable, or hopeless Not at all   Total Score PHQ 2 0       Fall Risk Assessment:  :     No flowsheet data found. Abuse Screening:  :     Abuse Screening Questionnaire 2019   Do you ever feel afraid of your partner? N   Are you in a relationship with someone who physically or mentally threatens you? N   Is it safe for you to go home?  Y       Coordination of Care Questionnaire:  :     1) Have you been to an emergency room, urgent care clinic since your last visit? no   Hospitalized since your last visit? no             2) Have you seen or consulted any other health care providers outside of [address] [place] [address] since your last visit? no  (Include any pap smears or colon screenings in this section.)    3) Do you have an Advance Directive on file? no  Are you interested in receiving information about Advance Directives? no    Reviewed record in preparation for visit and have obtained necessary documentation. Medication reconciliation up to date and corrected with patient at this time.

## 2020-01-06 ENCOUNTER — TELEPHONE (OUTPATIENT)
Dept: FAMILY MEDICINE CLINIC | Age: 40
End: 2020-01-06

## 2020-01-06 DIAGNOSIS — E11.9 CONTROLLED TYPE 2 DIABETES MELLITUS WITHOUT COMPLICATION, WITHOUT LONG-TERM CURRENT USE OF INSULIN (HCC): Primary | ICD-10-CM

## 2020-01-07 NOTE — TELEPHONE ENCOUNTER
Requested Prescriptions     Signed Prescriptions Disp Refills    glucose blood VI test strips (PRODIGY NO CODING) strip 50 Strip 11     Sig: Check BG daily as directed.      Authorizing Provider: Vianey Balderrama

## 2020-01-16 ENCOUNTER — OFFICE VISIT (OUTPATIENT)
Dept: FAMILY MEDICINE CLINIC | Age: 40
End: 2020-01-16

## 2020-01-16 ENCOUNTER — HOSPITAL ENCOUNTER (OUTPATIENT)
Dept: LAB | Age: 40
Discharge: HOME | End: 2020-01-16

## 2020-01-16 VITALS
TEMPERATURE: 98.6 F | WEIGHT: 278 LBS | HEART RATE: 72 BPM | OXYGEN SATURATION: 97 % | DIASTOLIC BLOOD PRESSURE: 78 MMHG | SYSTOLIC BLOOD PRESSURE: 120 MMHG | RESPIRATION RATE: 18 BRPM | BODY MASS INDEX: 49.26 KG/M2 | HEIGHT: 63 IN

## 2020-01-16 DIAGNOSIS — E11.9 CONTROLLED TYPE 2 DIABETES MELLITUS WITHOUT COMPLICATION, WITHOUT LONG-TERM CURRENT USE OF INSULIN (HCC): Primary | ICD-10-CM

## 2020-01-16 DIAGNOSIS — H20.10 CHRONIC IRIDOCYCLITIS, UNSPECIFIED LATERALITY: ICD-10-CM

## 2020-01-16 DIAGNOSIS — E66.01 OBESITY, MORBID (HCC): ICD-10-CM

## 2020-01-16 DIAGNOSIS — E11.9 CONTROLLED TYPE 2 DIABETES MELLITUS WITHOUT COMPLICATION, WITHOUT LONG-TERM CURRENT USE OF INSULIN (HCC): ICD-10-CM

## 2020-01-16 LAB
ERYTHROCYTE [SEDIMENTATION RATE] IN BLOOD: 33 MM/HR (ref 0–20)
EST. AVERAGE GLUCOSE BLD GHB EST-MCNC: 114 MG/DL
HBA1C MFR BLD: 5.6 % (ref 4–5.6)

## 2020-01-16 NOTE — PROGRESS NOTES
Subjective:      Ricarda Jaffe is a 46 y.o. female here for follow up. 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 Due to this, recommendation made for follow labs for evaluation: VDR, Lyme titer, ESR, PPD, ESR, and HLA-B27. She has not had any recurrent symptoms at this time. Diabetes mellitus: here to have A1c done. Recent eye exam with no diabetic retinopathy or macular edema. She is working on her lifestyle. Current Outpatient Medications   Medication Sig Dispense Refill    glucose blood VI test strips (PRODIGY NO CODING) strip Check BG daily as directed. 50 Strip 11    triamcinolone acetonide (KENALOG) 0.1 % topical cream Apply  to affected area two (2) times daily as needed for Skin Irritation or Itching. use thin layer 30 g 0    atorvastatin (LIPITOR) 40 mg tablet Take 1 Tab by mouth daily. 3    lisinopril-hydroCHLOROthiazide (PRINZIDE, ZESTORETIC) 20-25 mg per tablet Take 1 Tab by mouth daily. 3    metFORMIN (GLUCOPHAGE) 500 mg tablet Take 500 mg by mouth daily.  hydrOXYzine HCl (ATARAX) 50 mg tablet Take 50 mg by mouth three (3) times daily as needed for Itching.  aspirin delayed-release 81 mg tablet Take 81 mg by mouth daily.  methocarbamol (ROBAXIN) 750 mg tablet Take 1 Tab by mouth four (4) times daily as needed (Muscle spasm). Indications: muscle spasm 30 Tab 0    albuterol (PROVENTIL HFA, VENTOLIN HFA, PROAIR HFA) 90 mcg/actuation inhaler Take 2 Puffs by inhalation every four (4) hours as needed for Wheezing.  1 Inhaler 0       Allergies   Allergen Reactions    Hydrocodone Hives       Past Medical History:   Diagnosis Date    [facility] ([facility] 04/2019    Diabetes type 2, controlled ([facility]     Hypertension     Ill-defined condition     PE    Panic attack     Pneumonia     Psychiatric disorder     Pulmonary embolism ([facility] 2015    treated with oral antiocoagulants for 12 months    Urticaria        Social History     Tobacco Use    Smoking status: Former Smoker     Packs/day: 0.25    Smokeless tobacco: Never Used   Substance Use Topics    Alcohol use: No     Comment: rarely        Review of Systems  Pertinent items are noted in HPI. Objective:     Visit Vitals  /78 (BP 1 Location: Right arm, BP Patient Position: Sitting) Comment: Manual   Pulse 72   Temp 98.6 °F (37 °C) (Oral) Comment: .   Resp 18   Ht 5' 3\" (1.6 m)   Wt 278 lb (126.1 kg)   SpO2 97%   BMI 49.25 kg/m²      General appearance - alert, well appearing, and in no distress  Eyes - pupils equal and reactive, extraocular eye movements intact, mild redness to the left eye  Chest - clear to auscultation, no wheezes, rales or rhonchi, symmetric air entry, no tachypnea, retractions or cyanosis  Heart - normal rate, regular rhythm, normal S1, S2, no murmurs, rubs, clicks or gallops    Assessment/Plan:   Dana Dennis is a 46 y.o. female seen for:     1. Controlled type 2 diabetes mellitus without complication, without long-term current use of insulin ([facility]): check A1c, continue with metformin and  lifestyle modifications including diet and exercise.   - HEMOGLOBIN A1C WITH EAG; Future    2. Chronic iridocyclitis, unspecified laterality: will check labs as below for further evaluation.   - SED RATE (ESR); Future  - HLA-B27; Future  - LYME AB TOTAL W/RFLX W BLOT; Future  - QUANTIFERON-TB PLUS(CLIENT INCUB.); Future  - RPR; Future    3. Obesity, morbid ([facility]): I have reviewed/discussed the above normal BMI with the patient. I have recommended the following interventions: dietary management education, guidance, and counseling and encourage exercise. I have discussed the diagnosis with the patient and the intended plan as seen in the above orders. The patient has received an after-visit summary and questions were answered concerning future plans.  I have discussed medication side effects and warnings with the patient as well. Patient verbalizes understanding of plan of care and denies further questions or concerns at this time. Informed patient to return to the office if symptoms worsen or if new symptoms arise.

## 2020-01-17 LAB — RPR SER QL: NONREACTIVE

## 2020-01-20 LAB — B BURGDOR IGG+IGM SER-ACNC: <0.91 ISR (ref 0–0.9)

## 2020-01-21 LAB
GAMMA INTERFERON BACKGROUND BLD IA-ACNC: 0.03 IU/ML
HLA-B27 QL NAA+PROBE: NEGATIVE
M TB IFN-G BLD-IMP: NEGATIVE
M TB IFN-G CD4+ BCKGRND COR BLD-ACNC: 0.05 IU/ML
MITOGEN IGNF BLD-ACNC: >10 IU/ML
QUANTIFERON TB2 AG: 0.06 IU/ML
SERVICE CMNT-IMP: NORMAL

## 2020-01-23 ENCOUNTER — TELEPHONE (OUTPATIENT)
Dept: FAMILY MEDICINE CLINIC | Age: 40
End: 2020-01-23

## 2020-01-28 DIAGNOSIS — R21 PAPULAR RASH: ICD-10-CM

## 2020-01-29 RX ORDER — TRIAMCINOLONE ACETONIDE 1 MG/G
CREAM TOPICAL
Qty: 30 G | Refills: 2 | Status: SHIPPED | OUTPATIENT
Start: 2020-01-29 | End: 2020-04-03

## 2020-01-31 NOTE — TELEPHONE ENCOUNTER
Patient is calling back about labs results again.   Please call her back today if possible at [phone]

## 2020-01-31 NOTE — TELEPHONE ENCOUNTER
Pt called and labs discussed. ESR mildly elevated, all other testing negative/normal. DM is controlled. Pt verbalizes understanding.

## 2020-03-06 ENCOUNTER — OFFICE VISIT (OUTPATIENT)
Dept: FAMILY MEDICINE CLINIC | Age: 40
End: 2020-03-06

## 2020-03-06 VITALS
BODY MASS INDEX: 48.02 KG/M2 | OXYGEN SATURATION: 98 % | RESPIRATION RATE: 18 BRPM | HEIGHT: 63 IN | WEIGHT: 271 LBS | HEART RATE: 70 BPM | DIASTOLIC BLOOD PRESSURE: 60 MMHG | TEMPERATURE: 98.6 F | SYSTOLIC BLOOD PRESSURE: 120 MMHG

## 2020-03-06 DIAGNOSIS — E66.01 OBESITY, MORBID (HCC): Primary | ICD-10-CM

## 2020-03-06 NOTE — PROGRESS NOTES
Subjective:      Bernardo Delacruz is a 46 y.o. female here to discuss bariatric surgery. Has appointment with [facility] 3/9/2020. Has been researching procedures and feels she would do best to gastric sleeve. PMHx notable for type 2 diabetes, hypertension, hyperlipidemia. Has upcoming AUGUSTIN evaluation.   - Highest adult weight: 357 lb (2019)  - Lowest adult weight: 271 lb  - Diet: has been watching her portions, calorie restriction  - Physical activity: states that she could do more exercise   - Feels as though she has hit a plateau with her weight    Wt Readings from Last 3 Encounters:   03/06/20 271 lb (122.9 kg)   01/16/20 278 lb (126.1 kg)   12/30/19 287 lb (130.2 kg)       Current Outpatient Medications   Medication Sig Dispense Refill    triamcinolone acetonide (KENALOG) 0.1 % topical cream Apply  to affected area two (2) times daily as needed for Skin Irritation or Itching. 30 g 2    glucose blood VI test strips (PRODIGY NO CODING) strip Check BG daily as directed. 50 Strip 11    atorvastatin (LIPITOR) 40 mg tablet Take 1 Tab by mouth daily. 3    lisinopril-hydroCHLOROthiazide (PRINZIDE, ZESTORETIC) 20-25 mg per tablet Take 1 Tab by mouth daily. 3    metFORMIN (GLUCOPHAGE) 500 mg tablet Take 500 mg by mouth daily.  hydrOXYzine HCl (ATARAX) 50 mg tablet Take 50 mg by mouth three (3) times daily as needed for Itching.  aspirin delayed-release 81 mg tablet Take 81 mg by mouth daily.  methocarbamol (ROBAXIN) 750 mg tablet Take 1 Tab by mouth four (4) times daily as needed (Muscle spasm). Indications: muscle spasm 30 Tab 0    albuterol (PROVENTIL HFA, VENTOLIN HFA, PROAIR HFA) 90 mcg/actuation inhaler Take 2 Puffs by inhalation every four (4) hours as needed for Wheezing.  1 Inhaler 0       Allergies   Allergen Reactions    Hydrocodone Hives       Past Medical History:   Diagnosis Date    [facility] 04/2019    Diabetes type 2, controlled ([facility]     Hypertension     Ill-defined condition PE    Panic attack     Pneumonia     Psychiatric disorder     Pulmonary embolism ([facility] 2015    treated with oral antiocoagulants for 12 months    Urticaria        Social History     Tobacco Use    Smoking status: Former Smoker     Packs/day: 0.25    Smokeless tobacco: Never Used   Substance Use Topics    Alcohol use: No     Comment: rarely         Objective:     Visit Vitals  /60 (BP 1 Location: Right arm, BP Patient Position: Sitting) Comment: Manual   Pulse 70   Temp 98.6 °F (37 °C) (Oral) Comment: .   Resp 18   Ht 5' 3\" (1.6 m)   Wt 271 lb (122.9 kg)   SpO2 98%   BMI 48.01 kg/m²      General appearance - alert, well appearing, and in no distress  Chest - clear to auscultation, no wheezes, rales or rhonchi, symmetric air entry, no tachypnea, retractions or cyanosis  Heart - normal rate, regular rhythm, normal S1, S2, no murmurs, rubs, clicks or gallops    Assessment/Plan:   Wade Erickson is a 46 y.o. female seen for:     1. Obesity, morbid ([facility]  - upcoming consultation at [address] [place] [address] 3/9/2020 and dicussed with her the available surgical options   - fully support her decision for weight loss surgery and feel that she is an ideal candidate   - letter of support provided    I have discussed the diagnosis with the patient and the intended plan as seen in the above orders. The patient has received an after-visit summary and questions were answered concerning future plans. I have discussed medication side effects and warnings with the patient as well. Patient verbalizes understanding of plan of care and denies further questions or concerns at this time. Informed patient to return to the office if symptoms worsen or if new symptoms arise. Total face to face time 15 minutes, with > 50% spent counseling or coordination of care regarding diagnosis, risk and benefits of various treatment plans and expected outcomes.

## 2020-03-06 NOTE — LETTER
3/6/2020 4:40 PM 
 
Ms. Ammy Miller [address] 
 
 
 
03/06/20 RE:  Ammy Miller [address] 
 1980 To Whom It May Concern: 
 
Ammy Miller is a patient with [address]. She is currently looking into  weight loss surgery. She has attempted multiple diets and various other regimens and has not found success with any of these. Currently, her weight is 271 pounds, height is 5 feet 3 inches, and she has a calculated body mass index of 48.01 kg/m2. She has significant co-morbidities which include type 2 diabetes mellitus, hypertension, hyperlipidemia. I fear that if she maintains her current weight or gains further weight that this could have an even more serious impact on her health. I feel that she is a good candidate for and support her desire to undergo weight loss surgery. Please contact me for any further questions or concerns. Our office phone number is listed above for your convenience. Sincerely, Neal Platt MD

## 2020-03-06 NOTE — PROGRESS NOTES
Identified pt with two pt identifiers(name and ). Chief Complaint   Patient presents with    Weight Management     would like to discuss gastric sleeve. has appointment on monday with [address] [place] [address] Maintenance Due   Topic    Pneumococcal 0-64 years (1 of 1 - PPSV23)    Foot Exam Q1     MICROALBUMIN Q1     DTaP/Tdap/Td series (1 - Tdap)    PAP AKA CERVICAL CYTOLOGY     Lipid Screen        Wt Readings from Last 3 Encounters:   20 271 lb (122.9 kg)   20 278 lb (126.1 kg)   19 287 lb (130.2 kg)     Temp Readings from Last 3 Encounters:   20 98.6 °F (37 °C) (Oral)   20 98.6 °F (37 °C) (Oral)   19 98.6 °F (37 °C) (Oral)     BP Readings from Last 3 Encounters:   20 120/60   20 120/78   19 128/78     Pulse Readings from Last 3 Encounters:   20 70   20 72   19 71         Learning Assessment:  :     Learning Assessment 2019   PRIMARY LEARNER Patient   PRIMARY LANGUAGE ENGLISH   LEARNER PREFERENCE PRIMARY DEMONSTRATION   ANSWERED BY self   RELATIONSHIP SELF       Depression Screening:  :     3 most recent PHQ Screens 2019   Little interest or pleasure in doing things Not at all   Feeling down, depressed, irritable, or hopeless Not at all   Total Score PHQ 2 0       Fall Risk Assessment:  :     No flowsheet data found. Abuse Screening:  :     Abuse Screening Questionnaire 2019   Do you ever feel afraid of your partner? N   Are you in a relationship with someone who physically or mentally threatens you? N   Is it safe for you to go home?  Y       Coordination of Care Questionnaire:  :     1) Have you been to an emergency room, urgent care clinic since your last visit? no   Hospitalized since your last visit? no             2) Have you seen or consulted any other health care providers outside of [address] [place] [address] since your last visit? no  (Include any pap smears or colon screenings in this section.)    3) Do you have an Advance Directive on file? no  Are you interested in receiving information about Advance Directives? no    Reviewed record in preparation for visit and have obtained necessary documentation. Medication reconciliation up to date and corrected with patient at this time.

## 2020-03-18 PROBLEM — G47.33 SEVERE OBSTRUCTIVE SLEEP APNEA: Status: ACTIVE | Noted: 2020-03-01

## 2020-04-03 ENCOUNTER — VIRTUAL VISIT (OUTPATIENT)
Dept: FAMILY MEDICINE CLINIC | Age: 40
End: 2020-04-03

## 2020-04-03 ENCOUNTER — NURSE TRIAGE (OUTPATIENT)
Dept: OTHER | Facility: CLINIC | Age: 40
End: 2020-04-03

## 2020-04-03 DIAGNOSIS — H65.00 NON-RECURRENT ACUTE SEROUS OTITIS MEDIA, UNSPECIFIED LATERALITY: Primary | ICD-10-CM

## 2020-04-03 DIAGNOSIS — R21 PAPULAR RASH: ICD-10-CM

## 2020-04-03 RX ORDER — ERGOCALCIFEROL 1.25 MG/1
50000 CAPSULE ORAL
COMMUNITY
End: 2020-04-08 | Stop reason: SDUPTHER

## 2020-04-03 RX ORDER — TRIAMCINOLONE ACETONIDE 1 MG/G
CREAM TOPICAL
Qty: 30 G | Refills: 2 | Status: SHIPPED | OUTPATIENT
Start: 2020-04-03 | End: 2021-05-14

## 2020-04-03 RX ORDER — CEFDINIR 300 MG/1
300 CAPSULE ORAL 2 TIMES DAILY
Qty: 20 CAP | Refills: 0 | Status: SHIPPED | OUTPATIENT
Start: 2020-04-03 | End: 2020-04-13

## 2020-04-03 NOTE — PROGRESS NOTES
David Sexton is a 46 y.o. female who was seen by synchronous (real-time) audio-video technology on 4/3/2020. The services were provided through telehealth via [employer] me virtual video  The location of the Patient/Parent: Home  The location of the Provider: Office  Time spent on encounter: 8 minutes. Assessment & Plan:   Diagnoses and all orders for this visit:    1. Non-recurrent acute serous otitis media, unspecified laterality  - continue with Zyrtec  - cefdinir (OMNICEF) 300 mg capsule; Take 1 Cap by mouth two (2) times a day for 10 days. - signs/symptoms that would warrant reevaluation discussed     CPT Codes 41974-50330 for Established Patients may apply to this Telehealth Visit    Subjective:   David Sexton was seen for Ear Pain (Bilateral, worse on the left.)    Has had pressure and popping of the ears for the past several days with pain starting last night. Associated with nasal congestion, postnasal drainage, headache, and maxillary sinus tenderness. Sounds have been muffled. No fever; reports temp 99.1 F this morning. Denies otorrhea, recent swimming. Has started taking Zyrtec. Prior to Admission medications    Medication Sig Start Date End Date Taking? Authorizing Provider   triamcinolone acetonide (KENALOG) 0.1 % topical cream Apply  to affected area two (2) times daily as needed for Skin Irritation or Itching. 1/29/20   Uziel Frost MD   glucose blood VI test strips (PRODIGY NO CODING) strip Check BG daily as directed. 1/7/20   Uziel Frost MD   atorvastatin (LIPITOR) 40 mg tablet Take 1 Tab by mouth daily. 9/17/19   Provider, Historical   lisinopril-hydroCHLOROthiazide (PRINZIDE, ZESTORETIC) 20-25 mg per tablet Take 1 Tab by mouth daily. 11/4/19   Provider, Historical   metFORMIN (GLUCOPHAGE) 500 mg tablet Take 500 mg by mouth daily. Irina, MD Bertha   hydrOXYzine HCl (ATARAX) 50 mg tablet Take 50 mg by mouth three (3) times daily as needed for Itching.     Bertha Arevalo MD   aspirin delayed-release 81 mg tablet Take 81 mg by mouth daily. Other, MD Bertha   methocarbamol (ROBAXIN) 750 mg tablet Take 1 Tab by mouth four (4) times daily as needed (Muscle spasm). Indications: muscle spasm 11/17/19   Dillon EDMONDS NP   albuterol (PROVENTIL HFA, VENTOLIN HFA, PROAIR HFA) 90 mcg/actuation inhaler Take 2 Puffs by inhalation every four (4) hours as needed for Wheezing. 12/2/18   Evelyn Freed PA-C     Allergies   Allergen Reactions    Hydrocodone Hives       Current Outpatient Medications   Medication Sig Dispense Refill    ergocalciferol (Vitamin D2) 1,250 mcg (50,000 unit) capsule Take 50,000 Units by mouth every seven (7) days.  triamcinolone acetonide (KENALOG) 0.1 % topical cream Apply  to affected area two (2) times daily as needed for Skin Irritation or Itching. 30 g 2    glucose blood VI test strips (PRODIGY NO CODING) strip Check BG daily as directed. 50 Strip 11    atorvastatin (LIPITOR) 40 mg tablet Take 1 Tab by mouth daily. 3    lisinopril-hydroCHLOROthiazide (PRINZIDE, ZESTORETIC) 20-25 mg per tablet Take 1 Tab by mouth daily. 3    metFORMIN (GLUCOPHAGE) 500 mg tablet Take 500 mg by mouth daily.  hydrOXYzine HCl (ATARAX) 50 mg tablet Take 50 mg by mouth three (3) times daily as needed for Itching.  aspirin delayed-release 81 mg tablet Take 81 mg by mouth daily.  methocarbamol (ROBAXIN) 750 mg tablet Take 1 Tab by mouth four (4) times daily as needed (Muscle spasm). Indications: muscle spasm 30 Tab 0    albuterol (PROVENTIL HFA, VENTOLIN HFA, PROAIR HFA) 90 mcg/actuation inhaler Take 2 Puffs by inhalation every four (4) hours as needed for Wheezing.  1 Inhaler 0       Allergies   Allergen Reactions    Hydrocodone Hives       Past Medical History:   Diagnosis Date    Diabetes ([facility] 04/2019    Diabetes type 2, controlled ([facility]     Hypertension     Ill-defined condition     PE    Panic attack     Pneumonia     Psychiatric disorder     Pulmonary embolism ([facility] 2015    treated with oral antiocoagulants for 12 months    Severe obstructive sleep apnea 03/2020    Urticaria      Social History     Tobacco Use    Smoking status: Former Smoker     Packs/day: 0.25    Smokeless tobacco: Never Used   Substance Use Topics    Alcohol use: No     Comment: rarely          ROS  Pertinent items noted in HPI    Objective:     General: alert, cooperative, no distress, well appearing   Mental  status: mental status: alert, oriented to person, place, and time, normal mood, behavior, speech, dress, motor activity, and thought processes   Resp: resp: normal effort and no respiratory distress   Neuro: neuro: no gross deficits   Skin: skin: no discoloration or lesions of concern on visible areas     Due to this being a TeleHealth evaluation, many elements of the physical examination are unable to be assessed. We discussed the expected course, resolution and complications of the diagnosis(es) in detail. Medication risks, benefits, costs, interactions, and alternatives were discussed as indicated. I advised her to contact the office if her condition worsens, changes or fails to improve as anticipated. She expressed understanding with the diagnosis(es) and plan. Pursuant to the emergency declaration under the [facility], [facility] waiver authority and the [employer] and [employer] General Act, this Virtual  Visit was conducted, with patient's consent, to reduce the patient's risk of exposure to COVID-19 and provide continuity of care for an established patient. Services were provided through a video synchronous discussion virtually to substitute for in-person clinic visit.     Vinicius Jones MD

## 2020-04-03 NOTE — TELEPHONE ENCOUNTER
Reason for Disposition   Diabetes mellitus or a weak immune system (e.g., HIV positive, cancer chemotherapy, transplant patient)    Answer Assessment - Initial Assessment Questions  1. LOCATION: \"Which ear is involved? \"      Bilateral but L > R    2. ONSET: \"When did the ear start hurting\"        \"Popping\"  for past several days and pain started last night    3. SEVERITY: \"How bad is the pain? \"  (Scale 1-10; mild, moderate or severe)    - MILD (1-3): doesn't interfere with normal activities     - MODERATE (4-7): interferes with normal activities or awakens from sleep     - SEVERE (8-10): excruciating pain, unable to do any normal activities       2-3/10 at this time    4. URI SYMPTOMS: \" Do you have a runny nose or cough? \"      Ears itchy, cough mostly in the morning    5. FEVER: \"Do you have a fever? \" If so, ask: \"What is your temperature, how was it measured, and when did it start? \"      Denies - temp is 98.3 at time of call    6. CAUSE: \"Have you been swimming recently? \", \"How often do you use Q-TIPS? \", \"Have you had any recent air travel or scuba diving? \"      Denies    7. OTHER SYMPTOMS: \"Do you have any other symptoms? \" (e.g., headache, stiff neck, dizziness, vomiting, runny nose, decreased hearing)      Denies - but hears a muffled sound when the ears pop    8. PREGNANCY: \"Is there any chance you are pregnant? \" \"When was your last menstrual period? \"      Denies    Protocols used: EARACHE-ADULT-OH    Pt informed of disposition . Care advice as documented. She is agreeable to call PCP for video visit. Please do not respond to nurse triage. All additional communication should be with the patient.

## 2020-04-08 DIAGNOSIS — E55.9 VITAMIN D DEFICIENCY: Primary | ICD-10-CM

## 2020-04-08 RX ORDER — ERGOCALCIFEROL 1.25 MG/1
50000 CAPSULE ORAL
Qty: 12 CAP | Refills: 0 | Status: SHIPPED | OUTPATIENT
Start: 2020-04-08 | End: 2020-09-22

## 2020-04-17 ENCOUNTER — VIRTUAL VISIT (OUTPATIENT)
Dept: FAMILY MEDICINE CLINIC | Age: 40
End: 2020-04-17

## 2020-04-17 VITALS
WEIGHT: 267 LBS | DIASTOLIC BLOOD PRESSURE: 92 MMHG | HEART RATE: 67 BPM | SYSTOLIC BLOOD PRESSURE: 143 MMHG | TEMPERATURE: 98.6 F | HEIGHT: 63 IN | BODY MASS INDEX: 47.31 KG/M2

## 2020-04-17 DIAGNOSIS — R07.89 CHEST DISCOMFORT: Primary | ICD-10-CM

## 2020-04-17 PROBLEM — E11.9 DIABETES (HCC): Status: ACTIVE | Noted: 2019-04-01

## 2020-04-17 RX ORDER — CETIRIZINE HCL 10 MG
10 TABLET ORAL DAILY
COMMUNITY

## 2020-04-17 NOTE — PROGRESS NOTES
Identified pt with two pt identifiers(name and ). Chief Complaint   Patient presents with    Shortness of Breath     yesterday    Blood Pressure Check     high started yesterday        Health Maintenance Due   Topic    Pneumococcal 0-64 years (1 of 1 - PPSV23)    Foot Exam Q1     MICROALBUMIN Q1     DTaP/Tdap/Td series (1 - Tdap)    PAP AKA CERVICAL CYTOLOGY     Lipid Screen        Wt Readings from Last 3 Encounters:   20 267 lb (121.1 kg)   20 271 lb (122.9 kg)   20 278 lb (126.1 kg)     Temp Readings from Last 3 Encounters:   20 98.6 °F (37 °C)   20 98.6 °F (37 °C) (Oral)   20 98.6 °F (37 °C) (Oral)     BP Readings from Last 3 Encounters:   20 (!) 143/92   20 120/60   20 120/78     Pulse Readings from Last 3 Encounters:   20 67   20 70   20 72         Learning Assessment:  :     Learning Assessment 2019   PRIMARY LEARNER Patient   PRIMARY LANGUAGE ENGLISH   LEARNER PREFERENCE PRIMARY DEMONSTRATION   ANSWERED BY self   RELATIONSHIP SELF       Depression Screening:  :     3 most recent PHQ Screens 2020   Little interest or pleasure in doing things Not at all   Feeling down, depressed, irritable, or hopeless Not at all   Total Score PHQ 2 0       Fall Risk Assessment:  :     No flowsheet data found. Abuse Screening:  :     Abuse Screening Questionnaire 2019   Do you ever feel afraid of your partner? N N   Are you in a relationship with someone who physically or mentally threatens you? N N   Is it safe for you to go home?  Y Y       Coordination of Care Questionnaire:  :     1) Have you been to an emergency room, urgent care clinic since your last visit? no   Hospitalized since your last visit? no             2) Have you seen or consulted any other health care providers outside of [address] [place] [address] since your last visit? no  (Include any pap smears or colon screenings in this section.)    3) Do you have an Advance Directive on file? no  Are you interested in receiving information about Advance Directives? no        Reviewed record in preparation for visit and have obtained necessary documentation.

## 2020-04-17 NOTE — PROGRESS NOTES
Consent: Brendia Hatchet, who was seen by synchronous (real-time) audio-video technology, and/or her healthcare decision maker, is aware that this patient-initiated, Telehealth encounter on 4/17/2020 is a billable service, with coverage as determined by her insurance carrier. She is aware that she may receive a bill and has provided verbal consent to proceed: Yes. Assessment & Plan:   Diagnoses and all orders for this visit:    1. Chest discomfort: onset yesterday, still present this morning but has improved per report. Given her medical history and symptoms, she needs to be evaluated as soon as possible and recommended ED evaluation. Patient verbalizes understanding and states that she will also call her Cardiologist.     Subjective:   Brendia Hatchet is a 46 y.o. female who was seen for Shortness of Breath (yesterday) and Blood Pressure Check (high started yesterday)    Reports onset of heart palpitations and trouble with her breathing last night. Associated with dizziness, chest discomfort, and back pain. BP at that time noted to be 145/90. Unsure if whether she should have been to the ED, but decided to lay down. This morning /93. Still with chest discomfort located in the mid-chest to the left anterior chest, pain does extend underneath the arm. States that there is a squeezing in her chest like someone is sitting on her chest. Feels better today. No recent heavy lifting or pulling. Has been compliant with her medications. Prior to Admission medications    Medication Sig Start Date End Date Taking? Authorizing Provider   cetirizine (ZyrTEC) 10 mg tablet Take 10 mg by mouth daily. Yes Provider, Historical   ergocalciferol (Vitamin D2) 1,250 mcg (50,000 unit) capsule Take 1 Cap by mouth every seven (7) days.  4/8/20  Yes Clif Hawthorne MD   triamcinolone acetonide (KENALOG) 0.1 % topical cream APPLY TO AFFECTED AREA TWO (2) TIMES DAILY AS NEEDED FOR SKIN IRRITATION OR ITCHING. 4/3/20 Yes Venkatesh Rodarte MD   glucose blood VI test strips (PRODIGY NO CODING) strip Check BG daily as directed. 1/7/20  Yes Venkatesh Rodarte MD   atorvastatin (LIPITOR) 40 mg tablet Take 1 Tab by mouth daily. 9/17/19  Yes Provider, Historical   lisinopril-hydroCHLOROthiazide (PRINZIDE, ZESTORETIC) 20-25 mg per tablet Take 1 Tab by mouth daily. 11/4/19  Yes Provider, Historical   metFORMIN (GLUCOPHAGE) 500 mg tablet Take 500 mg by mouth daily. Yes Bertha Arevalo MD   aspirin delayed-release 81 mg tablet Take 81 mg by mouth daily. Yes Bertha Arevalo MD   hydrOXYzine HCl (ATARAX) 50 mg tablet Take 50 mg by mouth three (3) times daily as needed for Itching. Other, MD Bertha   methocarbamol (ROBAXIN) 750 mg tablet Take 1 Tab by mouth four (4) times daily as needed (Muscle spasm). Indications: muscle spasm 11/17/19   Marcelino EDMONDS NP   albuterol (PROVENTIL HFA, VENTOLIN HFA, PROAIR HFA) 90 mcg/actuation inhaler Take 2 Puffs by inhalation every four (4) hours as needed for Wheezing.  12/2/18   Polo ESTEFANI Galloway     Allergies   Allergen Reactions    Hydrocodone Hives       Patient Active Problem List    Diagnosis Date Noted    Hypertension     Severe obstructive sleep apnea 03/2020    Obesity, morbid ([facility] 01/16/2020    Diabetes ([facility] 04/2019    Panic attack 10/16/2015    Urticaria 10/16/2015    Tobacco abuse 10/16/2015    History of pulmonary embolism 10/16/2015     Past Medical History:   Diagnosis Date    Diabetes ([address] 04/2019    Diabetes type 2, controlled ([address]     Hypertension     Ill-defined condition     PE    Panic attack     Pneumonia     Psychiatric disorder     Pulmonary embolism ([facility] 2015    treated with oral antiocoagulants for 12 months    Severe obstructive sleep apnea 03/2020    Urticaria      Social History     Tobacco Use    Smoking status: Former Smoker     Packs/day: 0.25    Smokeless tobacco: Never Used   Substance Use Topics    Alcohol use: No     Comment: rarely ROS  Pertinent items noted in HPI    Objective:     Visit Vitals  BP (!) 143/92 Comment: Patient reported   Pulse 67   Temp 98.6 °F (37 °C)   Ht 5' 3\" (1.6 m)   Wt 267 lb (121.1 kg)   LMP 04/10/2020   BMI 47.30 kg/m²      General: alert, cooperative, no distress   Mental  status: normal mood, behavior, speech, dress, motor activity, and thought processes, able to follow commands   HENT: NCAT   Neck: no visualized mass   Resp: no respiratory distress   Neuro: no gross deficits   Skin: no discoloration or lesions of concern on visible areas   Psychiatric: normal affect, consistent with stated mood, no evidence of hallucinations       We discussed the expected course, resolution and complications of the diagnosis(es) in detail. Medication risks, benefits, costs, interactions, and alternatives were discussed as indicated. I advised her to contact the office if her condition worsens, changes or fails to improve as anticipated. She expressed understanding with the diagnosis(es) and plan. Amy Thompson is a 46 y.o. female being evaluated by a video visit encounter for concerns as above. A caregiver was present when appropriate. Due to this being a TeleHealth encounter (During TUJUV-10 public [facility] emergency), evaluation of the following organ systems was limited: Vitals/Constitutional/EENT/Resp/CV/GI//MS/Neuro/Skin/Heme-Lymph-Imm. Pursuant to the emergency declaration under the [address] [place] [address], [facility] waiver authority and the [email or web address] and [employer] General Act, this Virtual  Visit was conducted, with patient's (and/or legal guardian's) consent, to reduce the patient's risk of exposure to COVID-19 and provide necessary medical care. Services were provided through a video synchronous discussion virtually to substitute for in-person clinic visit. Patient and provider were located at their individual homes.         Yaa Stinson MD

## 2020-05-13 ENCOUNTER — VIRTUAL VISIT (OUTPATIENT)
Dept: FAMILY MEDICINE CLINIC | Age: 40
End: 2020-05-13

## 2020-05-13 VITALS — BODY MASS INDEX: 47.3 KG/M2 | HEIGHT: 63 IN

## 2020-05-13 DIAGNOSIS — R10.12 LEFT UPPER QUADRANT ABDOMINAL PAIN: Primary | ICD-10-CM

## 2020-05-13 NOTE — PROGRESS NOTES
HISTORY OF PRESENT ILLNESS  Corie Villatoro is a 46 y.o. female. HPI  Pt presents with \"pain under left breast\"  Visit was conducted via [employer] me  Pt was located at home, provider was located at [address] [place] [address]  Visit lasted 5 minutes  Consent: Corie Villatoro, who was seen by synchronous (real-time) audio-video technology, and/or her healthcare decision maker, is aware that this patient-initiated, Telehealth encounter on 5/13/2020 is a billable service, with coverage as determined by her insurance carrier. She is aware that she may receive a bill and has provided verbal consent to proceed: Yes. Pt states that she has been dealing with abdominal pain for 2 days  Yesterday and last night, it was sharp pain. Now the pain feels like pressure, and is not as bad  The pain is on left side of abdomen, underneath her left ribs  No chest pain, no shortness of breath  She has been dealing with nausea as well  No fever  At times, the pain goes to her back, as well. Review of Systems   Constitutional: Negative for fever. Gastrointestinal: Positive for abdominal pain. Physical Exam  Constitutional:       Appearance: Normal appearance. Neurological:      Mental Status: She is alert. Psychiatric:         Mood and Affect: Mood normal.         Behavior: Behavior normal.         ASSESSMENT and PLAN    ICD-10-CM ICD-9-CM    1. Left upper quadrant abdominal pain R10.12 789.02      Educated about needing to be seen in person for assessment. Will have front office call and make an appointment at [facility] for patient. Educated patient that should pain worsen and/or continue, any chest pain, shortness of breath, etc, she should go straight to the ER    Pt informed to return to office with worsening of symptoms, or PRN with any questions or concerns. Pt verbalizes understanding of plan of care and denies further questions or concerns at this time.

## 2020-05-13 NOTE — PROGRESS NOTES
Identified pt with two pt identifiers(name and ). Chief Complaint   Patient presents with    Abdominal Pain     upper abdominal pain near the bottom rib    Nausea    Headache        Health Maintenance Due   Topic    Pneumococcal 0-64 years (1 of 1 - PPSV23)    Foot Exam Q1     MICROALBUMIN Q1     DTaP/Tdap/Td series (1 - Tdap)    PAP AKA CERVICAL CYTOLOGY        Wt Readings from Last 3 Encounters:   20 267 lb (121.1 kg)   20 271 lb (122.9 kg)   20 278 lb (126.1 kg)     Temp Readings from Last 3 Encounters:   20 98.6 °F (37 °C)   20 98.6 °F (37 °C) (Oral)   20 98.6 °F (37 °C) (Oral)     BP Readings from Last 3 Encounters:   20 (!) 143/92   20 120/60   20 120/78     Pulse Readings from Last 3 Encounters:   20 67   20 70   20 72         Learning Assessment:  :     Learning Assessment 2019   PRIMARY LEARNER Patient   PRIMARY LANGUAGE ENGLISH   LEARNER PREFERENCE PRIMARY DEMONSTRATION   ANSWERED BY self   RELATIONSHIP SELF       Depression Screening:  :     3 most recent PHQ Screens 2020   Little interest or pleasure in doing things Not at all   Feeling down, depressed, irritable, or hopeless Not at all   Total Score PHQ 2 0       Fall Risk Assessment:  :     No flowsheet data found. Abuse Screening:  :     Abuse Screening Questionnaire 2019   Do you ever feel afraid of your partner? N N   Are you in a relationship with someone who physically or mentally threatens you? N N   Is it safe for you to go home?  Y Y         Coordination of Care Questionnaire:  :     1) Have you been to an emergency room, urgent care clinic since your last visit? no   Hospitalized since your last visit? no             2) Have you seen or consulted any other health care providers outside of [address] [place] [address] since your last visit? no  (Include any pap smears or colon screenings in this section.)    3) Do you have an Advance Directive on file? no  Are you interested in receiving information about Advance Directives? no      Reviewed record in preparation for visit and have obtained necessary documentation. Medication reconciliation up to date and corrected with patient at this time.

## 2020-05-14 ENCOUNTER — TELEPHONE (OUTPATIENT)
Dept: FAMILY MEDICINE CLINIC | Age: 40
End: 2020-05-14

## 2020-05-14 DIAGNOSIS — K59.00 CONSTIPATION, UNSPECIFIED CONSTIPATION TYPE: Primary | ICD-10-CM

## 2020-05-14 NOTE — TELEPHONE ENCOUNTER
She said they asked her if she had nausea. She said the male nurse said the doctor would not listen and said to go to [address] [facility] [address]. She just went home. I asked her how she was feeling. She said she did not have the sharp pain anymore. I asked what she had had to eat. She was aware of her diet and gallbladder issues. She said when she would start to eat she would get sick and vomit. I asked when her last BM was and she said she feels like she needs to have a BM. She has not been going. She has tried metamucil and stool softeners. She is drinking lots of water. I asked her if she would do a XR. It would show if she has stool or not. She said yes. I told her I would ask Amparo Jensen. She said she would go tomorrow if she still felt bad. I told her to call me with any problems. She was very nice. I spoke to Amparo Jensen and she agreed. Order was written.
We had made a appt at [facility] for her. When she arrived today they turned her away because of nausea and abd pain and diarrhea. We had seen her for those reasons virtually and referred her to a green clinic. LVM - I am very sorry about today. Please call me back to discuss what to do next.
coffee

## 2020-05-29 ENCOUNTER — VIRTUAL VISIT (OUTPATIENT)
Dept: FAMILY MEDICINE CLINIC | Age: 40
End: 2020-05-29

## 2020-05-29 ENCOUNTER — HOSPITAL ENCOUNTER (OUTPATIENT)
Dept: VASCULAR SURGERY | Age: 40
Discharge: HOME | End: 2020-05-29
Attending: FAMILY MEDICINE
Payer: MEDICAID

## 2020-05-29 VITALS
DIASTOLIC BLOOD PRESSURE: 70 MMHG | SYSTOLIC BLOOD PRESSURE: 128 MMHG | WEIGHT: 267 LBS | HEIGHT: 63 IN | BODY MASS INDEX: 47.31 KG/M2

## 2020-05-29 DIAGNOSIS — M79.651 PAIN OF RIGHT THIGH: Primary | ICD-10-CM

## 2020-05-29 DIAGNOSIS — Z86.718 HISTORY OF DVT (DEEP VEIN THROMBOSIS): ICD-10-CM

## 2020-05-29 DIAGNOSIS — M79.651 PAIN OF RIGHT THIGH: ICD-10-CM

## 2020-05-29 DIAGNOSIS — R42 DIZZINESS: ICD-10-CM

## 2020-05-29 PROCEDURE — 93971 EXTREMITY STUDY: CPT

## 2020-05-29 NOTE — PROGRESS NOTES
Luc Suresh is a 46 y.o. female who was seen by synchronous (real-time) audio-video technology on 2020. Consent: Luc Suresh, who was seen by synchronous (real-time) audio-video technology, and/or her healthcare decision maker, is aware that this patient-initiated, Telehealth encounter on 2020 is a billable service, with coverage as determined by her insurance carrier. She is aware that she may receive a bill and has provided verbal consent to proceed: Yes. Assessment & Plan:   Diagnoses and all orders for this visit:    1. Pain of right thigh: s/p long distance travel with some improvement today. Given her history and recent travel will evaluate for DVT. Continue with Tylenol as needed for pain. -     DUPLEX LOWER EXT VENOUS RIGHT; Future    2. History of DVT (deep vein thrombosis)    3. Dizziness: episodic. Likely multifactorial - lower than normal BG, dehydration. Push fluids. 712  Subjective:   Luc Suresh is a 46 y.o. female who was seen for Leg Pain (right upper thigh - went to [place] and went she got back - better today); Dizziness (BS was low - she went to  - was not eating); and Nausea    Pain in the right inner thigh with radiation into the groin. Onset ~2 days ago and noticed after long distance car ride to [place]. No known injury or trauma noted. She does have previous h/o DVT and has concern for. Pain described as stabbing and throbbing in quality; some improvement noted today but she has been taking Tylenol consistently. Also with episode of dizziness 2 days ago. States that she had not been eating or drinking well and weather was hot in [place]. BG 98 which is lower than normal for her. Associated with some mild nausea. Prior to Admission medications    Medication Sig Start Date End Date Taking? Authorizing Provider   cetirizine (ZyrTEC) 10 mg tablet Take 10 mg by mouth daily.    Yes Provider, Historical   ergocalciferol (Vitamin D2) 1,250 mcg (50,000 unit) capsule Take 1 Cap by mouth every seven (7) days. 4/8/20  Yes Uziel Frost MD   glucose blood VI test strips (PRODIGY NO CODING) strip Check BG daily as directed. 1/7/20  Yes Uziel Frost MD   atorvastatin (LIPITOR) 40 mg tablet Take 1 Tab by mouth daily. 9/17/19  Yes Provider, Historical   lisinopril-hydroCHLOROthiazide (PRINZIDE, ZESTORETIC) 20-25 mg per tablet Take 1 Tab by mouth daily. 11/4/19  Yes Provider, Historical   metFORMIN (GLUCOPHAGE) 500 mg tablet Take 500 mg by mouth daily. Yes Other, MD Bertha   aspirin delayed-release 81 mg tablet Take 81 mg by mouth daily. Yes Other, MD Bertha   triamcinolone acetonide (KENALOG) 0.1 % topical cream APPLY TO AFFECTED AREA TWO (2) TIMES DAILY AS NEEDED FOR SKIN IRRITATION OR ITCHING. 4/3/20   Uziel Frost MD   hydrOXYzine HCl (ATARAX) 50 mg tablet Take 50 mg by mouth three (3) times daily as needed for Itching.  5/29/20  Other, MD Bertha   methocarbamol (ROBAXIN) 750 mg tablet Take 1 Tab by mouth four (4) times daily as needed (Muscle spasm). Indications: muscle spasm 11/17/19 5/29/20  Kitty EDMONDS, NP   albuterol (PROVENTIL HFA, VENTOLIN HFA, PROAIR HFA) 90 mcg/actuation inhaler Take 2 Puffs by inhalation every four (4) hours as needed for Wheezing.  12/2/18   Vania Mayfield PA-C     Allergies   Allergen Reactions    Hydrocodone Hives       Past Medical History:   Diagnosis Date    Diabetes ([facility] 04/2019    Diabetes type 2, controlled ([facility]     Hypertension     Ill-defined condition     PE    Panic attack     Pneumonia     Psychiatric disorder     Pulmonary embolism ([facility] 2015    treated with oral antiocoagulants for 12 months    Severe obstructive sleep apnea 03/2020    Urticaria      Social History     Tobacco Use    Smoking status: Former Smoker     Packs/day: 0.25    Smokeless tobacco: Never Used   Substance Use Topics    Alcohol use: No     Comment: rarely       ROS  Pertinent items noted in HPI    Objective:     Visit Vitals  BP 128/70 Comment: patient reported   Ht 5' 3\" (1.6 m)   Wt 267 lb (121.1 kg)   LMP 04/30/2020   BMI 47.30 kg/m²      General: alert, cooperative, no distress   Mental  status: normal mood, behavior, speech, dress, motor activity, and thought processes, able to follow commands   HENT: NCAT   Neck: no visualized mass   Resp: no respiratory distress   Neuro: no gross deficits   Skin: no discoloration or lesions of concern on visible areas   Psychiatric: normal affect, consistent with stated mood, no evidence of hallucinations       We discussed the expected course, resolution and complications of the diagnosis(es) in detail. Medication risks, benefits, costs, interactions, and alternatives were discussed as indicated. I advised her to contact the office if her condition worsens, changes or fails to improve as anticipated. She expressed understanding with the diagnosis(es) and plan. Jamarcus Saleh is a 46 y.o. female who was evaluated by a video visit encounter for concerns as above. Patient identification was verified prior to start of the visit. A caregiver was present when appropriate. Due to this being a TeleHealth encounter (During [facility] public health emergency), evaluation of the following organ systems was limited: Vitals/Constitutional/EENT/Resp/CV/GI//MS/Neuro/Skin/Heme-Lymph-Imm. Pursuant to the emergency declaration under the [facility], 1135 waiver authority and the [employer] General Act, this Virtual  Visit was conducted, with patient's (and/or legal guardian's) consent, to reduce the patient's risk of exposure to COVID-19 and provide necessary medical care. Services were provided through a video synchronous discussion virtually to substitute for in-person clinic visit. Patient and provider were located at their individual homes.       Eliseo Fraser MD

## 2020-05-29 NOTE — PROGRESS NOTES
Identified pt with two pt identifiers(name and ). Chief Complaint   Patient presents with    Leg Pain     right upper thigh - went to [place] and went she got back - better today    Dizziness     BS was low - she went to  - was not eating    Nausea        Health Maintenance Due   Topic    Pneumococcal 0-64 years (1 of 1 - PPSV23)    Foot Exam Q1     MICROALBUMIN Q1     DTaP/Tdap/Td series (1 - Tdap)    PAP AKA CERVICAL CYTOLOGY        Wt Readings from Last 3 Encounters:   20 267 lb (121.1 kg)   20 267 lb (121.1 kg)   20 271 lb (122.9 kg)     Temp Readings from Last 3 Encounters:   20 98.6 °F (37 °C)   20 98.6 °F (37 °C) (Oral)   20 98.6 °F (37 °C) (Oral)     BP Readings from Last 3 Encounters:   20 128/70   20 (!) 143/92   20 120/60     Pulse Readings from Last 3 Encounters:   20 67   20 70   20 72         Learning Assessment:  :     Learning Assessment 2019   PRIMARY LEARNER Patient   PRIMARY LANGUAGE ENGLISH   LEARNER PREFERENCE PRIMARY DEMONSTRATION   ANSWERED BY self   RELATIONSHIP SELF       Depression Screening:  :     3 most recent PHQ Screens 2020   Little interest or pleasure in doing things Not at all   Feeling down, depressed, irritable, or hopeless Not at all   Total Score PHQ 2 0       Fall Risk Assessment:  :     No flowsheet data found. Abuse Screening:  :     Abuse Screening Questionnaire 2019   Do you ever feel afraid of your partner? N N   Are you in a relationship with someone who physically or mentally threatens you? N N   Is it safe for you to go home? Y Y       Coordination of Care Questionnaire:  :     1) Have you been to an emergency room, urgent care clinic since your last visit? no   Hospitalized since your last visit? no             2) Have you seen or consulted any other health care providers outside of [address] [place] [address] since your last visit?  yes  Kai MIGUEL, Diet(Include any pap smears or colon screenings in this section.)    3) Do you have an Advance Directive on file? no  Are you interested in receiving information about Advance Directives? no    Reviewed record in preparation for visit and have obtained necessary documentation. Medication reconciliation up to date and corrected with patient at this time.

## 2020-06-12 ENCOUNTER — HOSPITAL ENCOUNTER (OUTPATIENT)
Dept: LAB | Age: 40
Discharge: HOME | End: 2020-06-12

## 2020-06-12 ENCOUNTER — OFFICE VISIT (OUTPATIENT)
Dept: FAMILY MEDICINE CLINIC | Age: 40
End: 2020-06-12

## 2020-06-12 VITALS
BODY MASS INDEX: 47.2 KG/M2 | SYSTOLIC BLOOD PRESSURE: 122 MMHG | TEMPERATURE: 98.6 F | HEART RATE: 87 BPM | HEIGHT: 63 IN | DIASTOLIC BLOOD PRESSURE: 80 MMHG | RESPIRATION RATE: 18 BRPM | WEIGHT: 266.4 LBS | OXYGEN SATURATION: 98 %

## 2020-06-12 DIAGNOSIS — Z23 ENCOUNTER FOR IMMUNIZATION: ICD-10-CM

## 2020-06-12 DIAGNOSIS — E11.9 CONTROLLED TYPE 2 DIABETES MELLITUS WITHOUT COMPLICATION, WITHOUT LONG-TERM CURRENT USE OF INSULIN (HCC): ICD-10-CM

## 2020-06-12 DIAGNOSIS — Z01.419 ENCOUNTER FOR WELL WOMAN EXAM WITH ROUTINE GYNECOLOGICAL EXAM: ICD-10-CM

## 2020-06-12 DIAGNOSIS — E55.9 VITAMIN D DEFICIENCY: ICD-10-CM

## 2020-06-12 DIAGNOSIS — Z12.4 SCREENING FOR MALIGNANT NEOPLASM OF CERVIX: ICD-10-CM

## 2020-06-12 DIAGNOSIS — Z01.419 ENCOUNTER FOR WELL WOMAN EXAM WITH ROUTINE GYNECOLOGICAL EXAM: Primary | ICD-10-CM

## 2020-06-12 LAB
ALBUMIN SERPL-MCNC: 3.9 G/DL (ref 3.5–5)
ALBUMIN/GLOB SERPL: 1.1 {RATIO} (ref 1.1–2.2)
ALP SERPL-CCNC: 50 U/L (ref 45–117)
ALT SERPL-CCNC: 24 U/L (ref 12–78)
ANION GAP SERPL CALC-SCNC: 7 MMOL/L (ref 5–15)
AST SERPL-CCNC: 11 U/L (ref 15–37)
BILIRUB SERPL-MCNC: 0.9 MG/DL (ref 0.2–1)
BUN SERPL-MCNC: 15 MG/DL (ref 6–20)
BUN/CREAT SERPL: 23 (ref 12–20)
CALCIUM SERPL-MCNC: 9.7 MG/DL (ref 8.5–10.1)
CHLORIDE SERPL-SCNC: 105 MMOL/L (ref 97–108)
CHOLEST SERPL-MCNC: 137 MG/DL
CO2 SERPL-SCNC: 26 MMOL/L (ref 21–32)
COMMENT, HOLDF: NORMAL
CREAT SERPL-MCNC: 0.65 MG/DL (ref 0.55–1.02)
CREAT UR-MCNC: 86 MG/DL
EST. AVERAGE GLUCOSE BLD GHB EST-MCNC: 111 MG/DL
GLOBULIN SER CALC-MCNC: 3.7 G/DL (ref 2–4)
GLUCOSE SERPL-MCNC: 89 MG/DL (ref 65–100)
HBA1C MFR BLD: 5.5 % (ref 4–5.6)
HDLC SERPL-MCNC: 39 MG/DL
HDLC SERPL: 3.5 {RATIO} (ref 0–5)
LDLC SERPL CALC-MCNC: 65.8 MG/DL (ref 0–100)
LIPID PROFILE,FLP: ABNORMAL
MICROALBUMIN UR-MCNC: 8.06 MG/DL
MICROALBUMIN/CREAT UR-RTO: 94 MG/G (ref 0–30)
POTASSIUM SERPL-SCNC: 3.8 MMOL/L (ref 3.5–5.1)
PROT SERPL-MCNC: 7.6 G/DL (ref 6.4–8.2)
SAMPLES BEING HELD,HOLD: NORMAL
SODIUM SERPL-SCNC: 138 MMOL/L (ref 136–145)
TRIGL SERPL-MCNC: 161 MG/DL (ref ?–150)
VLDLC SERPL CALC-MCNC: 32.2 MG/DL

## 2020-06-12 NOTE — PROGRESS NOTES
Identified pt with two pt identifiers(name and ). Chief Complaint   Patient presents with   [facility]    Back Pain     left lower with abdominal pain    Swelling     lt hand a wk ago- today lt foot         Health Maintenance Due   Topic    Pneumococcal 0-64 years (1 of 1 - PPSV23)    Foot Exam Q1     MICROALBUMIN Q1     DTaP/Tdap/Td series (1 - Tdap)    PAP AKA CERVICAL CYTOLOGY        Wt Readings from Last 3 Encounters:   20 266 lb 6.4 oz (120.8 kg)   20 267 lb (121.1 kg)   20 267 lb (121.1 kg)     Temp Readings from Last 3 Encounters:   20 98.6 °F (37 °C) (Oral)   20 98.6 °F (37 °C)   20 98.6 °F (37 °C) (Oral)     BP Readings from Last 3 Encounters:   20 122/80   20 128/70   20 (!) 143/92     Pulse Readings from Last 3 Encounters:   20 87   20 67   20 70         Learning Assessment:  :     Learning Assessment 2019   PRIMARY LEARNER Patient   PRIMARY LANGUAGE ENGLISH   LEARNER PREFERENCE PRIMARY DEMONSTRATION   ANSWERED BY self   RELATIONSHIP SELF       Depression Screening:  :     3 most recent PHQ Screens 2020   Little interest or pleasure in doing things Not at all   Feeling down, depressed, irritable, or hopeless Not at all   Total Score PHQ 2 0       Fall Risk Assessment:  :     No flowsheet data found. Abuse Screening:  :     Abuse Screening Questionnaire 2019   Do you ever feel afraid of your partner? N N   Are you in a relationship with someone who physically or mentally threatens you? N N   Is it safe for you to go home? Y Y       Coordination of Care Questionnaire:  :     1) Have you been to an emergency room, urgent care clinic since your last visit? yes [facility] for back and abdominal pain  Hospitalized since your last visit? no             2) Have you seen or consulted any other health care providers outside of [address] [place] [address] since your last visit?  yes  Dr. Ca Serrato sleep study (Include any pap smears or colon screenings in this section.)    3) Do you have an Advance Directive on file? no  Are you interested in receiving information about Advance Directives? no      Reviewed record in preparation for visit and have obtained necessary documentation. e they are present in the room.

## 2020-06-12 NOTE — PATIENT INSTRUCTIONS
A Healthy Lifestyle: Care Instructions Your Care Instructions A healthy lifestyle can help you feel good, stay at a healthy weight, and have plenty of energy for both work and play. A healthy lifestyle is something you can share with your whole family. A healthy lifestyle also can lower your risk for serious health problems, such as high blood pressure, heart disease, and diabetes. You can follow a few steps listed below to improve your health and the health of your family. Follow-up care is a key part of your treatment and safety. Be sure to make and go to all appointments, and call your doctor if you are having problems. It's also a good idea to know your test results and keep a list of the medicines you take. How can you care for yourself at home? · Do not eat too much sugar, fat, or fast foods. You can still have dessert and treats now and then. The goal is moderation. · Start small to improve your eating habits. Pay attention to portion sizes, drink less juice and soda pop, and eat more fruits and vegetables. ? Eat a healthy amount of food. A 3-ounce serving of meat, for example, is about the size of a deck of cards. Fill the rest of your plate with vegetables and whole grains. ? Limit the amount of soda and sports drinks you have every day. Drink more water when you are thirsty. ? Eat at least 5 servings of fruits and vegetables every day. It may seem like a lot, but it is not hard to reach this goal. A serving or helping is 1 piece of fruit, 1 cup of vegetables, or 2 cups of leafy, raw vegetables. Have an apple or some carrot sticks as an afternoon snack instead of a candy bar. Try to have fruits and/or vegetables at every meal. 
· Make exercise part of your daily routine. You may want to start with simple activities, such as walking, bicycling, or slow swimming. Try to be active 30 to 60 minutes every day.  You do not need to do all 30 to 60 minutes all at once. For example, you can exercise 3 times a day for 10 or 20 minutes. Moderate exercise is safe for most people, but it is always a good idea to talk to your doctor before starting an exercise program. 
· Keep moving. [place] the lawn, work in the garden, or [employer]. Take the stairs instead of the elevator at work. · If you smoke, quit. People who smoke have an increased risk for heart attack, stroke, cancer, and other lung illnesses. Quitting is hard, but there are ways to boost your chance of quitting tobacco for good. ? Use nicotine gum, patches, or lozenges. ? Ask your doctor about stop-smoking programs and medicines. ? Keep trying. In addition to reducing your risk of diseases in the future, you will notice some benefits soon after you stop using tobacco. If you have shortness of breath or asthma symptoms, they will likely get better within a few weeks after you quit. · Limit how much alcohol you drink. Moderate amounts of alcohol (up to 2 drinks a day for men, 1 drink a day for women) are okay. But drinking too much can lead to liver problems, high blood pressure, and other health problems. Family health If you have a family, there are many things you can do together to improve your health. · Eat meals together as a family as often as possible. · Eat healthy foods. This includes fruits, vegetables, lean meats and dairy, and whole grains. · Include your family in your fitness plan. Most people think of activities such as jogging or tennis as the way to fitness, but there are many ways you and your family can be more active. Anything that makes you breathe hard and gets your heart pumping is exercise. Here are some tips: 
? Walk to do errands or to take your child to school or the bus. 
? Go for a family bike ride after dinner instead of watching TV. Where can you learn more? Go to http://randi-ned.info/ Enter C081 in the search box to learn more about \"A Healthy Lifestyle: Care Instructions. \" Current as of: January 31, 2020               Content Version: 12.5 © 2006-2020 [employer]. Care instructions adapted under license by [employer] (which disclaims liability or warranty for this information). If you have questions about a medical condition or this instruction, always ask your healthcare professional. [place] [address] any warranty or liability for your use of this information.

## 2020-06-12 NOTE — PROGRESS NOTES
Subjective:   Finesse Rogers is a 46 y.o. female here today for her annual wellness visit. Patient's last menstrual period was 06/06/2020 (exact date). Social History: single partner, contraception - none. Pertinent past medical hstory: thromboembolism. Menstrual history: menarche at age 16, occurring monthly, every 28 days, last for 3-5 days    Health Maintenance:  · Cervical cancer screening: ~5 years ago, no h/o abnormal Pap smears  · Mammogram: n/a, no known family h/o breast cancer  · Tetanus: unknown   · Pneumovax: none     Reports that her BG ranging 109-125 2 hours after dinner. Current Outpatient Medications   Medication Sig Dispense Refill    cetirizine (ZyrTEC) 10 mg tablet Take 10 mg by mouth daily.  ergocalciferol (Vitamin D2) 1,250 mcg (50,000 unit) capsule Take 1 Cap by mouth every seven (7) days. 12 Cap 0    triamcinolone acetonide (KENALOG) 0.1 % topical cream APPLY TO AFFECTED AREA TWO (2) TIMES DAILY AS NEEDED FOR SKIN IRRITATION OR ITCHING. 30 g 2    glucose blood VI test strips (PRODIGY NO CODING) strip Check BG daily as directed. 50 Strip 11    atorvastatin (LIPITOR) 40 mg tablet Take 1 Tab by mouth daily. 3    lisinopril-hydroCHLOROthiazide (PRINZIDE, ZESTORETIC) 20-25 mg per tablet Take 1 Tab by mouth daily. 3    metFORMIN (GLUCOPHAGE) 500 mg tablet Take 500 mg by mouth daily.  aspirin delayed-release 81 mg tablet Take 81 mg by mouth daily.          Allergies   Allergen Reactions    Hydrocodone Hives       Past Medical History:   Diagnosis Date    Diabetes ([facility] 04/2019    Diabetes type 2, controlled ([facility]     Hypertension     Ill-defined condition     PE    Panic attack     Pneumonia     Psychiatric disorder     Pulmonary embolism ([facility] 2015    treated with oral antiocoagulants for 12 months    Severe obstructive sleep apnea 03/2020    Urticaria        Social History     Tobacco Use    Smoking status: Former Smoker     Packs/day: 0.25    Smokeless tobacco: Never Used   Substance Use Topics    Alcohol use: No     Comment: rarely        ROS:  Feeling well. No dyspnea or chest pain on exertion. No abdominal pain, change in bowel habits, black or bloody stools. No urinary tract symptoms. GYN ROS: normal menses, no abnormal bleeding, pelvic pain or discharge, no breast pain or new or enlarging lumps on self exam. No neurological complaints. Objective:     Visit Vitals  /80 (BP 1 Location: Right arm, BP Patient Position: Sitting)   Pulse 87   Temp 98.6 °F (37 °C) (Oral)   Resp 18   Ht 5' 3\" (1.6 m)   Wt 266 lb 6.4 oz (120.8 kg)   LMP 06/06/2020 (Exact Date)   SpO2 98%   BMI 47.19 kg/m²     The patient appears well, alert, oriented x 3, in no distress. ENT normal.  Neck supple. No adenopathy or thyromegaly. SRI. Lungs are clear, good air entry, no wheezes, rhonchi or rales. S1 and S2 normal, no murmurs, regular rate and rhythm. Abdomen soft without tenderness, guarding, mass or organomegaly. Extremities show no edema, normal peripheral pulses. Neurological is normal, no focal findings. BREAST EXAM: breasts appear normal, no suspicious masses, no skin or nipple changes or axillary nodes    PELVIC EXAM: VULVA: normal appearing vulva with no masses, tenderness or lesions, VAGINA: normal appearing vagina with normal color and discharge, no lesions, CERVIX: normal appearing cervix without discharge or lesions, UTERUS: uterus is normal size, shape, consistency and nontender, ADNEXA: unable to palpate, exam limited due to body habitus    Diabetic Foot Exam:   Left:   Proprioception normal   Filament test: normal sensation with micro filament   Pulse DP: 2+ (normal)   Pulse PT: 2+ (normal)   Deformities: None  Right:    Proprioception normal   Filament test: normal sensation with micro filament   Pulse DP: 2+ (normal)   Pulse PT: 2+ (normal)   Deformities: None    Assessment/Plan:   Kasia Denise is a 46 y.o. female seen today for:     1.  Encounter for well woman exam with routine gynecological exam: healthy, Pap with HPV co-testing today. - PAP IG, APTIMA HPV AND RFX 16/18,45 ([id number]); Future  - Continue with work on diet and exercise    2. Screening for malignant neoplasm of cervix  - PAP IG, APTIMA HPV AND RFX 16/18,45 ([id number]); Future    3. Controlled type 2 diabetes mellitus without complication, without long-term current use of insulin (HCC)  - HEMOGLOBIN A1C WITH EAG; Future  - METABOLIC PANEL, COMPREHENSIVE; Future  - LIPID PANEL; Future  - MICROALBUMIN, UR, RAND W/ MICROALB/CREAT RATIO; Future  - HM DIABETES FOOT EXAM  - pneumococcal 23-valent (PNEUMOVAX 23) 25 mcg/0.5 mL injection; 0.5 mL by IntraMUSCular route once for 1 dose. Dispense: 0.5 mL; Refill: 0    4. Vitamin D deficiency  - VITAMIN D, 25 HYDROXY; Future    5. Encounter for immunization: encouraged to have vaccines administered at her local pharmacy. - diph,Pertuss,Acell,,Tet Vac-PF (ADACEL) 2 Lf-(2.5-5-3-5 mcg)-5Lf/0.5 mL susp; 0.5 mL by IntraMUSCular route once for 1 dose. Dispense: 1 Syringe; Refill: 0  - pneumococcal 23-valent (PNEUMOVAX 23) 25 mcg/0.5 mL injection; 0.5 mL by IntraMUSCular route once for 1 dose.   Dispense: 0.5 mL; Refill: 0

## 2020-06-13 LAB — 25(OH)D3 SERPL-MCNC: 54 NG/ML (ref 30–100)

## 2020-06-15 ENCOUNTER — TELEPHONE (OUTPATIENT)
Dept: FAMILY MEDICINE CLINIC | Age: 40
End: 2020-06-15

## 2020-06-15 NOTE — TELEPHONE ENCOUNTER
----- Message from Sherry Barriga sent at 6/15/2020  3:06 PM EDT -----  Regarding: Dr. Brianna Epps  Patient states that the pharmacy cannot administer her Pneumonia or Tetanus shot, it has to be administered by Drs. Office per the insurance. Pls contact pt at [phone]. Pt would also like to have her lab results.

## 2020-06-17 NOTE — PROGRESS NOTES
CBC, CMP, vitamin D normal/stable. Diabetes well controlled. Microalubmin/creatinine ratio elevated - already on ACE-I therapy, plan to recheck in 6 months.

## 2020-06-18 ENCOUNTER — OFFICE VISIT (OUTPATIENT)
Dept: FAMILY MEDICINE CLINIC | Age: 40
End: 2020-06-18

## 2020-06-18 DIAGNOSIS — Z23 ENCOUNTER FOR IMMUNIZATION: ICD-10-CM

## 2020-06-18 DIAGNOSIS — E11.9 CONTROLLED TYPE 2 DIABETES MELLITUS WITHOUT COMPLICATION, WITHOUT LONG-TERM CURRENT USE OF INSULIN (HCC): Primary | ICD-10-CM

## 2020-06-18 NOTE — PATIENT INSTRUCTIONS
Tdap (Tetanus, Diphtheria, Pertussis) Vaccine: What You Need to Know Why get vaccinated? Tdap vaccine can prevent tetanus, diphtheria, and pertussis. Diphtheria and pertussis spread from person to person. Tetanus enters the body through cuts or wounds. · TETANUS (T) causes painful stiffening of the muscles. Tetanus can lead to serious health problems, including being unable to open the mouth, having trouble swallowing and breathing, or death. · DIPHTHERIA (D) can lead to difficulty breathing, heart failure, paralysis, or death. · PERTUSSIS (aP), also known as \"whooping cough,\" can cause uncontrollable, violent coughing which makes it hard to breathe, eat, or drink. Pertussis can be extremely serious in babies and young children, causing pneumonia, convulsions, brain damage, or death. In teens and adults, it can cause weight loss, loss of bladder control, passing out, and rib fractures from severe coughing. Tdap vaccine Tdap is only for children 7 years and older, adolescents, and adults. Adolescents should receive a single dose of Tdap, preferably at age 6 or 15 years. Pregnant women should get a dose of Tdap during every pregnancy, to protect the  from pertussis. Infants are most at risk for severe, life threatening complications from pertussis. Adults who have never received Tdap should get a dose of Tdap. Also, adults should receive a booster dose every 10 years, or earlier in the case of a severe and dirty wound or burn. Booster doses can be either Tdap or Td (a different vaccine that protects against tetanus and diphtheria but not pertussis). Tdap may be given at the same time as other vaccines. Talk with your health care provider Tell your vaccine provider if the person getting the vaccine: 
· Has had an allergic reaction after a previous dose of any vaccine that protects against tetanus, diphtheria, or pertussis, or has any severe, life threatening allergies. · Has had a coma, decreased level of consciousness, or prolonged seizures within 7 days after a previous dose of any pertussis vaccine (DTP, DTaP, or Tdap). · Has seizures or another nervous system problem. · Has ever had Guillain-Barré Syndrome (also called GBS). · Has had severe pain or swelling after a previous dose of any vaccine that protects against tetanus or diphtheria. In some cases, your health care provider may decide to postpone Tdap vaccination to a future visit. People with minor illnesses, such as a cold, may be vaccinated. People who are moderately or severely ill should usually wait until they recover before getting Tdap vaccine. Your health care provider can give you more information. Risks of a vaccine reaction · Pain, redness, or swelling where the shot was given, mild fever, headache, feeling tired, and nausea, vomiting, diarrhea, or stomachache sometimes happen after Tdap vaccine. People sometimes faint after medical procedures, including vaccination. Tell your provider if you feel dizzy or have vision changes or ringing in the ears. As with any medicine, there is a very remote chance of a vaccine causing a severe allergic reaction, other serious injury, or death. What if there is a serious problem? An allergic reaction could occur after the vaccinated person leaves the clinic. If you see signs of a severe allergic reaction (hives, swelling of the face and throat, difficulty breathing, a fast heartbeat, dizziness, or weakness), call 9-1-1 and get the person to the nearest hospital. 
For other signs that concern you, call your health care provider. Adverse reactions should be reported to the Vaccine Adverse Event Reporting System (VAERS). Your health care provider will usually file this report, or you can do it yourself. Visit the VAERS website at www.vaers. hhs.gov or call 8-770.432.9150. VAERS is only for reporting reactions, and VAERS staff do not give medical advice. The National Vaccine Injury Compensation Program 
The National Vaccine Injury Compensation Program (VICP) is a federal program that was created to compensate people who may have been injured by certain vaccines. Visit the VICP website at www.hrsa.gov/vaccinecompensation or call 5-620.233.9683 to learn about the program and about filing a claim. There is a time limit to file a claim for compensation. How can I learn more? · Ask your health care provider. · Call your local or state health department. · Contact the Centers for Disease Control and Prevention (CDC): 
? Call 6-745.608.8323 (1-800-CDC-INFO) or 
? Visit CDC's website at www.cdc.gov/vaccines Vaccine Information Statement (Interim) Tdap (Tetanus, Diphtheria, Pertussis) Vaccine 04/01/2020 
42 FRANCINE Tran Fredis [id number] [facility] and [employer] Centers for Disease Control and Prevention Many Vaccine Information Statements are available in [place] and other languages. See www.immunize.org/vis. Muchas hojas de información sobre vacunas están disponibles en español y en otros idiomas. Visite www.immunize.org/vis. Care instructions adapted under license by [employer] (which disclaims liability or warranty for this information). If you have questions about a medical condition or this instruction, always ask your healthcare professional. [address] any warranty or liability for your use of this information. Pneumococcal Polysaccharide Vaccine: What You Need to Know Why get vaccinated? Pneumococcal polysaccharide vaccine (PPSV23) can prevent pneumococcal disease. Pneumococcal disease refers to any illness caused by pneumococcal bacteria. These bacteria can cause many types of illnesses, including pneumonia, which is an infection of the lungs. Pneumococcal bacteria are one of the most common causes of pneumonia.  
Besides pneumonia, pneumococcal bacteria can also cause: 
· Ear infections, 
 · Sinus infections · Meningitis (infection of the tissue covering the brain and spinal cord) · Bacteremia (bloodstream infection) Anyone can get pneumococcal disease, but children under 3years of age, people with certain medical conditions, adults 72 years or older, and cigarette smokers are at the highest risk. Most pneumococcal infections are mild. However, some can result in long-term problems, such as brain damage or hearing loss. Meningitis, bacteremia, and pneumonia caused by pneumococcal disease can be fatal. 
PPSV23 
PPSV23 protects against 23 types of bacteria that cause pneumococcal disease. PPSV23 is recommended for: · All adults 72 years or older, · Anyone 2 years or older with certain medical conditions that can lead to an increased risk for pneumococcal disease. Most people need only one dose of PPSV23. A second dose of PPSV23, and another type of pneumococcal vaccine called PCV13, are recommended for certain high-risk groups. Your health care provider can give you more information. People 65 years or older should get a dose of PPSV23 even if they have already gotten one or more doses of the vaccine before they turned 65. Talk with your health care provider Tell your vaccine provider if the person getting the vaccine: 
· Has had an allergic reaction after a previous dose of PPSV23, or has any severe, life-threatening allergies. In some cases, your health care provider may decide to postpone PPSV23 vaccination to a future visit. People with minor illnesses, such as a cold, may be vaccinated. People who are moderately or severely ill should usually wait until they recover before getting PPSV23. Your health care provider can give you more information. Risks of a vaccine reaction · Redness or pain where the shot is given, feeling tired, fever, or muscle aches can happen after PPSV23. People sometimes faint after medical procedures, including vaccination. Tell your provider if you feel dizzy or have vision changes or ringing in the ears. As with any medicine, there is a very remote chance of a vaccine causing a severe allergic reaction, other serious injury, or death. What if there is a serious problem? An allergic reaction could occur after the vaccinated person leaves the clinic. If you see signs of a severe allergic reaction (hives, swelling of the face and throat, difficulty breathing, a fast heartbeat, dizziness, or weakness), call 9-1-1 and get the person to the nearest hospital. 
For other signs that concern you, call your health care provider. Adverse reactions should be reported to the Vaccine Adverse Event Reporting System (VAERS). Your health care provider will usually file this report, or you can do it yourself. Visit the VAERS website at www.vaers. hhs.gov at www.vaers. hhs.gov or call 2-618.621.7080. VAERS is only for reporting reactions, and VAERS staff do not give medical advice. How can I learn more? · Ask your health care provider. · Call your local or state health department. · Contact the Centers for Disease Control and Prevention (CDC): 
? Call 9-354.785.7292 (1-800-CDC-INFO) or 
? Visit CDC's website at www.cdc.gov/vaccines Vaccine Information Statement PPSV23 Vaccine 10/30/2019 Department of [facility] and [employer] Centers for Disease Control and Prevention Many Vaccine Information Statements are available in Citizen of [place] and other languages. See www.immunize.org/vis. Hojas de información Sobre Vacunas están disponibles en español y en muchos otros idiomas. Visite Oscar.si. Care instructions adapted under license by [employer] (which disclaims liability or warranty for this information). If you have questions about a medical condition or this instruction, always ask your healthcare professional. [address] any warranty or liability for your use of this information.

## 2020-06-18 NOTE — PROGRESS NOTES
Identified pt with two pt identifiers(name and ). No chief complaint on file. Health Maintenance Due   Topic    Pneumococcal 0-64 years (1 of 1 - PPSV23)    DTaP/Tdap/Td series (1 - Tdap)       Wt Readings from Last 3 Encounters:   20 266 lb 6.4 oz (120.8 kg)   20 267 lb (121.1 kg)   20 267 lb (121.1 kg)     Temp Readings from Last 3 Encounters:   20 98.6 °F (37 °C) (Oral)   20 98.6 °F (37 °C)   20 98.6 °F (37 °C) (Oral)     BP Readings from Last 3 Encounters:   20 122/80   20 128/70   20 (!) 143/92     Pulse Readings from Last 3 Encounters:   20 87   20 67   20 70         Learning Assessment:  :     Learning Assessment 2019   PRIMARY LEARNER Patient   PRIMARY LANGUAGE ENGLISH   LEARNER PREFERENCE PRIMARY DEMONSTRATION   ANSWERED BY self   RELATIONSHIP SELF       Depression Screening:  :     3 most recent PHQ Screens 2020   Little interest or pleasure in doing things Not at all   Feeling down, depressed, irritable, or hopeless Not at all   Total Score PHQ 2 0       Fall Risk Assessment:  :     No flowsheet data found. Abuse Screening:  :     Abuse Screening Questionnaire 2019   Do you ever feel afraid of your partner? N N   Are you in a relationship with someone who physically or mentally threatens you? N N   Is it safe for you to go home? Y Y       Coordination of Care Questionnaire:  :     1) Have you been to an emergency room, urgent care clinic since your last visit? no   Hospitalized since your last visit? no             2) Have you seen or consulted any other health care providers outside of [address] [place] [address] since your last visit? no  (Include any pap smears or colon screenings in this section.)    3) Do you have an Advance Directive on file? no  Are you interested in receiving information about Advance Directives? no     Administered Tdap and PPSV23  Vaccines.  Pt tolerated well.    Reviewed record in preparation for visit and have obtained necessary documentation.

## 2020-06-23 LAB
CYTOLOGIST CVX/VAG CYTO: NORMAL
CYTOLOGY CVX/VAG DOC CYTO: NORMAL
CYTOLOGY CVX/VAG DOC THIN PREP: NORMAL
DX ICD CODE: NORMAL
HPV I/H RISK 4 DNA CVX QL PROBE+SIG AMP: NEGATIVE
Lab: NORMAL
OTHER STN SPEC: NORMAL
STAT OF ADQ CVX/VAG CYTO-IMP: NORMAL

## 2020-06-26 ENCOUNTER — TELEPHONE (OUTPATIENT)
Dept: FAMILY MEDICINE CLINIC | Age: 40
End: 2020-06-26

## 2020-06-26 RX ORDER — ALBUTEROL SULFATE 90 UG/1
2 AEROSOL, METERED RESPIRATORY (INHALATION)
Qty: 1 INHALER | Refills: 2 | Status: SHIPPED | OUTPATIENT
Start: 2020-06-26 | End: 2021-09-01 | Stop reason: SDUPTHER

## 2020-06-26 NOTE — TELEPHONE ENCOUNTER
Requested Prescriptions     Signed Prescriptions Disp Refills    albuterol (PROVENTIL HFA, VENTOLIN HFA, PROAIR HFA) 90 mcg/actuation inhaler 1 Inhaler 2     Sig: Take 2 Puffs by inhalation every four (4) hours as needed for Wheezing.      Authorizing Provider: Wanda Robins

## 2020-06-29 NOTE — PROGRESS NOTES
Subjective:      Sourav Alejandro is a 46 y.o. female here today for tetanus and pneumococcal vaccines. Reports that she is doing well. No additional concerns at this time. Diabetes mellitus:   · Medication compliance: compliant all of the time,   · Diabetic diet compliance: compliant most of the time,   · Home glucose monitoring: is performed regularly,   · Due for pneumococcal vaccine  Lab Results   Component Value Date/Time    Hemoglobin A1c 5.5 06/12/2020 11:41 AM    Hemoglobin A1c 5.6 01/16/2020 04:09 PM    Hemoglobin A1c 5.4 10/17/2015 07:10 AM    Glucose 89 06/12/2020 11:41 AM    Glucose (POC) 93 10/17/2015 11:19 AM    Microalbumin/Creat ratio (mg/g creat) 94 (H) 06/12/2020 11:41 AM    Microalbumin,urine random 8.06 06/12/2020 11:41 AM    LDL, calculated 65.8 06/12/2020 11:41 AM    Creatinine 0.65 06/12/2020 11:41 AM         Current Outpatient Medications   Medication Sig Dispense Refill    albuterol (PROVENTIL HFA, VENTOLIN HFA, PROAIR HFA) 90 mcg/actuation inhaler Take 2 Puffs by inhalation every four (4) hours as needed for Wheezing. 1 Inhaler 2    cetirizine (ZyrTEC) 10 mg tablet Take 10 mg by mouth daily.  ergocalciferol (Vitamin D2) 1,250 mcg (50,000 unit) capsule Take 1 Cap by mouth every seven (7) days. 12 Cap 0    triamcinolone acetonide (KENALOG) 0.1 % topical cream APPLY TO AFFECTED AREA TWO (2) TIMES DAILY AS NEEDED FOR SKIN IRRITATION OR ITCHING. 30 g 2    glucose blood VI test strips (PRODIGY NO CODING) strip Check BG daily as directed. 50 Strip 11    atorvastatin (LIPITOR) 40 mg tablet Take 1 Tab by mouth daily. 3    lisinopril-hydroCHLOROthiazide (PRINZIDE, ZESTORETIC) 20-25 mg per tablet Take 1 Tab by mouth daily. 3    metFORMIN (GLUCOPHAGE) 500 mg tablet Take 500 mg by mouth daily.  aspirin delayed-release 81 mg tablet Take 81 mg by mouth daily.          Allergies   Allergen Reactions    Hydrocodone Hives       Past Medical History:   Diagnosis Date    Diabetes ([address] 04/2019    [facility]     Hypertension     Ill-defined condition     PE    Panic attack     Pneumonia     Psychiatric disorder     Pulmonary embolism ([facility] 2015    treated with oral antiocoagulants for 12 months    Severe obstructive sleep apnea 03/2020    Urticaria        Social History     Tobacco Use    Smoking status: Former Smoker     Packs/day: 0.25    Smokeless tobacco: Never Used   Substance Use Topics    Alcohol use: No     Comment: rarely        Review of Systems  Constitutional: negative for fevers and chills  Ears, nose, mouth, throat, and face: negative for nasal congestion and sore throat    Objective:     Visit Vitals  LMP 06/06/2020 (Exact Date)      General appearance - alert, well appearing, and in no distress  Chest - no tachypnea, retractions or cyanosis    Assessment/Plan:   Héctor Matthew is a 46 y.o. female seen for:     1. Controlled type 2 diabetes mellitus without complication, without long-term current use of insulin ([facility]): controlled. - PNEUMOCOCCAL POLYSACCHARIDE VACCINE, 23-VALENT, ADULT OR IMMUNOSUPPRESSED PT DOSE,    2. Encounter for immunization  - TETANUS, DIPHTHERIA TOXOIDS AND ACELLULAR PERTUSSIS VACCINE (TDAP), IN INDIVIDS. >=7, IM  - PNEUMOCOCCAL POLYSACCHARIDE VACCINE, 23-VALENT, ADULT OR IMMUNOSUPPRESSED PT DOSE,  - NM IMMUNIZ ADMIN,1 SINGLE/COMB VAC/TOXOID  - NM IMMUNIZ,ADMIN,EACH ADDL    I have discussed the diagnosis with the patient and the intended plan as seen in the above orders. The patient has received an after-visit summary and questions were answered concerning future plans. I have discussed medication side effects and warnings with the patient as well. Patient verbalizes understanding of plan of care and denies further questions or concerns at this time. Informed patient to return to the office if symptoms worsen or if new symptoms arise.

## 2020-07-01 ENCOUNTER — TELEPHONE (OUTPATIENT)
Dept: FAMILY MEDICINE CLINIC | Age: 40
End: 2020-07-01

## 2020-07-01 NOTE — TELEPHONE ENCOUNTER
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 She is wanting to know if there is anything she should do/.

## 2020-07-02 NOTE — TELEPHONE ENCOUNTER
Called patient with no answer. LVM message advising that I have sent response via [employer].

## 2020-08-17 ENCOUNTER — VIRTUAL VISIT (OUTPATIENT)
Dept: FAMILY MEDICINE CLINIC | Age: 40
End: 2020-08-17
Payer: MEDICAID

## 2020-08-17 DIAGNOSIS — R51.9 ACUTE INTRACTABLE HEADACHE, UNSPECIFIED HEADACHE TYPE: ICD-10-CM

## 2020-08-17 DIAGNOSIS — J01.90 ACUTE RHINOSINUSITIS: Primary | ICD-10-CM

## 2020-08-17 PROCEDURE — 99213 OFFICE O/P EST LOW 20 MIN: CPT | Performed by: FAMILY MEDICINE

## 2020-08-17 RX ORDER — AMOXICILLIN AND CLAVULANATE POTASSIUM 875; 125 MG/1; MG/1
1 TABLET, FILM COATED ORAL EVERY 12 HOURS
Qty: 14 TAB | Refills: 0 | Status: SHIPPED | OUTPATIENT
Start: 2020-08-17 | End: 2020-08-24

## 2020-08-17 NOTE — PROGRESS NOTES
Cem Michaud is a 46 y.o. female who was seen by synchronous (real-time) audio-video technology on 8/17/2020 for Headache        Assessment & Plan:   Diagnoses and all orders for this visit:    1. Acute rhinosinusitis  -     amoxicillin-clavulanate (AUGMENTIN) 875-125 mg per tablet; Take 1 Tab by mouth every twelve (12) hours for 7 days. - Warm compresses to sinuses, continue with naproxen   - Recommend reevaluation if symptoms worsen or fail to improve as expected     2. Acute intractable headache, unspecified headache type: no alarming history at this time. Signs/symptoms that would warrant emergent evaluation discussed. 712  Subjective:   Endorses headache, sore throat which started about 4 days ago. Pain is located at the back of the head and radiates into the jaw and ear. Pain has been dull and pressure like with intermittent throbbing. Associated with dizziness yesterday and mild nausea, sinus congestion. Denies UE weakness or paresthesias, neck stiffness, fever. He daughter has recently been ill. BP has been controlled at home. Evaluation to date: none  Treatment to date: Tylenol every 6 hours, aspirin, naproxen which did ease her headache    Prior to Admission medications    Medication Sig Start Date End Date Taking? Authorizing Provider   albuterol (PROVENTIL HFA, VENTOLIN HFA, PROAIR HFA) 90 mcg/actuation inhaler Take 2 Puffs by inhalation every four (4) hours as needed for Wheezing. 6/26/20  Yes Evangelist Horn MD   cetirizine (ZyrTEC) 10 mg tablet Take 10 mg by mouth daily. Yes Provider, Historical   ergocalciferol (Vitamin D2) 1,250 mcg (50,000 unit) capsule Take 1 Cap by mouth every seven (7) days. 4/8/20  Yes Evangelist Horn MD   triamcinolone acetonide (KENALOG) 0.1 % topical cream APPLY TO AFFECTED AREA TWO (2) TIMES DAILY AS NEEDED FOR SKIN IRRITATION OR ITCHING. 4/3/20  Yes Evangelist Horn MD   glucose blood VI test strips (PRODIGY NO CODING) strip Check BG daily as directed. 1/7/20  Yes Barabara Dance, MD   atorvastatin (LIPITOR) 40 mg tablet Take 1 Tab by mouth daily. 9/17/19  Yes Provider, Historical   lisinopril-hydroCHLOROthiazide (PRINZIDE, ZESTORETIC) 20-25 mg per tablet Take 1 Tab by mouth daily. 11/4/19  Yes Provider, Historical   metFORMIN (GLUCOPHAGE) 500 mg tablet Take 500 mg by mouth daily. Yes Other, MD Bertha   aspirin delayed-release 81 mg tablet Take 81 mg by mouth daily. Yes Other, MD Bertha     Allergies   Allergen Reactions    Hydrocodone Hives     Past Medical History:   Diagnosis Date    Diabetes ([facility] 04/2019    Diabetes type 2, controlled ([facility]     Hypertension     Ill-defined condition     PE    Panic attack     Pneumonia     Psychiatric disorder     Pulmonary embolism ([facility] 2015    treated with oral antiocoagulants for 12 months    Severe obstructive sleep apnea 03/2020    Urticaria      Social History     Tobacco Use    Smoking status: Former Smoker     Packs/day: 0.25    Smokeless tobacco: Never Used   Substance Use Topics    Alcohol use: No     Comment: rarely       ROS  Pertinent items noted in HPI    Objective:   No flowsheet data found. General: alert, cooperative, no distress   Mental  status: normal mood, behavior, speech, dress, motor activity, and thought processes, able to follow commands   HENT: NCAT   Neck: no visualized mass   Resp: no respiratory distress   Neuro: no gross deficits   Skin: no discoloration or lesions of concern on visible areas   Psychiatric: normal affect, consistent with stated mood, no evidence of hallucinations       We discussed the expected course, resolution and complications of the diagnosis(es) in detail. Medication risks, benefits, costs, interactions, and alternatives were discussed as indicated. I advised her to contact the office if her condition worsens, changes or fails to improve as anticipated. She expressed understanding with the diagnosis(es) and plan.        Jess Rajput, who was evaluated through a patient-initiated, synchronous (real-time) audio-video encounter, and/or her healthcare decision maker, is aware that it is a billable service, with coverage as determined by her insurance carrier. She provided verbal consent to proceed: Yes, and patient identification was verified. It was conducted pursuant to the emergency declaration under the [address] [place] [address] and the [email or web address] General Act. A caregiver was present when appropriate. Ability to conduct physical exam was limited. I was in the office. The patient was at home.       Hermann Gurrola MD

## 2020-08-20 ENCOUNTER — TELEPHONE (OUTPATIENT)
Dept: FAMILY MEDICINE CLINIC | Age: 40
End: 2020-08-20

## 2020-08-20 NOTE — TELEPHONE ENCOUNTER
Caller's first and last name and relationship (if not the patient): pt   Best contact number(s): [phone]   What are the symptoms: dizziness and low grade fever   Transfer successful - yes/no (include outcome): no   Transfer declined - yes/no (include reason): yes (nurse is going to call pt back)   Was caller advised to seek appropriate level of care - yes/no: yes   Details to clarify the request: Pt was started on an antibiotic prescribed by Dr. Gerda Sim at her last visit, however, she is still experiencing dizziness and low grade fever.

## 2020-09-20 ENCOUNTER — APPOINTMENT (OUTPATIENT)
Dept: ULTRASOUND IMAGING | Age: 40
End: 2020-09-20
Attending: EMERGENCY MEDICINE
Payer: MEDICAID

## 2020-09-20 ENCOUNTER — HOSPITAL ENCOUNTER (EMERGENCY)
Age: 40
Discharge: HOME | End: 2020-09-20
Attending: EMERGENCY MEDICINE
Payer: MEDICAID

## 2020-09-20 VITALS
RESPIRATION RATE: 16 BRPM | OXYGEN SATURATION: 98 % | HEART RATE: 76 BPM | WEIGHT: 268.3 LBS | SYSTOLIC BLOOD PRESSURE: 135 MMHG | TEMPERATURE: 98.1 F | HEIGHT: 63 IN | DIASTOLIC BLOOD PRESSURE: 65 MMHG | BODY MASS INDEX: 47.54 KG/M2

## 2020-09-20 DIAGNOSIS — M79.89 SWELLING OF RIGHT HAND: ICD-10-CM

## 2020-09-20 DIAGNOSIS — L03.113 CELLULITIS OF RIGHT HAND: Primary | ICD-10-CM

## 2020-09-20 LAB
ALBUMIN SERPL-MCNC: 3.4 G/DL (ref 3.5–5)
ALBUMIN/GLOB SERPL: 0.9 {RATIO} (ref 1.1–2.2)
ALP SERPL-CCNC: 57 U/L (ref 45–117)
ALT SERPL-CCNC: 20 U/L (ref 12–78)
ANION GAP SERPL CALC-SCNC: 6 MMOL/L (ref 5–15)
AST SERPL-CCNC: 11 U/L (ref 15–37)
BASOPHILS # BLD: 0 K/UL (ref 0–0.1)
BASOPHILS NFR BLD: 0 % (ref 0–1)
BILIRUB SERPL-MCNC: 0.8 MG/DL (ref 0.2–1)
BUN SERPL-MCNC: 17 MG/DL (ref 6–20)
BUN/CREAT SERPL: 22 (ref 12–20)
CALCIUM SERPL-MCNC: 8.7 MG/DL (ref 8.5–10.1)
CHLORIDE SERPL-SCNC: 103 MMOL/L (ref 97–108)
CO2 SERPL-SCNC: 30 MMOL/L (ref 21–32)
CREAT SERPL-MCNC: 0.78 MG/DL (ref 0.55–1.02)
CRP SERPL-MCNC: 2.55 MG/DL (ref 0–0.6)
DIFFERENTIAL METHOD BLD: ABNORMAL
EOSINOPHIL # BLD: 0.1 K/UL (ref 0–0.4)
EOSINOPHIL NFR BLD: 1 % (ref 0–7)
ERYTHROCYTE [DISTWIDTH] IN BLOOD BY AUTOMATED COUNT: 14.8 % (ref 11.5–14.5)
ERYTHROCYTE [SEDIMENTATION RATE] IN BLOOD: 20 MM/HR (ref 0–20)
GLOBULIN SER CALC-MCNC: 3.9 G/DL (ref 2–4)
GLUCOSE SERPL-MCNC: 96 MG/DL (ref 65–100)
HCT VFR BLD AUTO: 41.7 % (ref 35–47)
HGB BLD-MCNC: 12.5 G/DL (ref 11.5–16)
IMM GRANULOCYTES # BLD AUTO: 0 K/UL (ref 0–0.04)
IMM GRANULOCYTES NFR BLD AUTO: 0 % (ref 0–0.5)
LYMPHOCYTES # BLD: 1.5 K/UL (ref 0.8–3.5)
LYMPHOCYTES NFR BLD: 19 % (ref 12–49)
MCH RBC QN AUTO: 24.4 PG (ref 26–34)
MCHC RBC AUTO-ENTMCNC: 30 G/DL (ref 30–36.5)
MCV RBC AUTO: 81.4 FL (ref 80–99)
MONOCYTES # BLD: 0.4 K/UL (ref 0–1)
MONOCYTES NFR BLD: 5 % (ref 5–13)
NEUTS SEG # BLD: 5.7 K/UL (ref 1.8–8)
NEUTS SEG NFR BLD: 74 % (ref 32–75)
NRBC # BLD: 0 K/UL (ref 0–0.01)
NRBC BLD-RTO: 0 PER 100 WBC
PLATELET # BLD AUTO: 265 K/UL (ref 150–400)
PMV BLD AUTO: 9.6 FL (ref 8.9–12.9)
POTASSIUM SERPL-SCNC: 3.8 MMOL/L (ref 3.5–5.1)
PROT SERPL-MCNC: 7.3 G/DL (ref 6.4–8.2)
RBC # BLD AUTO: 5.12 M/UL (ref 3.8–5.2)
SODIUM SERPL-SCNC: 139 MMOL/L (ref 136–145)
WBC # BLD AUTO: 7.7 K/UL (ref 3.6–11)

## 2020-09-20 PROCEDURE — 99283 EMERGENCY DEPT VISIT LOW MDM: CPT

## 2020-09-20 PROCEDURE — 85025 COMPLETE CBC W/AUTO DIFF WBC: CPT

## 2020-09-20 PROCEDURE — 74011250637 HC RX REV CODE- 250/637: Performed by: EMERGENCY MEDICINE

## 2020-09-20 PROCEDURE — 80053 COMPREHEN METABOLIC PANEL: CPT

## 2020-09-20 PROCEDURE — 85652 RBC SED RATE AUTOMATED: CPT

## 2020-09-20 PROCEDURE — 86140 C-REACTIVE PROTEIN: CPT

## 2020-09-20 PROCEDURE — 93971 EXTREMITY STUDY: CPT

## 2020-09-20 PROCEDURE — 36415 COLL VENOUS BLD VENIPUNCTURE: CPT

## 2020-09-20 RX ORDER — CLINDAMYCIN HYDROCHLORIDE 150 MG/1
300 CAPSULE ORAL
Status: COMPLETED | OUTPATIENT
Start: 2020-09-20 | End: 2020-09-20

## 2020-09-20 RX ORDER — FAMOTIDINE 20 MG/1
20 TABLET, FILM COATED ORAL 2 TIMES DAILY
COMMUNITY
End: 2020-11-04

## 2020-09-20 RX ORDER — CLINDAMYCIN HYDROCHLORIDE 300 MG/1
300 CAPSULE ORAL 3 TIMES DAILY
Qty: 20 CAP | Refills: 0 | Status: SHIPPED | OUTPATIENT
Start: 2020-09-20 | End: 2020-09-27

## 2020-09-20 RX ORDER — IBUPROFEN 200 MG
400 TABLET ORAL
COMMUNITY
End: 2020-09-29

## 2020-09-20 RX ADMIN — CLINDAMYCIN HYDROCHLORIDE 300 MG: 150 CAPSULE ORAL at 13:27

## 2020-09-20 NOTE — ED TRIAGE NOTES
Pt ambulates to treatment area she states that randomly her right hand began to swell she also felt a small knot in the palm. She went to [facility] and they did an xray and blood work all was fine. This morning she woke up and the hand is much larger and the swelling is going up the arm.   Denies any insect bite to the hand denies any injury

## 2020-09-20 NOTE — ED PROVIDER NOTES
81-year-old female with a history of obesity, DM, HTN, prior PE, not currently on anticoagulant therapy, anxiety, urticaria, presents to the emergency department noting a 2-day history of progressively worsening right hand redness and swelling. Yesterday she reportedly noted some small area of redness and swelling to the lower palm of her right hand and reportedly was evaluated at [place] [facility] with reportedly unremarkable labs and negative hand x-ray. She was discharged and upon waking this morning noted significant worsening of her right hand swelling and redness and increased tenderness which she describes as feeling of a tightness. She denies any trauma to the area, fever, chills, numbness, weakness. She notes mildly decreased range of motion due to the swelling but is still able to make a fist most of the way. She has not taken anything for her symptoms. No history of prior skin infections or prior hand injuries. Denies any swelling or rashes anywhere else, no tongue swelling, shortness of breath, urticaria, leg swelling, chest pain, shortness of breath. Hand Pain    Pertinent negatives include no numbness, no back pain and no neck pain.         Past Medical History:   Diagnosis Date    Diabetes ([facility] 04/2019    Diabetes type 2, controlled ([facility]     Hypertension     Ill-defined condition     PE    Panic attack     Pneumonia     Psychiatric disorder     Pulmonary embolism ([facility] 2015    treated with oral antiocoagulants for 12 months    Severe obstructive sleep apnea 03/2020    Urticaria        Past Surgical History:   Procedure Laterality Date    HX COLONOSCOPY      HX DILATION AND CURETTAGE           Family History:   Problem Relation Age of Onset    Stroke Other     Hypertension Mother     No Known Problems Father        Social History     Socioeconomic History    Marital status: SINGLE     Spouse name: Not on file    Number of children: Not on file    Years of education: Not on file    Highest education level: Not on file   Occupational History    Not on file   Social Needs    Financial resource strain: Not on file    Food insecurity     Worry: Not on file     Inability: Not on file    Transportation needs     Medical: Not on file     Non-medical: Not on file   Tobacco Use    Smoking status: Former Smoker     Packs/day: 0.25    Smokeless tobacco: Never Used   Substance and Sexual Activity    Alcohol use: No     Comment: rarely    Drug use: No    Sexual activity: Not on file   Lifestyle    Physical activity     Days per week: Not on file     Minutes per session: Not on file    Stress: Not on file   Relationships    Social connections     Talks on phone: Not on file     Gets together: Not on file     Attends Jewish service: Not on file     Active member of club or organization: Not on file     Attends meetings of clubs or organizations: Not on file     Relationship status: Not on file    Intimate partner violence     Fear of current or ex partner: Not on file     Emotionally abused: Not on file     Physically abused: Not on file     Forced sexual activity: Not on file   Other Topics Concern    Not on file   Social History Narrative    Not on file         ALLERGIES: Hydrocodone    Review of Systems   Constitutional: Negative for activity change, appetite change, chills and fever. HENT: Negative for congestion, rhinorrhea, sinus pressure, sneezing and sore throat. Eyes: Negative for photophobia and visual disturbance. Respiratory: Negative for cough and shortness of breath. Cardiovascular: Negative for chest pain. Gastrointestinal: Negative for abdominal pain, blood in stool, constipation, diarrhea, nausea and vomiting. Genitourinary: Negative for difficulty urinating, dysuria, flank pain, frequency, hematuria, menstrual problem, urgency, vaginal bleeding and vaginal discharge.    Musculoskeletal: Positive for arthralgias (Right hand) and joint swelling (Right hand). Negative for back pain, myalgias and neck pain. Skin: Positive for rash. Negative for wound. Neurological: Negative for syncope, weakness, numbness and headaches. Psychiatric/Behavioral: Negative for self-injury and suicidal ideas. All other systems reviewed and are negative. Vitals:    09/20/20 1046   BP: 135/65   Pulse: 76   Resp: 16   Temp: 98.1 °F (36.7 °C)   SpO2: 98%   Weight: 121.7 kg (268 lb 4.8 oz)   Height: 5' 3\" (1.6 m)            Physical Exam  Vitals signs and nursing note reviewed. Constitutional:       General: She is not in acute distress. Appearance: Normal appearance. She is well-developed. She is obese. She is not diaphoretic. HENT:      Head: Normocephalic and atraumatic. Nose: Nose normal.   Eyes:      Extraocular Movements: Extraocular movements intact. Conjunctiva/sclera: Conjunctivae normal.      Pupils: Pupils are equal, round, and reactive to light. Neck:      Musculoskeletal: Neck supple. Cardiovascular:      Rate and Rhythm: Normal rate and regular rhythm. Heart sounds: Normal heart sounds. Pulmonary:      Effort: Pulmonary effort is normal.      Breath sounds: Normal breath sounds. Abdominal:      General: There is no distension. Palpations: Abdomen is soft. Tenderness: There is no abdominal tenderness. Musculoskeletal:      Right hand: She exhibits decreased range of motion (Mild secondary to swelling.), tenderness and swelling. She exhibits no bony tenderness, normal two-point discrimination, normal capillary refill, no deformity and no laceration. Normal sensation noted. Normal strength noted. Comments: See attached clinical photos. Skin:     General: Skin is warm and dry. Neurological:      General: No focal deficit present. Mental Status: She is alert and oriented to person, place, and time. Cranial Nerves: No cranial nerve deficit. Sensory: No sensory deficit. Motor: No weakness. Coordination: Coordination normal.                      MDM   78-year-old female presents with right hand redness and swelling suggestive of cellulitis. Patient is afebrile with vital signs stable. Labs returned showing elevated CRP at 2.55, but otherwise reassuring, no leukocytosis, normal ESR. Upper extremity ultrasound shows no acute upper extremity DVT. Given the severity of her swelling Orthopedics was consulted was discussed with Dr. Kermit Alba and Carolee Duverney, Pa for ortho who recommended starting on po clindamycin and rec'd close f/u with Dr. Taunya Spurling in clinic in the next few days. Feel that the patient is stable for discharge home at this time. Will rx po abx and recommended close orthopedics follow up and keeping her hand elevated as much as possible. Return precautions were given for worsening or concerns.      Procedures

## 2020-09-20 NOTE — ED NOTES
Pt given discharge instructions by Dr Dirk Calixto she verbalizes an understanding pt stable at time of discharge

## 2020-09-22 ENCOUNTER — PATIENT OUTREACH (OUTPATIENT)
Dept: CASE MANAGEMENT | Age: 40
End: 2020-09-22

## 2020-09-22 ENCOUNTER — VIRTUAL VISIT (OUTPATIENT)
Dept: FAMILY MEDICINE CLINIC | Age: 40
End: 2020-09-22
Payer: MEDICAID

## 2020-09-22 ENCOUNTER — TELEPHONE (OUTPATIENT)
Dept: FAMILY MEDICINE CLINIC | Age: 40
End: 2020-09-22

## 2020-09-22 DIAGNOSIS — M79.89 SWELLING OF RIGHT HAND: Primary | ICD-10-CM

## 2020-09-22 PROCEDURE — 99213 OFFICE O/P EST LOW 20 MIN: CPT | Performed by: FAMILY MEDICINE

## 2020-09-22 RX ORDER — MELATONIN
1000 DAILY
COMMUNITY
End: 2020-10-20 | Stop reason: ALTCHOICE

## 2020-09-22 NOTE — PROGRESS NOTES
Daphnie Roper is a 46 y.o. female    Chief Complaint   Patient presents with   [facility] Follow Up     [facility] ER for head pain 9/20/2020       Health Maintenance Due   Topic Date Due    Flu Vaccine (1) 09/01/2020       Visit Vitals  LMP 09/13/2020       3 most recent PHQ Screens 4/17/2020   Little interest or pleasure in doing things Not at all   Feeling down, depressed, irritable, or hopeless Not at all   Total Score PHQ 2 0       Abuse Screening Questionnaire 4/17/2020   Do you ever feel afraid of your partner? N   Are you in a relationship with someone who physically or mentally threatens you? N   Is it safe for you to go home? Y         1. Have you been to the ER, urgent care clinic since your last visit? Hospitalized since your last visit? YES. [facility] ER for head pain 9/20/2020    2. Have you seen or consulted any other health care providers outside of the [address] [place] [address] since your last visit? Include any pap smears or colon screening.  No

## 2020-09-22 NOTE — PROGRESS NOTES
Patient contacted regarding COVID-19  risk. Discussed COVID-19 related testing which was not done at this time. Test results were not done. Patient informed of results, if available? n/a     Care Transition Nurse/ Ambulatory Care Manager/ LPN Care Coordinator contacted the patient by telephone to perform post discharge assessment. Verified name and  with patient as identifiers. Provided introduction to self, and explanation of the CTN/ACM/LPN role, and reason for call due to risk factors for infection and/or exposure to COVID-19. Symptoms reviewed with patient who verbalized the following symptoms: no new symptoms and no worsening symptoms. Due to no new or worsening symptoms encounter was not routed to provider for escalation. Discussed follow-up appointments. If no appointment was previously scheduled, appointment scheduling offered: yes  [facility] follow up appointment(s): No future appointments. Non-[facility] follow up appointment(s): pt states has a virtual follow up with her primary care today, 20 @ 10:15 am.      Advance Care Planning:   Does patient have an Advance Directive: currently not on file; patient declined education    Patient has following risk factors of: diabetes and hypertension. CTN/ACM/LPN reviewed discharge instructions, medical action plan and red flags such as increased shortness of breath, increasing fever and signs of decompensation with patient who verbalized understanding. Discussed exposure protocols and quarantine with CDC Guidelines What to do if you are sick with coronavirus disease .  Patient was given an opportunity for questions and concerns. The patient agrees to contact the [facility] exposure line [phone], [facility] department [facility]  ([phone]) and PCP office for questions related to their healthcare. CTN/ACM provided contact information for future needs.     Reviewed and educated patient on any new and changed medications related to discharge diagnosis. Patient/family/caregiver given information for [employer] and agrees to enroll no  Patient's preferred e-mail:  n/a  Patient's preferred phone number: n/a  Based on Loop alert triggers, patient will be contacted by nurse care manager for worsening symptoms. Plan for follow up call in 14 days based on severity of symptoms and risk factors.

## 2020-09-22 NOTE — TELEPHONE ENCOUNTER
Caller's first and last name: Dada Jacobsen   Reason for call: Requested a call back   Callback required yes/no and why: Yes   Best contact number(s): [phone]   Details to clarify the request: Pt stated she would like a Nurse to call her right away she just recently had a virtual visit with Dr. Brandy Baig about her pervious ED visit with swelling of her hand. Pt stated she now has swelling of her right foot and would like to speak with a nurse ASAP regarding this matter.

## 2020-09-22 NOTE — PROGRESS NOTES
Preston Alvarado is a 46 y.o. female who was seen by synchronous (real-time) audio-video technology on 9/22/2020 for Hospital Follow Up [facility] ER for hand pain 9/20/2020)    Assessment & Plan:   Diagnoses and all orders for this visit:    1. Swelling of right hand: unknown trigger, but has improved. Questionable cellulitis, but no erythema noted. Continue with clindamycin as prescribed. Continue to monitor. Subjective:   Evaluated at [facility] on 9/19/2020 for right hand swelling. ESR obtained. No concern for infection or venous issue and discharged home with recommendation for NSAID therapy, ice, and immobilization. States that symptoms worsened and she went to [facility] on 9/20/2020 for further evaluation. ED workup: normal ESR, elevated CRP at 2.55, normal WBC, UE ultrasound negative for DVT. Case discussed with Orthopedics and she was started on clindamycin with recommendation for follow up with Dr. Dao Culp. States that she still has mild swelling on the back of the hands, but has improved from previous. No erythema, no known insect bite or trauma. No erythema. No known insect bite or trauma. Prior to Admission medications    Medication Sig Start Date End Date Taking? Authorizing Provider   cholecalciferol (VITAMIN D3) (1000 Units /25 mcg) tablet Take 1,000 Units by mouth daily. Yes Provider, Historical   ibuprofen (MOTRIN) 200 mg tablet Take 400 mg by mouth. Yes Other, MD Bertha   famotidine (Pepcid) 20 mg tablet Take 20 mg by mouth two (2) times a day. Yes Other, MD Bertha   clindamycin (CLEOCIN) 300 mg capsule Take 1 Cap by mouth three (3) times daily for 7 days. 9/20/20 9/27/20 Yes Junious Cost, DO   albuterol (PROVENTIL HFA, VENTOLIN HFA, PROAIR HFA) 90 mcg/actuation inhaler Take 2 Puffs by inhalation every four (4) hours as needed for Wheezing. 6/26/20  Yes Skip Gonzales MD   cetirizine (ZyrTEC) 10 mg tablet Take 10 mg by mouth daily.    Yes Provider, Historical   triamcinolone acetonide (KENALOG) 0.1 % topical cream APPLY TO AFFECTED AREA TWO (2) TIMES DAILY AS NEEDED FOR SKIN IRRITATION OR ITCHING. 4/3/20  Yes Sravanthi Nair MD   glucose blood VI test strips (PRODIGY NO CODING) strip Check BG daily as directed. 1/7/20  Yes Sravanthi Nair MD   atorvastatin (LIPITOR) 40 mg tablet Take 1 Tab by mouth daily. 9/17/19  Yes Provider, Historical   lisinopril-hydroCHLOROthiazide (PRINZIDE, ZESTORETIC) 20-25 mg per tablet Take 1 Tab by mouth daily. 11/4/19  Yes Provider, Historical   metFORMIN (GLUCOPHAGE) 500 mg tablet Take 500 mg by mouth daily. Yes Other, MD Bertha   aspirin delayed-release 81 mg tablet Take 81 mg by mouth daily. Yes Other, MD Bertha   ergocalciferol (Vitamin D2) 1,250 mcg (50,000 unit) capsule Take 1 Cap by mouth every seven (7) days.   Patient not taking: Reported on 9/22/2020 4/8/20 9/22/20  Sravanthi Nair MD       Allergies   Allergen Reactions    Hydrocodone Hives     Past Medical History:   Diagnosis Date    Diabetes ([facility] 04/2019    Diabetes type 2, controlled ([facility]     Hypertension     Ill-defined condition     PE    Panic attack     Pneumonia     Psychiatric disorder     Pulmonary embolism ([facility] 2015    treated with oral antiocoagulants for 12 months    Severe obstructive sleep apnea 03/2020    Urticaria      Social History     Tobacco Use    Smoking status: Former Smoker     Packs/day: 0.25    Smokeless tobacco: Never Used   Substance Use Topics    Alcohol use: No     Comment: rarely       ROS  Pertinent items noted in HPI    Objective:     Patient-Reported Vitals 9/22/2020   Patient-Reported Systolic  782   Patient-Reported Diastolic 72      General: alert, cooperative, no distress   Mental  status: normal mood, behavior, speech, dress, motor activity, and thought processes, able to follow commands   HENT: NCAT   Neck: no visualized mass   Resp: no respiratory distress   Neuro: no gross deficits   Skin: no discoloration or lesions of concern on visible areas   Psychiatric: normal affect, consistent with stated mood, no evidence of hallucinations     We discussed the expected course, resolution and complications of the diagnosis(es) in detail. Medication risks, benefits, costs, interactions, and alternatives were discussed as indicated. I advised her to contact the office if her condition worsens, changes or fails to improve as anticipated. She expressed understanding with the diagnosis(es) and plan. Cem Michaud, who was evaluated through a patient-initiated, synchronous (real-time) audio-video encounter, and/or her healthcare decision maker, is aware that it is a billable service, with coverage as determined by her insurance carrier. She provided verbal consent to proceed: Yes, and patient identification was verified. It was conducted pursuant to the emergency declaration under the [address] [place] [address] authority and the [employer] General Act. A caregiver was present when appropriate. Ability to conduct physical exam was limited. I was in the office. The patient was at home.       Thierry Jones MD

## 2020-09-29 ENCOUNTER — OFFICE VISIT (OUTPATIENT)
Dept: FAMILY MEDICINE CLINIC | Age: 40
End: 2020-09-29
Payer: MEDICAID

## 2020-09-29 VITALS
OXYGEN SATURATION: 98 % | SYSTOLIC BLOOD PRESSURE: 126 MMHG | RESPIRATION RATE: 20 BRPM | HEART RATE: 70 BPM | WEIGHT: 265.6 LBS | DIASTOLIC BLOOD PRESSURE: 78 MMHG | BODY MASS INDEX: 47.06 KG/M2 | TEMPERATURE: 96.4 F | HEIGHT: 63 IN

## 2020-09-29 DIAGNOSIS — M79.89 SWELLING OF LEFT HAND: Primary | ICD-10-CM

## 2020-09-29 PROCEDURE — 99213 OFFICE O/P EST LOW 20 MIN: CPT | Performed by: FAMILY MEDICINE

## 2020-09-29 NOTE — PROGRESS NOTES
Subjective:      Shwetha Valdivia is a 46 y.o. female here left hand swelling. Started last night after taking her blood sugar. Believes that she forget to change the lancet. Awoke last night with itching of the fingertips and just has itching now of the fingers and hand. Swelling noted. Mild itching and burning. Mild erythema. Has occurred intermittently of the right hand and feet. Did see Rheumatology at [facility] in March 2020 with negative testing at that time. Current Outpatient Medications   Medication Sig Dispense Refill    cholecalciferol (VITAMIN D3) (1000 Units /25 mcg) tablet Take 1,000 Units by mouth daily.  famotidine (Pepcid) 20 mg tablet Take 20 mg by mouth two (2) times a day.  cetirizine (ZyrTEC) 10 mg tablet Take 10 mg by mouth daily.  glucose blood VI test strips (PRODIGY NO CODING) strip Check BG daily as directed. 50 Strip 11    atorvastatin (LIPITOR) 40 mg tablet Take 1 Tab by mouth daily. 3    lisinopril-hydroCHLOROthiazide (PRINZIDE, ZESTORETIC) 20-25 mg per tablet Take 1 Tab by mouth daily. 3    metFORMIN (GLUCOPHAGE) 500 mg tablet Take 500 mg by mouth daily.  aspirin delayed-release 81 mg tablet Take 81 mg by mouth daily.  albuterol (PROVENTIL HFA, VENTOLIN HFA, PROAIR HFA) 90 mcg/actuation inhaler Take 2 Puffs by inhalation every four (4) hours as needed for Wheezing. 1 Inhaler 2    triamcinolone acetonide (KENALOG) 0.1 % topical cream APPLY TO AFFECTED AREA TWO (2) TIMES DAILY AS NEEDED FOR SKIN IRRITATION OR ITCHING.  30 g 2       Allergies   Allergen Reactions    Hydrocodone Hives       Past Medical History:   Diagnosis Date    Diabetes ([address] 04/2019    Diabetes type 2, controlled ([address]     Hypertension     Ill-defined condition     PE    Panic attack     Pneumonia     Psychiatric disorder     Pulmonary embolism ([address] 2015    treated with oral antiocoagulants for 12 months    Severe obstructive sleep apnea 03/2020    Urticaria        Social History     Tobacco Use    Smoking status: Former Smoker     Packs/day: 0.25    Smokeless tobacco: Never Used   Substance Use Topics    Alcohol use: No     Comment: rarely        Review of Systems  Pertinent items are noted in HPI. Objective:     Visit Vitals  /78 (BP 1 Location: Right arm, BP Patient Position: Sitting) Comment: manual   Pulse 70   Temp (!) 96.4 °F (35.8 °C) (Oral)   Resp 20   Ht 5' 3\" (1.6 m)   Wt 265 lb 9.6 oz (120.5 kg)   LMP 09/13/2020   SpO2 98%   BMI 47.05 kg/m²      General appearance - alert, well appearing, and in no distress  Chest - clear to auscultation, no wheezes, rales or rhonchi, symmetric air entry, no tachypnea, retractions or cyanosis  Heart - normal rate, regular rhythm, normal S1, S2, no murmurs, rubs, clicks or gallops  Musculoskeletal - left hand swollen with mild erythema on the palm, radial pulse 2+ and symmetric, no lesions noted     Assessment/Plan:   Prasad Gottlieb is a 46 y.o. female seen for:     1. Swelling of left hand: acute in onset with no signs of cellulitis on examination. Pt states that it has improved from earlier this morning. Keep elevated and use cool compresses. Will monitor as this time. I have discussed the diagnosis with the patient and the intended plan as seen in the above orders. The patient has received an after-visit summary and questions were answered concerning future plans. I have discussed medication side effects and warnings with the patient as well. Patient verbalizes understanding of plan of care and denies further questions or concerns at this time. Informed patient to return to the office if symptoms worsen or if new symptoms arise.

## 2020-09-29 NOTE — PROGRESS NOTES
Identified pt with two pt identifiers(name and ). Chief Complaint   Patient presents with    Hand Swelling     left hand started swelling last night like before with right hand   She has surgery in two weeks    Health Maintenance Due   Topic    Flu Vaccine (1)       Wt Readings from Last 3 Encounters:   20 265 lb 9.6 oz (120.5 kg)   20 268 lb 4.8 oz (121.7 kg)   20 266 lb 6.4 oz (120.8 kg)     Temp Readings from Last 3 Encounters:   20 (!) 96.4 °F (35.8 °C) (Oral)   20 98.1 °F (36.7 °C)   20 98.6 °F (37 °C) (Oral)     BP Readings from Last 3 Encounters:   20 126/78   20 135/65   20 122/80     Pulse Readings from Last 3 Encounters:   20 70   20 76   20 87         Learning Assessment:  :     Learning Assessment 2019   PRIMARY LEARNER Patient   PRIMARY LANGUAGE ENGLISH   LEARNER PREFERENCE PRIMARY DEMONSTRATION   ANSWERED BY self   RELATIONSHIP SELF       Depression Screening:  :     3 most recent PHQ Screens 2020   Little interest or pleasure in doing things Not at all   Feeling down, depressed, irritable, or hopeless Not at all   Total Score PHQ 2 0       Fall Risk Assessment:  :     No flowsheet data found. Abuse Screening:  :     Abuse Screening Questionnaire 2019   Do you ever feel afraid of your partner? N N   Are you in a relationship with someone who physically or mentally threatens you? N N   Is it safe for you to go home? Y Y       Coordination of Care Questionnaire:  :     1) Have you been to an emergency room, urgent care clinic since your last visit? no   Hospitalized since your last visit? no             2) Have you seen or consulted any other health care providers outside of [address] [place] [address] since your last visit? no  (Include any pap smears or colon screenings in this section.)    3) Do you have an Advance Directive on file?  no  Are you interested in receiving information about Advance Directives? no    Reviewed record in preparation for visit and have obtained necessary documentation. Medication reconciliation up to date and corrected with patient at this time.

## 2020-09-30 ENCOUNTER — VIRTUAL VISIT (OUTPATIENT)
Dept: FAMILY MEDICINE CLINIC | Age: 40
End: 2020-09-30
Payer: MEDICAID

## 2020-09-30 DIAGNOSIS — M79.89 SWELLING OF LEFT HAND: Primary | ICD-10-CM

## 2020-09-30 DIAGNOSIS — R22.32 LOCALIZED SWELLING OF LEFT FOREARM: ICD-10-CM

## 2020-09-30 PROCEDURE — 99213 OFFICE O/P EST LOW 20 MIN: CPT | Performed by: FAMILY MEDICINE

## 2020-09-30 RX ORDER — PREDNISONE 5 MG/1
TABLET ORAL
Qty: 21 TAB | Refills: 0 | Status: SHIPPED | OUTPATIENT
Start: 2020-09-30 | End: 2020-10-20 | Stop reason: ALTCHOICE

## 2020-09-30 NOTE — PROGRESS NOTES
Alyssa Mireles is a 46 y.o. female who was seen by synchronous (real-time) audio-video technology on 9/30/2020 for Hand Swelling (Left)      Assessment & Plan:   Diagnoses and all orders for this visit:    1. Swelling of left hand: worsened since yesterday, but again does not appear to be cellulitic. Has had previous episodes of extremity swelling which will typically resolve in a day or so. Has been seen by Rheumatology with normal evaluation. At this time, will treat with prednisone, continue with elevation and cold compresses. Sign/symptoms that would warrant reevaluation discussed. -     predniSONE (STERAPRED) 5 mg dose pack; See administration instruction per 5mg dose pack    2. Localized swelling of left forearm  -     predniSONE (STERAPRED) 5 mg dose pack; See administration instruction per 5mg dose pack      Subjective:   Pt seen for follow up. Developed swelling of left hand 2 days ago. Now has moved down into the forearm. No appreciable warmth or redness. Associated with pruritis. Has been keeping hand elevated and using cold compresses. Of note, had episode of right hand swelling last wee for which she was evaluated in ED; treated with clindamycin for presumed cellulitis. Prior to Admission medications    Medication Sig Start Date End Date Taking? Authorizing Provider   cholecalciferol (VITAMIN D3) (1000 Units /25 mcg) tablet Take 1,000 Units by mouth daily. Yes Provider, Historical   famotidine (Pepcid) 20 mg tablet Take 20 mg by mouth two (2) times a day. Yes Other, MD Bertha   albuterol (PROVENTIL HFA, VENTOLIN HFA, PROAIR HFA) 90 mcg/actuation inhaler Take 2 Puffs by inhalation every four (4) hours as needed for Wheezing. 6/26/20  Yes Edith Villatoro MD   cetirizine (ZyrTEC) 10 mg tablet Take 10 mg by mouth daily.    Yes Provider, Historical   triamcinolone acetonide (KENALOG) 0.1 % topical cream APPLY TO AFFECTED AREA TWO (2) TIMES DAILY AS NEEDED FOR SKIN IRRITATION OR ITCHING. 4/3/20 Yes Edith Villatoro MD   glucose blood VI test strips (PRODIGY NO CODING) strip Check BG daily as directed. 1/7/20  Yes Edith Villatoro MD   atorvastatin (LIPITOR) 40 mg tablet Take 1 Tab by mouth daily. 9/17/19  Yes Provider, Historical   lisinopril-hydroCHLOROthiazide (PRINZIDE, ZESTORETIC) 20-25 mg per tablet Take 1 Tab by mouth daily. 11/4/19  Yes Provider, Historical   metFORMIN (GLUCOPHAGE) 500 mg tablet Take 500 mg by mouth daily. Yes Irina, MD Bertha   aspirin delayed-release 81 mg tablet Take 81 mg by mouth daily. Yes Irina, MD Bertha       Allergies   Allergen Reactions    Hydrocodone Hives     Past Medical History:   Diagnosis Date    [facility] 04/2019    Diabetes type 2, controlled ([facility]     Hypertension     Ill-defined condition     PE    Panic attack     Pneumonia     Psychiatric disorder     Pulmonary embolism ([facility] 2015    treated with oral antiocoagulants for 12 months    Severe obstructive sleep apnea 03/2020    Urticaria      Social History     Tobacco Use    Smoking status: Former Smoker     Packs/day: 0.25    Smokeless tobacco: Never Used   Substance Use Topics    Alcohol use: No     Comment: rarely       ROS  Pertinent items noted in HPI    Objective:     Patient-Reported Vitals 9/22/2020   Patient-Reported Systolic  508   Patient-Reported Diastolic 72      General: alert, cooperative, no distress   Mental  status: normal mood, behavior, speech, dress, motor activity, and thought processes, able to follow commands   HENT: NCAT   Neck: no visualized mass   Resp: no respiratory distress   Neuro: no gross deficits   Skin: no discoloration or lesions of concern on visible areas   Psychiatric: normal affect, consistent with stated mood, no evidence of hallucinations   Musculoskeletal: Swelling of left hand up to the mid forearm, no appreciable redness       We discussed the expected course, resolution and complications of the diagnosis(es) in detail.   Medication risks, benefits, costs, interactions, and alternatives were discussed as indicated. I advised her to contact the office if her condition worsens, changes or fails to improve as anticipated. She expressed understanding with the diagnosis(es) and plan. Quan Collado, who was evaluated through a patient-initiated, synchronous (real-time) audio-video encounter, and/or her healthcare decision maker, is aware that it is a billable service, with coverage as determined by her insurance carrier. She provided verbal consent to proceed: Yes, and patient identification was verified. It was conducted pursuant to the emergency declaration under the [address] [place] [address], [address] [place] [address] authority and the [employer] General Act. A caregiver was present when appropriate. Ability to conduct physical exam was limited. I was in the office. The patient was at home.       Bay Hdz MD

## 2020-09-30 NOTE — TELEPHONE ENCOUNTER
----- Message from Devang Tejinder sent at 9/29/2020  5:21 PM EDT -----  Regarding: Dr. Berg Second Message/Vendor Calls    Caller's first and last [id number]      Reason for call:health concerns      Callback required yes/no and why: yes      Best contact number(s):[phone]      Details to clarify the request:health concerns please call the patient      Devang Marr

## 2020-10-06 ENCOUNTER — PATIENT OUTREACH (OUTPATIENT)
Dept: CASE MANAGEMENT | Age: 40
End: 2020-10-06

## 2020-10-06 ENCOUNTER — NURSE TRIAGE (OUTPATIENT)
Dept: OTHER | Facility: CLINIC | Age: 40
End: 2020-10-06

## 2020-10-06 ENCOUNTER — VIRTUAL VISIT (OUTPATIENT)
Dept: FAMILY MEDICINE CLINIC | Age: 40
End: 2020-10-06
Payer: MEDICAID

## 2020-10-06 DIAGNOSIS — J06.9 UPPER RESPIRATORY TRACT INFECTION, UNSPECIFIED TYPE: Primary | ICD-10-CM

## 2020-10-06 PROCEDURE — 99213 OFFICE O/P EST LOW 20 MIN: CPT | Performed by: NURSE PRACTITIONER

## 2020-10-06 RX ORDER — AZITHROMYCIN 250 MG/1
TABLET, FILM COATED ORAL
Qty: 6 TAB | Refills: 0 | Status: SHIPPED | OUTPATIENT
Start: 2020-10-06 | End: 2020-10-11

## 2020-10-06 NOTE — PROGRESS NOTES
HISTORY OF PRESENT ILLNESS  Mely Ba is a 46 y.o. female. HPI   Pt presents with \"cough\"  Visit was conducted via [employer] me  Pt was located at home, provider was located at [facility]  Visit lasted 4 minutes  Mely Ba, who was evaluated through a synchronous (real-time) audio-video encounter, and/or her healthcare decision maker, is aware that it is a billable service, with coverage as determined by her insurance carrier. She provided verbal consent to proceed: Yes, and patient identification was verified. It was conducted pursuant to the emergency declaration under the [facility], [facility] authority and the [employer] General Act. A caregiver was present when appropriate. Ability to conduct physical exam was limited. I was in the office. The patient was at home. Pt states that symptoms started yesterday  She states that she has a mild cough, that she is able to cough up mucous with at times  She states that when she breaths in and out, she hears a rattling in her chest  She states that her symptoms are not horrible, but she has surgery scheduled for next Wednesday, and would like for this to resolve before then. She denies fever  No sick contacts  She will be getting COVID tested on 10/12, before surgery. OTC: Mucinex  Review of Systems   Constitutional: Negative for fever. Respiratory: Positive for cough and sputum production. Physical Exam  Constitutional:       Appearance: Normal appearance. Pulmonary:      Effort: Pulmonary effort is normal.   Neurological:      Mental Status: She is alert. Psychiatric:         Mood and Affect: Mood normal.         ASSESSMENT and PLAN    ICD-10-CM ICD-9-CM    1.  Upper respiratory tract infection, unspecified type  J06.9 465.9 azithromycin (ZITHROMAX) 250 mg tablet     Educated about taking medication as prescribed, with food  Should stay well hydrated and treat fever as needed  Should notify office should symptoms continue and/or worsen    Pt informed to return to office with worsening of symptoms, or PRN with any questions or concerns. Pt verbalizes understanding of plan of care and denies further questions or concerns at this time.

## 2020-10-06 NOTE — TELEPHONE ENCOUNTER
Cough and hears a funny sound in lungs when she breathes like a rattling. No shortness of breath. Schedule for surgery next week. Reason for Disposition   Continuous (nonstop) coughing interferes with work or school and no improvement using cough treatment per Care Advice    Answer Assessment - Initial Assessment Questions  1. ONSET: \"When did the cough begin? \"       Last pm.  Having surgery next week for bariatric     2. SEVERITY: \"How bad is the cough today? \"       Dry cough. 3. RESPIRATORY DISTRESS: \"Describe your breathing. \"       Denies    4. FEVER: \"Do you have a fever? \" If so, ask: \"What is your temperature, how was it measured, and when did it start? \"      Denies    5. HEMOPTYSIS: \"Are you coughing up any blood? \" If so ask: \"How much? \" (flecks, streaks, tablespoons, etc.)      Denies    6. TREATMENT: \"What have you done so far to treat the cough? \" (e.g., meds, fluids, humidifier)      Inhaler    7. CARDIAC HISTORY: \"Do you have any history of heart disease? \" (e.g., heart attack, congestive heart failure)       Denies    8. LUNG HISTORY: \"Do you have any history of lung disease? \"  (e.g., pulmonary embolus, asthma, emphysema)      Denies    9. PE RISK FACTORS: \"Do you have a history of blood clots? \" (or: recent major surgery, recent prolonged travel, bedridden)      2015    10. OTHER SYMPTOMS: \"Do you have any other symptoms? (e.g., runny nose, wheezing, chest pain)        Congestion, see above    11. PREGNANCY: \"Is there any chance you are pregnant? \" \"When was your last menstrual period? \"        Denies    12. TRAVEL: \"Have you traveled out of the country in the last month? \" (e.g., travel history, exposures)        Denies    Protocols used: COUGH-ADULT-[place]    Received call from  at [address] [place] [address]. See above questions and answers. Caller talking full sentences without any distress on phone. Discussed disposition and patient agreeable.   Discussed potential consequences for not following disposition recommendation. Aware to call back with with any concerns or persistent, worsening, or new symptoms develop. Warm transfer to [facility] scheduling for appointment. Please do not respond to the triage nurse through this encounter. Any subsequent communication should be directly with the patient.

## 2020-10-06 NOTE — PROGRESS NOTES
Patient resolved from Transition of Care episode on 10/6/20. Discussed COVID-19 related testing which was not done at this time. Test results were not done. Patient informed of results, if available? n/a     Patient/family has been provided the following resources and education related to COVID-19:                         Signs, symptoms and red flags related to COVID-19            [place] exposure and quarantine guidelines            [employer] exposure contact - [phone]            Contact for their local Department of Health               Patient currently reports that the following symptoms have improved:  no new symptoms and no worsening symptoms. No further outreach scheduled with this CTN/ACM/LPN/HC/ MA. Episode of Care resolved. Patient has this CTN/ACM/LPN/HC/MA contact information if future needs arise.

## 2020-10-20 ENCOUNTER — VIRTUAL VISIT (OUTPATIENT)
Dept: FAMILY MEDICINE CLINIC | Age: 40
End: 2020-10-20
Payer: MEDICAID

## 2020-10-20 DIAGNOSIS — R05.9 COUGH: Primary | ICD-10-CM

## 2020-10-20 PROCEDURE — 99213 OFFICE O/P EST LOW 20 MIN: CPT | Performed by: FAMILY MEDICINE

## 2020-10-20 RX ORDER — TRAMADOL HYDROCHLORIDE 50 MG/1
TABLET ORAL
COMMUNITY
Start: 2020-10-16 | End: 2020-11-04

## 2020-10-20 RX ORDER — URSODIOL 250 MG/1
TABLET, FILM COATED ORAL
COMMUNITY
Start: 2020-10-15 | End: 2021-04-13

## 2020-10-20 RX ORDER — DOCUSATE SODIUM 100 MG
100 CAPSULE ORAL 2 TIMES DAILY
COMMUNITY
Start: 2020-10-15

## 2020-10-20 RX ORDER — PANTOPRAZOLE SODIUM 40 MG/1
40 TABLET, DELAYED RELEASE ORAL
COMMUNITY
Start: 2020-10-15

## 2020-10-20 RX ORDER — LISINOPRIL 20 MG/1
20 TABLET ORAL DAILY
COMMUNITY
Start: 2020-10-16 | End: 2020-12-10 | Stop reason: DRUGHIGH

## 2020-10-20 RX ORDER — ONDANSETRON 4 MG/1
TABLET, ORALLY DISINTEGRATING ORAL
COMMUNITY
Start: 2020-10-15 | End: 2020-11-04

## 2020-10-20 RX ORDER — FLUTICASONE PROPIONATE 50 MCG
2 SPRAY, SUSPENSION (ML) NASAL DAILY
COMMUNITY

## 2020-10-20 NOTE — PROGRESS NOTES
Albania Worrell is a 45 y.o. female    Chief Complaint   Patient presents with    Other     Pt states she feels like she has fluid in her lungs. Pt sates she had bariatric surgery on Thursday. Pt denied any SOB. She can just hear the crackles in her chest.     3 most recent PHQ Screens 4/17/2020   Little interest or pleasure in doing things Not at all   Feeling down, depressed, irritable, or hopeless Not at all   Total Score PHQ 2 0       Abuse Screening Questionnaire 4/17/2020   Do you ever feel afraid of your partner? N   Are you in a relationship with someone who physically or mentally threatens you? N   Is it safe for you to go home? Y         1. Have you been to the ER, urgent care clinic since your last visit? Hospitalized since your last visit? YES, [facility] for bariatric surgery. 2. Have you seen or consulted any other health care providers outside of the [address] [place] [address] since your last visit? Include any pap smears or colon screening.  No

## 2020-10-20 NOTE — PROGRESS NOTES
Lena Saenz is a 45 y.o. female who was seen by synchronous (real-time) audio-video technology on 10/20/2020 for Other (Pt states she feels like she has fluid in her lungs. )        Assessment & Plan:   Diagnoses and all orders for this visit:    1. Cough: mild, no LE edema or dyspnea. Observe. Signs/symptoms that would warrant emergent evaluation dicussed. Continue with IS use. Subjective:     Using incentive spirometer and walking once per hour. Denies SOB, fever  She hears a cracking in chest along with mild productive cough  More bothersome during the day  Onset on Sunday     She is s/p laparoscopic sleeve gastrectomy on 10/15/2020 with Dr. Syed Collazo [facility]). Currently only on lisinopril for hypertension which was restarted postoperative due to elevated BPs. Has been told to hold vitamin D, aspirin, lisinopril-HCTZ, and metformin. Prior to Admission medications    Medication Sig Start Date End Date Taking? Authorizing Provider   fluticasone propionate (Flonase Allergy Relief) 50 mcg/actuation nasal spray 2 Sprays by Both Nostrils route daily. Yes Provider, Historical   Stool Softener 100 mg capsule  10/15/20  Yes Provider, Historical   lisinopriL (PRINIVIL, ZESTRIL) 20 mg tablet Take 20 mg by mouth daily. 10/16/20  Yes Provider, Historical   ondansetron (ZOFRAN ODT) 4 mg disintegrating tablet  10/15/20  Yes Provider, Historical   pantoprazole (PROTONIX) 40 mg tablet  10/15/20  Yes Provider, Historical   traMADoL (ULTRAM) 50 mg tablet  10/16/20  Yes Provider, Historical   ursodioL (ACTIGALL) 250 mg tablet 250 mg = 1 tab each dose, PO, bid, start on postop day 14 when cleared by surgeon, # 60 tab, 5 Refills, Earliest fill date: 10/22/20, Pharmacy: [id number] 10/15/20 4/13/21 Yes Provider, Historical   famotidine (Pepcid) 20 mg tablet Take 20 mg by mouth two (2) times a day.    Yes Other, MD Bertha   albuterol (PROVENTIL HFA, VENTOLIN HFA, PROAIR HFA) 90 mcg/actuation inhaler Take 2 Puffs by inhalation every four (4) hours as needed for Wheezing. 6/26/20  Yes Ángel Araiza MD   cetirizine (ZyrTEC) 10 mg tablet Take 10 mg by mouth daily. Yes Provider, Historical   triamcinolone acetonide (KENALOG) 0.1 % topical cream APPLY TO AFFECTED AREA TWO (2) TIMES DAILY AS NEEDED FOR SKIN IRRITATION OR ITCHING. 4/3/20  Yes Ángel Araiza MD   glucose blood VI test strips (PRODIGY NO CODING) strip Check BG daily as directed. 1/7/20  Yes Ángel Araiza MD   atorvastatin (LIPITOR) 40 mg tablet Take 1 Tab by mouth daily. 9/17/19  Yes Provider, Historical   predniSONE (STERAPRED) 5 mg dose pack See administration instruction per 5mg dose pack  Patient not taking: Reported on 10/20/2020 9/30/20 10/20/20  Ángel Araiza MD   cholecalciferol (VITAMIN D3) (1000 Units /25 mcg) tablet Take 1,000 Units by mouth daily. 10/20/20  Provider, Historical   lisinopril-hydroCHLOROthiazide (PRINZIDE, ZESTORETIC) 20-25 mg per tablet Take 1 Tab by mouth daily. 11/4/19 10/20/20  Provider, Historical   metFORMIN (GLUCOPHAGE) 500 mg tablet Take 500 mg by mouth daily. 10/20/20  OtherBertha MD   aspirin delayed-release 81 mg tablet Take 81 mg by mouth daily.   10/20/20  OtherBertha MD       Allergies   Allergen Reactions    Hydrocodone Hives       Past Medical History:   Diagnosis Date    [facility]    [facility]     Hypertension     Ill-defined condition     PE    Panic attack     Pneumonia     Psychiatric disorder     Pul[facility]ism ([facility] 2015    treated with oral antiocoagulants for 12 months    Severe obstructive sleep apnea 03/2020    Urticaria        Social History     Tobacco Use    Smoking status: Former Smoker     Packs/day: 0.25    Smokeless tobacco: Never Used   Substance Use Topics    Alcohol use: No     Comment: rarely       ROS   Pertinent items noted in HPI    Objective:     Patient-Reported Vitals 10/20/2020   Patient-Reported Weight 245lb   Patient-Reported Temperature 98.4   Patient-Reported Systolic  403   Patient-Reported Diastolic 73      General: alert, cooperative, no distress   Mental  status: normal mood, behavior, speech, dress, motor activity, and thought processes, able to follow commands   HENT: NCAT   Neck: no visualized mass   Resp: no respiratory distress   Neuro: no gross deficits   Skin: no discoloration or lesions of concern on visible areas   Psychiatric: normal affect, consistent with stated mood, no evidence of hallucinations     We discussed the expected course, resolution and complications of the diagnosis(es) in detail. Medication risks, benefits, costs, interactions, and alternatives were discussed as indicated. I advised her to contact the office if her condition worsens, changes or fails to improve as anticipated. She expressed understanding with the diagnosis(es) and plan. Veronica Dias, who was evaluated through a patient-initiated, synchronous (real-time) audio-video encounter, and/or her healthcare decision maker, is aware that it is a billable service, with coverage as determined by her insurance carrier. She provided verbal consent to proceed: Yes, and patient identification was verified. It was conducted pursuant to the emergency declaration under the [facility], [address] [place] [address] [facility] authority and the [employer] General Act. A caregiver was present when appropriate. Ability to conduct physical exam was limited. I was in the office. The patient was at home.       Ashanti Camejo MD

## 2020-10-22 ENCOUNTER — TELEPHONE (OUTPATIENT)
Dept: FAMILY MEDICINE CLINIC | Age: 40
End: 2020-10-22

## 2020-10-22 NOTE — TELEPHONE ENCOUNTER
Patient is calling and stated that surgeon wants her to get Dr. Eugenio Ruelas to put in an order to have chest xray done. Please call at [phone] to advise if this can be done.

## 2020-11-04 ENCOUNTER — VIRTUAL VISIT (OUTPATIENT)
Dept: FAMILY MEDICINE CLINIC | Age: 40
End: 2020-11-04
Payer: MEDICAID

## 2020-11-04 ENCOUNTER — HOSPITAL ENCOUNTER (OUTPATIENT)
Dept: GENERAL RADIOLOGY | Age: 40
Discharge: HOME | End: 2020-11-04
Attending: FAMILY MEDICINE
Payer: MEDICAID

## 2020-11-04 ENCOUNTER — TELEPHONE (OUTPATIENT)
Dept: FAMILY MEDICINE CLINIC | Age: 40
End: 2020-11-04

## 2020-11-04 DIAGNOSIS — R05.9 COUGH: ICD-10-CM

## 2020-11-04 DIAGNOSIS — J06.9 URI WITH COUGH AND CONGESTION: ICD-10-CM

## 2020-11-04 DIAGNOSIS — I10 ESSENTIAL HYPERTENSION: ICD-10-CM

## 2020-11-04 DIAGNOSIS — R05.9 COUGH: Primary | ICD-10-CM

## 2020-11-04 PROCEDURE — 71046 X-RAY EXAM CHEST 2 VIEWS: CPT

## 2020-11-04 PROCEDURE — 99213 OFFICE O/P EST LOW 20 MIN: CPT | Performed by: FAMILY MEDICINE

## 2020-11-04 RX ORDER — MELATONIN
3000 DAILY
COMMUNITY

## 2020-11-04 RX ORDER — LANOLIN ALCOHOL/MO/W.PET/CERES
325 CREAM (GRAM) TOPICAL
COMMUNITY

## 2020-11-04 RX ORDER — BISMUTH SUBSALICYLATE 262 MG
1 TABLET,CHEWABLE ORAL DAILY
COMMUNITY

## 2020-11-04 RX ORDER — AZITHROMYCIN 250 MG/1
TABLET, FILM COATED ORAL
Qty: 6 TAB | Refills: 0 | Status: SHIPPED | OUTPATIENT
Start: 2020-11-04 | End: 2020-11-09

## 2020-11-04 NOTE — PROGRESS NOTES
Juan Feldman is a 45 y.o. female who was seen by synchronous (real-time) audio-video technology on 11/4/2020 for Cough    Assessment & Plan:   Diagnoses and all orders for this visit:      1. Cough: check CXR. -     XR CHEST PA LAT; Future    2. Essential hypertension: elevated over the past few days, but also has not been feeling the best. Continue with lisinopril 20 mg at this time and will continue to monitor. Discussed mediation titration if no improvement noted. 3. URI with cough and congestion  - azithromycin (ZITHROMAX) 250 mg tablet; Take 2 tablets today, then take 1 tablet daily  Dispense: 6 Tab; Refill: 0    712  Subjective:   Pt with continued cough. Onset about 2 weeks ago. Worse at night. Feels like there is phlegm in her throat that she cannot cough up. Has taken Mucinex but notes that this causes her to feel nauseous. Denies LE edema, orthopnea. Has been feeling a little anxious about this and believes this may be causing her to feel short of breath. Has noted that her blood pressure has been elevated in the evening over the past 5 days. Highest BP has been 159/98. Medications were adjusted by Surgery; discussed at her last follow up appointment and states she was told to follow up with PCP. Prior to Admission medications    Medication Sig Start Date End Date Taking? Authorizing Provider   multivitamin (ONE A DAY) tablet Take 1 Tab by mouth daily. Yes Provider, Historical   ferrous sulfate (Iron) 325 mg (65 mg iron) tablet Take 325 mg by mouth Daily (before breakfast). Yes Provider, Historical   cholecalciferol (Vitamin D3) (1000 Units /25 mcg) tablet Take 3,000 Units by mouth daily. Yes Provider, Historical   cyanocobalamin, vitamin B-12, (VITAMIN B12 PO) Take  by mouth. Yes Provider, Historical   fluticasone propionate (Flonase Allergy Relief) 50 mcg/actuation nasal spray 2 Sprays by Both Nostrils route daily.    Yes Provider, Historical   Stool Softener 100 mg capsule 10/15/20  Yes Provider, Historical   lisinopriL (PRINIVIL, ZESTRIL) 20 mg tablet Take 20 mg by mouth daily. 10/16/20  Yes Provider, Historical   pantoprazole (PROTONIX) 40 mg tablet Take 40 mg by mouth Daily (before breakfast). 10/15/20  Yes Provider, Historical   ursodioL (ACTIGALL) 250 mg tablet 250 mg = 1 tab each dose, PO, bid, start on postop day 14 when cleared by surgeon, # 60 tab, 5 Refills, Earliest fill date: 10/22/20, Pharmacy: [facility] [facility] [id number] 10/15/20 4/13/21 Yes Provider, Historical   albuterol (PROVENTIL HFA, VENTOLIN HFA, PROAIR HFA) 90 mcg/actuation inhaler Take 2 Puffs by inhalation every four (4) hours as needed for Wheezing. 6/26/20  Yes Aggie Dwyer MD   cetirizine (ZyrTEC) 10 mg tablet Take 10 mg by mouth daily. Yes Provider, Historical   triamcinolone acetonide (KENALOG) 0.1 % topical cream APPLY TO AFFECTED AREA TWO (2) TIMES DAILY AS NEEDED FOR SKIN IRRITATION OR ITCHING. 4/3/20  Yes Aggie Dwyer MD   glucose blood VI test strips (PRODIGY NO CODING) strip Check BG daily as directed. 1/7/20  Yes Aggie Dwyer MD   atorvastatin (LIPITOR) 40 mg tablet Take 1 Tab by mouth daily.  9/17/19  Yes Provider, Historical       Allergies   Allergen Reactions    Hydrocodone Hives       Past Medical History:   Diagnosis Date    Diabetes ([facility] 04/2019    Diabetes type 2, controlled ([facility]     Hypertension     Ill-defined condition     PE    Panic attack     Pneumonia     Psychiatric disorder     Pulmonary embolism ([facility] 2015    treated with oral antiocoagulants for 12 months    Severe obstructive sleep apnea 03/2020    Urticaria      Social History     Tobacco Use    Smoking status: Former Smoker     Packs/day: 0.25    Smokeless tobacco: Never Used   Substance Use Topics    Alcohol use: No     Comment: rarely       ROS   Pertinent items noted in HPI    Objective:     Patient-Reported Vitals 10/20/2020   Patient-Reported Weight 245lb   Patient-Reported Temperature 98.4 Patient-Reported Systolic  327   Patient-Reported Diastolic 73      General: alert, cooperative, no distress   Mental  status: normal mood, behavior, speech, dress, motor activity, and thought processes, able to follow commands   HENT: NCAT   Neck: no visualized mass   Resp: no respiratory distress   Neuro: no gross deficits   Skin: no discoloration or lesions of concern on visible areas   Psychiatric: normal affect, consistent with stated mood, no evidence of hallucinations       We discussed the expected course, resolution and complications of the diagnosis(es) in detail. Medication risks, benefits, costs, interactions, and alternatives were discussed as indicated. I advised her to contact the office if her condition worsens, changes or fails to improve as anticipated. She expressed understanding with the diagnosis(es) and plan. Maggie Lynch, who was evaluated through a patient-initiated, synchronous (real-time) audio-video encounter, and/or her healthcare decision maker, is aware that it is a billable service, with coverage as determined by her insurance carrier. She provided verbal consent to proceed: Yes, and patient identification was verified. It was conducted pursuant to the emergency declaration under the [facility], [address] [place] [address] authority and the [employer] Act. A caregiver was present when appropriate. Ability to conduct physical exam was limited. I was in the office. The patient was at home.       Ramiro Thompson MD

## 2020-11-04 NOTE — TELEPHONE ENCOUNTER
CXR negative for acute process. Pt called and results discussed. Likely secondary to postnasal drainage as she is also having nasal congestion and ear fullness/popping. Sent in Rx for azithromycin. Pt verbalizes understanding.

## 2020-11-09 ENCOUNTER — NURSE TRIAGE (OUTPATIENT)
Dept: OTHER | Facility: CLINIC | Age: 40
End: 2020-11-09

## 2020-11-09 NOTE — TELEPHONE ENCOUNTER
Reason for Disposition   Patient wants to be seen    Answer Assessment - Initial Assessment Questions  1. ONSET: \"When did the cough begin? \"      3 weeks    2. SEVERITY: \"How bad is the cough today? \"      Same    3. RESPIRATORY DISTRESS: \"Describe your breathing. \"       Denies    4. FEVER: \"Do you have a fever? \" If so, ask: \"What is your temperature, how was it measured, and when did it start? \"      Denies    5. HEMOPTYSIS: \"Are you coughing up any blood? \" If so ask: \"How much? \" (flecks, streaks, tablespoons, etc.)      Clear mucus    6. TREATMENT: \"What have you done so far to treat the cough? \" (e.g., meds, fluids, humidifier)      Took a round of atb drinking fluids well    7. CARDIAC HISTORY: \"Do you have any history of heart disease? \" (e.g., heart attack, congestive heart failure)       diabetes    8. LUNG HISTORY: \"Do you have any history of lung disease? \"  (e.g., pulmonary embolus, asthma, emphysema)      Denies    9. PE RISK FACTORS: \"Do you have a history of blood clots? \" (or: recent major surgery, recent prolonged travel, bedridden)    Hx of pe    10. OTHER SYMPTOMS: \"Do you have any other symptoms? (e.g., runny nose, wheezing, chest pain)        Denies    11. PREGNANCY: \"Is there any chance you are pregnant? \" \"When was your last menstrual period? \"         12. TRAVEL: \"Have you traveled out of the country in the last month? \" (e.g., travel history, exposures)        Denies    Protocols used: [id number]  pt called to make an appointment or speak with md due to cough states took a round of atb yesterday and cough no improvement    Care advice given verbalized understanding    Please do not respond to this writer for this encounter

## 2020-11-10 ENCOUNTER — VIRTUAL VISIT (OUTPATIENT)
Dept: FAMILY MEDICINE CLINIC | Age: 40
End: 2020-11-10
Payer: MEDICAID

## 2020-11-10 DIAGNOSIS — R05.9 COUGH: Primary | ICD-10-CM

## 2020-11-10 PROCEDURE — 99213 OFFICE O/P EST LOW 20 MIN: CPT | Performed by: FAMILY MEDICINE

## 2020-11-10 RX ORDER — BENZONATATE 100 MG/1
100 CAPSULE ORAL
Qty: 21 CAP | Refills: 0 | Status: SHIPPED | OUTPATIENT
Start: 2020-11-10 | End: 2020-11-17

## 2020-11-10 NOTE — PROGRESS NOTES
Silas Cobos is a 45 y.o. female who was seen by synchronous (real-time) audio-video technology on 11/10/2020 for Cough      Assessment & Plan:   Diagnoses and all orders for this visit:    1. Cough  -     benzonatate (Tessalon Perles) 100 mg capsule; Take 1 Cap by mouth three (3) times daily as needed for Cough for up to 7 days. - Continue with Mucinex and pushing fluids   - Reevaluate if no improvement noted      Subjective:   Continues with cough which is now intermittent productive of clear sputum. Onset was about 3 weeks ago. Completed course of azithromycin 2 days ago with minimal improvement. Still taking Mucinex and drinking lots of water. Associated with mild sore throat and low grade temps with Tmax 99.5 F. Denies dyspnea, LE edema, orthopnea. Her fiancee recently diagnosed with strep throat. Prior to Admission medications    Medication Sig Start Date End Date Taking? Authorizing Provider   multivitamin (ONE A DAY) tablet Take 1 Tab by mouth daily. Yes Provider, Historical   ferrous sulfate (Iron) 325 mg (65 mg iron) tablet Take 325 mg by mouth Daily (before breakfast). Yes Provider, Historical   cholecalciferol (Vitamin D3) (1000 Units /25 mcg) tablet Take 3,000 Units by mouth daily. Yes Provider, Historical   cyanocobalamin, vitamin B-12, (VITAMIN B12 PO) Take  by mouth. Yes Provider, Historical   fluticasone propionate (Flonase Allergy Relief) 50 mcg/actuation nasal spray 2 Sprays by Both Nostrils route daily. Yes Provider, Historical   Stool Softener 100 mg capsule  10/15/20  Yes Provider, Historical   lisinopriL (PRINIVIL, ZESTRIL) 20 mg tablet Take 20 mg by mouth daily. 10/16/20  Yes Provider, Historical   pantoprazole (PROTONIX) 40 mg tablet Take 40 mg by mouth Daily (before breakfast).  10/15/20  Yes Provider, Historical   ursodioL (ACTIGALL) 250 mg tablet 250 mg = 1 tab each dose, PO, bid, start on postop day 14 when cleared by surgeon, # 60 tab, 5 Refills, Earliest fill date: 10/22/20, Pharmacy: [facility] 10/15/20 4/13/21 Yes Provider, Historical   albuterol (PROVENTIL HFA, VENTOLIN HFA, PROAIR HFA) 90 mcg/actuation inhaler Take 2 Puffs by inhalation every four (4) hours as needed for Wheezing. 6/26/20  Yes Claudia Banda MD   cetirizine (ZyrTEC) 10 mg tablet Take 10 mg by mouth daily. Yes Provider, Historical   triamcinolone acetonide (KENALOG) 0.1 % topical cream APPLY TO AFFECTED AREA TWO (2) TIMES DAILY AS NEEDED FOR SKIN IRRITATION OR ITCHING. 4/3/20  Yes Claudia Banda MD   glucose blood VI test strips (PRODIGY NO CODING) strip Check BG daily as directed. 1/7/20  Yes Claudia Banda MD   atorvastatin (LIPITOR) 40 mg tablet Take 1 Tab by mouth daily.  9/17/19  Yes Provider, Historical   azithromycin (ZITHROMAX) 250 mg tablet Take 2 tablets today, then take 1 tablet daily 11/4/20 11/9/20  Claudia Banda MD       Allergies   Allergen Reactions    Hydrocodone Hives       Past Medical History:   Diagnosis Date    Diabetes ([facility] [address] 04/2019    Diabetes type 2, controlled ([facility] [facility]     Hypertension     Ill-defined condition     PE    Panic attack     Pneumonia     Psychiatric disorder     Pulmonary embolism ([facility] [address] 2015    treated with oral antiocoagulants for 12 months    Severe obstructive sleep apnea 03/2020    Urticaria        Social History     Tobacco Use    Smoking status: Former Smoker     Packs/day: 0.25    Smokeless tobacco: Never Used   Substance Use Topics    Alcohol use: No     Comment: rarely       ROS   Pertinent items noted in HPI      Objective:     Patient-Reported Vitals 10/20/2020   Patient-Reported Weight 245lb   Patient-Reported Temperature 98.4   Patient-Reported Systolic  200   Patient-Reported Diastolic 73      General: alert, cooperative, no distress, able to speak in complete sentences   Mental  status: normal mood, behavior, speech, dress, motor activity, and thought processes, able to follow commands   HENT: NCAT   Neck: no visualized mass   Resp: no respiratory distress, intermittent cough noted   Neuro: no gross deficits   Skin: no discoloration or lesions of concern on visible areas   Psychiatric: normal affect, consistent with stated mood, no evidence of hallucinations         We discussed the expected course, resolution and complications of the diagnosis(es) in detail. Medication risks, benefits, costs, interactions, and alternatives were discussed as indicated. I advised her to contact the office if her condition worsens, changes or fails to improve as anticipated. She expressed understanding with the diagnosis(es) and plan. Silas Cobos, who was evaluated through a patient-initiated, synchronous (real-time) audio-video encounter, and/or her healthcare decision maker, is aware that it is a billable service, with coverage as determined by her insurance carrier. She provided verbal consent to proceed: Yes, and patient identification was verified. It was conducted pursuant to the emergency declaration under the [address] [place] [address] authority and the [employer] General Act. A caregiver was present when appropriate. Ability to conduct physical exam was limited. I was in the office. The patient was at home.       Arminda Pablo MD

## 2020-12-09 ENCOUNTER — VIRTUAL VISIT (OUTPATIENT)
Dept: FAMILY MEDICINE CLINIC | Age: 40
End: 2020-12-09
Payer: MEDICAID

## 2020-12-09 DIAGNOSIS — R07.9 RIGHT-SIDED CHEST PAIN: ICD-10-CM

## 2020-12-09 DIAGNOSIS — I10 ELEVATED BLOOD PRESSURE READING WITH DIAGNOSIS OF HYPERTENSION: Primary | ICD-10-CM

## 2020-12-09 PROCEDURE — 99441 PR PHYS/QHP TELEPHONE EVALUATION 5-10 MIN: CPT | Performed by: FAMILY MEDICINE

## 2020-12-09 NOTE — PROGRESS NOTES
Kassandra Alonso is a 45 y.o. female, evaluated via audio-only technology on 12/9/2020 for Hypertension  . Assessment & Plan:   Diagnoses and all orders for this visit:    1. Elevated blood pressure reading with diagnosis of hypertension: home BP elevated this morning, but reported to be improved from yesterday. Could consider panic attack given her symptoms during this episodes. Still continues with severe right chest pain with radiation into the axilla and back with no known injury; ?if referred. Given symptoms would recommend evaluation at her nearest urgent care center/ED. Pt verbalizes understanding. 2. Right-sided chest pain  12  Subjective:   Episodes of elevated BP readings yesterday; highest reading 182/93. Associated with sensation of heart racing, feeling short of breath, and chest pain (left and right sided). Has had these symptoms during previous panic attacks and does not know if this was culprit as she has not had in some time. Treated with albuterol which did help with her breathing. Also had pain of the right arm with radiation in the right axilla and shoulder blade. Pain is pinching in quality and does not improve with use of Tylenol. Unsure if she may have slept wrong. No recent lifting or moving. Has been compliant with medications are prescribed. BP this morning 152/82. Prior to Admission medications    Medication Sig Start Date End Date Taking? Authorizing Provider   multivitamin (ONE A DAY) tablet Take 1 Tab by mouth daily. Yes Provider, Historical   ferrous sulfate (Iron) 325 mg (65 mg iron) tablet Take 325 mg by mouth Daily (before breakfast). Yes Provider, Historical   cholecalciferol (Vitamin D3) (1000 Units /25 mcg) tablet Take 3,000 Units by mouth daily. Yes Provider, Historical   cyanocobalamin, vitamin B-12, (VITAMIN B12 PO) Take  by mouth.    Yes Provider, Historical   fluticasone propionate (Flonase Allergy Relief) 50 mcg/actuation nasal spray 2 Sprays by Both Nostrils route daily. Yes Provider, Historical   Stool Softener 100 mg capsule  10/15/20  Yes Provider, Historical   lisinopriL (PRINIVIL, ZESTRIL) 20 mg tablet Take 20 mg by mouth daily. 10/16/20  Yes Provider, Historical   pantoprazole (PROTONIX) 40 mg tablet Take 40 mg by mouth Daily (before breakfast). 10/15/20  Yes Provider, Historical   ursodioL (ACTIGALL) 250 mg tablet 250 mg = 1 tab each dose, PO, bid, start on postop day 14 when cleared by surgeon, # 60 tab, 5 Refills, Earliest fill date: 10/22/20, Pharmacy: [facility] 10/15/20 4/13/21 Yes Provider, Historical   albuterol (PROVENTIL HFA, VENTOLIN HFA, PROAIR HFA) 90 mcg/actuation inhaler Take 2 Puffs by inhalation every four (4) hours as needed for Wheezing. 6/26/20  Yes Ángel Araiza MD   cetirizine (ZyrTEC) 10 mg tablet Take 10 mg by mouth daily. Yes Provider, Historical   triamcinolone acetonide (KENALOG) 0.1 % topical cream APPLY TO AFFECTED AREA TWO (2) TIMES DAILY AS NEEDED FOR SKIN IRRITATION OR ITCHING. 4/3/20  Yes Ángel Araiza MD   glucose blood VI test strips (PRODIGY NO CODING) strip Check BG daily as directed. 1/7/20  Yes Ángel Araiza MD   atorvastatin (LIPITOR) 40 mg tablet Take 1 Tab by mouth daily.  9/17/19  Yes Provider, Historical       Allergies   Allergen Reactions    Hydrocodone Hives     Past Medical History:   Diagnosis Date    Diabetes ([facility] [address] 04/2019    Diabetes type 2, controlled ([facility] [address] [facility]     Hypertension     Ill-defined condition     PE    Panic attack     Pneumonia     Psychiatric disorder     Pulmonary embolism ([facility] [address] 2015    treated with oral antiocoagulants for 12 months    Severe obstructive sleep apnea 03/2020    Urticaria        Social History     Tobacco Use    Smoking status: Former Smoker     Packs/day: 0.25    Smokeless tobacco: Never Used   Substance Use Topics    Alcohol use: No     Comment: rarely       ROS   Pertinent items noted in HPI    Patient-Reported Vitals 12/9/2020 Patient-Reported Weight -   Patient-Reported Temperature -   Patient-Reported Systolic  159   Patient-Reported Diastolic 82        Ann Xiao, who was evaluated through a patient-initiated, synchronous (real-time) audio only encounter, and/or her healthcare decision maker, is aware that it is a billable service, with coverage as determined by her insurance carrier. She provided verbal consent to proceed: Yes. She has not had a related appointment within my department in the past 7 days or scheduled within the next 24 hours.       Total Time: minutes: Patricia Jaffe MD

## 2020-12-10 ENCOUNTER — VIRTUAL VISIT (OUTPATIENT)
Dept: FAMILY MEDICINE CLINIC | Age: 40
End: 2020-12-10
Payer: MEDICAID

## 2020-12-10 DIAGNOSIS — R39.9 UTI SYMPTOMS: ICD-10-CM

## 2020-12-10 DIAGNOSIS — M54.9 UPPER BACK PAIN ON RIGHT SIDE: Primary | ICD-10-CM

## 2020-12-10 DIAGNOSIS — I10 ESSENTIAL HYPERTENSION: ICD-10-CM

## 2020-12-10 PROCEDURE — 99442 PR PHYS/QHP TELEPHONE EVALUATION 11-20 MIN: CPT | Performed by: FAMILY MEDICINE

## 2020-12-10 RX ORDER — LISINOPRIL 40 MG/1
40 TABLET ORAL DAILY
Qty: 30 TAB | Refills: 5 | Status: SHIPPED | OUTPATIENT
Start: 2020-12-10 | End: 2021-06-01

## 2020-12-10 RX ORDER — ACETAMINOPHEN 325 MG/1
TABLET ORAL
COMMUNITY

## 2020-12-10 RX ORDER — METHYLPREDNISOLONE 4 MG/1
TABLET ORAL
Qty: 1 DOSE PACK | Refills: 0 | Status: SHIPPED | OUTPATIENT
Start: 2020-12-10 | End: 2020-12-21 | Stop reason: ALTCHOICE

## 2020-12-10 RX ORDER — NITROFURANTOIN 25; 75 MG/1; MG/1
100 CAPSULE ORAL 2 TIMES DAILY
Qty: 10 CAP | Refills: 0 | Status: SHIPPED | OUTPATIENT
Start: 2020-12-10 | End: 2020-12-15

## 2020-12-10 RX ORDER — TRAMADOL HYDROCHLORIDE 50 MG/1
50 TABLET ORAL
Qty: 12 TAB | Refills: 0 | Status: SHIPPED | OUTPATIENT
Start: 2020-12-10 | End: 2020-12-13

## 2020-12-10 NOTE — PROGRESS NOTES
Kassandra Alonso is a 45 y.o. female, evaluated via audio-only technology on 12/10/2020 for JUSTINO [facility]ARD [facility] - [facility] Follow Up [place], [address] [facility] for CP 12/9/2020)  . Assessment & Plan:   Diagnoses and all orders for this visit:    1. Upper back pain on right side: ED evaluation with negative work up. Will treat with Medrol and PRN tramadol for pain (unable to take NSAIDs due to gastric surgery). -     methylPREDNISolone (MEDROL DOSEPACK) 4 mg tablet; Use as directed in package  -     traMADoL (ULTRAM) 50 mg tablet; Take 1 Tab by mouth every eight (8) hours as needed for Pain for up to 3 days. Max Daily Amount: 150 mg.    2. UTI symptoms: will empirically treat with Macrobid. -     nitrofurantoin, macrocrystal-monohydrate, (MACROBID) 100 mg capsule; Take 1 Cap by mouth two (2) times a day for 5 days. 3. Essential hypertension: continues to remain elevated, increase lisinopril to 40 mg. Follow up in office within 1-2 weeks for BP check. Continue to monitor at home. -     lisinopriL (PRINIVIL, ZESTRIL) 40 mg tablet; Take 1 Tab by mouth daily. 12  Subjective:   Evaluated at [facility] on 12/9/2020 for chest pain. ED record received and reviewed: CTA chest negative for PE although D-dimer mildly elevated. EKG normal, troponin normal. UA suggestive for UTI and recommended that she discuss with PCP. Urine has been darker in color and has had foul odor over the past few days. No dysuria, burning with urination. Has had had lower right abdominal discomfort yesterday which resolved a few hours ago. Intermittent fluttering in the chest. Pain in the back has been constant; has treated with Tylenol Q 6 hours with no improvement, pain in the mid-shoulder blades up to the neck, muscle relaxer x 2 days. Treated with tramadol in the ED. Prior to Admission medications    Medication Sig Start Date End Date Taking?  Authorizing Provider   acetaminophen (TylenoL) 325 mg tablet Take  by mouth every six (6) hours as needed for Pain. Yes Provider, Historical   multivitamin (ONE A DAY) tablet Take 1 Tab by mouth daily. Yes Provider, Historical   ferrous sulfate (Iron) 325 mg (65 mg iron) tablet Take 325 mg by mouth Daily (before breakfast). Yes Provider, Historical   cholecalciferol (Vitamin D3) (1000 Units /25 mcg) tablet Take 3,000 Units by mouth daily. Yes Provider, Historical   cyanocobalamin, vitamin B-12, (VITAMIN B12 PO) Take  by mouth. Yes Provider, Historical   fluticasone propionate (Flonase Allergy Relief) 50 mcg/actuation nasal spray 2 Sprays by Both Nostrils route daily. Yes Provider, Historical   Stool Softener 100 mg capsule Take 100 mg by mouth two (2) times a day. 10/15/20  Yes Provider, Historical   lisinopriL (PRINIVIL, ZESTRIL) 20 mg tablet Take 20 mg by mouth daily. 10/16/20  Yes Provider, Historical   pantoprazole (PROTONIX) 40 mg tablet Take 40 mg by mouth Daily (before breakfast). 10/15/20  Yes Provider, Historical   ursodioL (ACTIGALL) 250 mg tablet 250 mg = 1 tab each dose, PO, bid, start on postop day 14 when cleared by surgeon, # 60 tab, 5 Refills, Earliest fill date: 10/22/20, Pharmacy: [facility] [id number] 10/15/20 4/13/21 Yes Provider, Historical   albuterol (PROVENTIL HFA, VENTOLIN HFA, PROAIR HFA) 90 mcg/actuation inhaler Take 2 Puffs by inhalation every four (4) hours as needed for Wheezing. 6/26/20  Yes Resa Frankel, MD   cetirizine (ZyrTEC) 10 mg tablet Take 10 mg by mouth daily. Yes Provider, Historical   triamcinolone acetonide (KENALOG) 0.1 % topical cream APPLY TO AFFECTED AREA TWO (2) TIMES DAILY AS NEEDED FOR SKIN IRRITATION OR ITCHING. 4/3/20  Yes Resa Frankel, MD   glucose blood VI test strips (PRODIGY NO CODING) strip Check BG daily as directed. 1/7/20  Yes Resa Frankel, MD   atorvastatin (LIPITOR) 40 mg tablet Take 1 Tab by mouth daily.  9/17/19  Yes Provider, Historical       Allergies   Allergen Reactions    Hydrocodone Hives     Past Medical History:   Diagnosis Date    Diabetes ([facility] 04/2019    Diabetes type 2, controlled ([facility]     Hypertension     Ill-defined condition     PE    Panic attack     Pneumonia     Psychiatric disorder     Pulmonary embolism ([facility] 2015    treated with oral antiocoagulants for 12 months    Severe obstructive sleep apnea 03/2020    Urticaria      Social History     Tobacco Use    Smoking status: Former Smoker     Packs/day: 0.25    Smokeless tobacco: Never Used   Substance Use Topics    Alcohol use: No     Comment: rarely       ROS   Pertinent items noted in HPI    Patient-Reported Vitals 12/10/2020   Patient-Reported Weight -   Patient-Reported Pulse 60   Patient-Reported Temperature -   Patient-Reported Systolic  729   Patient-Reported Diastolic 89        Ann Xiao, who was evaluated through a patient-initiated, synchronous (real-time) audio only encounter, and/or her healthcare decision maker, is aware that it is a billable service, with coverage as determined by her insurance carrier. She provided verbal consent to proceed: Yes. She has not had a related appointment within my department in the past 7 days or scheduled within the next 24 hours.       Total Time: minutes: [address], MD

## 2020-12-10 NOTE — PROGRESS NOTES
Halina Andrews is a 45 y.o. female    Chief Complaint   Patient presents with   [address] in [place], [place] for CP 12/9/2020     Patient states she has some fluttering in her chest that started about 2 days ago - comes and goes. Patient states her back pain started in her arm and radiates to her upper right back that started x1 wk ago. 3 most recent PHQ Screens 4/17/2020   Little interest or pleasure in doing things Not at all   Feeling down, depressed, irritable, or hopeless Not at all   Total Score PHQ 2 0       Abuse Screening Questionnaire 4/17/2020   Do you ever feel afraid of your partner? N   Are you in a relationship with someone who physically or mentally threatens you? N   Is it safe for you to go home? Y         1. Have you been to the ER, urgent care clinic since your last visit? Hospitalized since your last visit? YES. [facility] in [place], [place] for CP 12/9/2020    2. Have you seen or consulted any other health care providers outside of the [address] [place] [address] since your last visit? Include any pap smears or colon screening.  No

## 2020-12-21 ENCOUNTER — OFFICE VISIT (OUTPATIENT)
Dept: FAMILY MEDICINE CLINIC | Age: 40
End: 2020-12-21
Payer: MEDICAID

## 2020-12-21 VITALS
HEART RATE: 69 BPM | WEIGHT: 239.8 LBS | RESPIRATION RATE: 20 BRPM | OXYGEN SATURATION: 98 % | BODY MASS INDEX: 42.49 KG/M2 | DIASTOLIC BLOOD PRESSURE: 82 MMHG | SYSTOLIC BLOOD PRESSURE: 120 MMHG | HEIGHT: 63 IN | TEMPERATURE: 98.4 F

## 2020-12-21 DIAGNOSIS — M54.9 UPPER BACK PAIN ON RIGHT SIDE: Primary | ICD-10-CM

## 2020-12-21 DIAGNOSIS — E11.9 CONTROLLED TYPE 2 DIABETES MELLITUS WITHOUT COMPLICATION, WITHOUT LONG-TERM CURRENT USE OF INSULIN (HCC): ICD-10-CM

## 2020-12-21 DIAGNOSIS — I10 ESSENTIAL HYPERTENSION: ICD-10-CM

## 2020-12-21 LAB
ALBUMIN SERPL-MCNC: 3.4 G/DL (ref 3.5–5)
ALBUMIN/GLOB SERPL: 1 {RATIO} (ref 1.1–2.2)
ALP SERPL-CCNC: 66 U/L (ref 45–117)
ALT SERPL-CCNC: 13 U/L (ref 12–78)
ANION GAP SERPL CALC-SCNC: 4 MMOL/L (ref 5–15)
AST SERPL-CCNC: 8 U/L (ref 15–37)
BILIRUB SERPL-MCNC: 0.4 MG/DL (ref 0.2–1)
BUN SERPL-MCNC: 19 MG/DL (ref 6–20)
BUN/CREAT SERPL: 32 (ref 12–20)
CALCIUM SERPL-MCNC: 8.9 MG/DL (ref 8.5–10.1)
CHLORIDE SERPL-SCNC: 110 MMOL/L (ref 97–108)
CO2 SERPL-SCNC: 27 MMOL/L (ref 21–32)
CREAT SERPL-MCNC: 0.59 MG/DL (ref 0.55–1.02)
EST. AVERAGE GLUCOSE BLD GHB EST-MCNC: 97 MG/DL
GLOBULIN SER CALC-MCNC: 3.3 G/DL (ref 2–4)
GLUCOSE SERPL-MCNC: 93 MG/DL (ref 65–100)
HBA1C MFR BLD: 5 % (ref 4–5.6)
POTASSIUM SERPL-SCNC: 4.6 MMOL/L (ref 3.5–5.1)
PROT SERPL-MCNC: 6.7 G/DL (ref 6.4–8.2)
SODIUM SERPL-SCNC: 141 MMOL/L (ref 136–145)

## 2020-12-21 PROCEDURE — 99213 OFFICE O/P EST LOW 20 MIN: CPT | Performed by: FAMILY MEDICINE

## 2020-12-21 RX ORDER — IBUPROFEN 200 MG
600 CAPSULE ORAL 2 TIMES DAILY
COMMUNITY

## 2020-12-21 RX ORDER — DICLOFENAC SODIUM 10 MG/G
4 GEL TOPICAL 4 TIMES DAILY
Qty: 2 EACH | Refills: 2 | Status: SHIPPED | OUTPATIENT
Start: 2020-12-21

## 2020-12-21 NOTE — PROGRESS NOTES
Identified pt with two pt identifiers(name and ). Chief Complaint   Patient presents with   [facility] Follow Up     went to [facility] they told her no PE but she could be forming one ? 2020     Hypertension        There are no preventive care reminders to display for this patient. Wt Readings from Last 3 Encounters:   20 239 lb 12.8 oz (108.8 kg)   20 265 lb 9.6 oz (120.5 kg)   20 268 lb 4.8 oz (121.7 kg)     Temp Readings from Last 3 Encounters:   20 98.4 °F (36.9 °C) (Oral)   20 (!) 96.4 °F (35.8 °C) (Oral)   20 98.1 °F (36.7 °C)     BP Readings from Last 3 Encounters:   20 120/82   20 126/78   20 135/65     Pulse Readings from Last 3 Encounters:   20 69   20 70   20 76         Learning Assessment:  :     Learning Assessment 2019   PRIMARY LEARNER Patient   PRIMARY LANGUAGE ENGLISH   LEARNER PREFERENCE PRIMARY DEMONSTRATION   ANSWERED BY self   RELATIONSHIP SELF       Depression Screening:  :     3 most recent PHQ Screens 2020   Little interest or pleasure in doing things Not at all   Feeling down, depressed, irritable, or hopeless Not at all   Total Score PHQ 2 0       Fall Risk Assessment:  :     No flowsheet data found. Abuse Screening:  :     Abuse Screening Questionnaire 2019   Do you ever feel afraid of your partner? N N   Are you in a relationship with someone who physically or mentally threatens you? N N   Is it safe for you to go home? Y Y       Coordination of Care Questionnaire:  :     1) Have you been to an emergency room, urgent care clinic since your last visit? yes [address] [facility]  Hospitalized since your last visit? no             2) Have you seen or consulted any other health care providers outside of [address] [place] [address] since your last visit? Yes Virtual Surgeon  (Include any pap smears or colon screenings in this section.)    3) Do you have an Advance Directive on file?  no  Are you interested in receiving information about Advance Directives? no    Patient is accompanied by daughter I have received verbal consent from [address] [place] [address] to discuss any/all medical information while they are present in the room. Reviewed record in preparation for visit and have obtained necessary documentation. Medication reconciliation up to date and corrected with patient at this time.

## 2020-12-21 NOTE — PROGRESS NOTES
Subjective:      Adrian Borjas is a 45 y.o. female here for hypertension follow up. Had not been well controlled and lisinopril increased to 40 mg daily. Home readings have improved with dose adjustment. Has had mild headaches. Pain in the upper right back continues. Has had ED evaluation. Pain is wrapping around her right breast as well. Has had intermittent pains on the left. Taking Tylenol with out improvement. Has been taking dose of tramadol at night. NSAIDs not advised due to gastric surgery. Denies shortness of breath, abdominal pain, n/v, cough. Current Outpatient Medications   Medication Sig Dispense Refill    calcium citrate 200 mg (950 mg) tablet Take 600 mg by mouth two (2) times a day.  acetaminophen (TylenoL) 325 mg tablet Take  by mouth every six (6) hours as needed for Pain.  lisinopriL (PRINIVIL, ZESTRIL) 40 mg tablet Take 1 Tab by mouth daily. 30 Tab 5    multivitamin (ONE A DAY) tablet Take 1 Tab by mouth daily.  ferrous sulfate (Iron) 325 mg (65 mg iron) tablet Take 325 mg by mouth Daily (before breakfast).  cholecalciferol (Vitamin D3) (1000 Units /25 mcg) tablet Take 3,000 Units by mouth daily.  cyanocobalamin, vitamin B-12, (VITAMIN B12 PO) Take  by mouth.  fluticasone propionate (Flonase Allergy Relief) 50 mcg/actuation nasal spray 2 Sprays by Both Nostrils route daily.  Stool Softener 100 mg capsule Take 100 mg by mouth two (2) times a day.  pantoprazole (PROTONIX) 40 mg tablet Take 40 mg by mouth Daily (before breakfast).  ursodioL (ACTIGALL) 250 mg tablet 250 mg = 1 tab each dose, PO, bid, start on postop day 14 when cleared by surgeon, # 60 tab, 5 Refills, Earliest fill date: 10/22/20, [id number]      cetirizine (ZyrTEC) 10 mg tablet Take 10 mg by mouth daily.  glucose blood VI test strips (PRODIGY NO CODING) strip Check BG daily as directed.  50 Strip 11    atorvastatin (LIPITOR) 40 mg tablet Take 1 Tab by mouth daily. 3    albuterol (PROVENTIL HFA, VENTOLIN HFA, PROAIR HFA) 90 mcg/actuation inhaler Take 2 Puffs by inhalation every four (4) hours as needed for Wheezing. 1 Inhaler 2    triamcinolone acetonide (KENALOG) 0.1 % topical cream APPLY TO AFFECTED AREA TWO (2) TIMES DAILY AS NEEDED FOR SKIN IRRITATION OR ITCHING. 30 g 2       Allergies   Allergen Reactions    Hydrocodone Hives       Past Medical History:   Diagnosis Date    Diabetes ([facility] 04/2019    Diabetes type 2, controlled ([facility]     Hypertension     Ill-defined condition     PE    Panic attack     Pneumonia     Psychiatric disorder     Pulmonary embolism ([facility] 2015    treated with oral antiocoagulants for 12 months    Severe obstructive sleep apnea 03/2020    Urticaria        Social History     Tobacco Use    Smoking status: Former Smoker     Packs/day: 0.25    Smokeless tobacco: Never Used   Substance Use Topics    Alcohol use: No     Comment: rarely        Review of Systems  Pertinent items are noted in HPI. Objective:     Visit Vitals  /82 (BP 1 Location: Left arm, BP Patient Position: Sitting) Comment: manual   Pulse 69   Temp 98.4 °F (36.9 °C) (Oral)   Resp 20   Ht 5' 3\" (1.6 m)   Wt 239 lb 12.8 oz (108.8 kg)   LMP 12/20/2020   SpO2 98%   BMI 42.48 kg/m²      General appearance - alert, well appearing, and in no distress  Chest - clear to auscultation, no wheezes, rales or rhonchi, symmetric air entry, no tachypnea, retractions or cyanosis  Heart - normal rate, regular rhythm, normal S1, S2, no murmurs, rubs, clicks or gallops  Musculoskeletal - (+) right trapezial muscle tenderness    Assessment/Plan:   Jamarcus Baeza is a 45 y.o. female seen for:     1. Upper back pain on right side: trial of diclofenac therapy as below. Consider sports medicine eval if no improvement. - diclofenac (VOLTAREN) 1 % gel; Apply 4 g to affected area four (4) times daily. Dispense: 2 Each; Refill: 2    2.  Essential hypertension: controlled, continue with current therapy. - METABOLIC PANEL, COMPREHENSIVE; Future    3. Controlled type 2 diabetes mellitus without complication, without long-term current use of insulin ([facility]): check A1c.   - HEMOGLOBIN A1C WITH EAG; Future      I have discussed the diagnosis with the patient and the intended plan as seen in the above orders. The patient has received an after-visit summary and questions were answered concerning future plans. I have discussed medication side effects and warnings with the patient as well. Patient verbalizes understanding of plan of care and denies further questions or concerns at this time. Informed patient to return to the office if symptoms worsen or if new symptoms arise.

## 2021-01-11 RX ORDER — ATORVASTATIN CALCIUM 40 MG/1
40 TABLET, FILM COATED ORAL DAILY
Qty: 90 TAB | Refills: 3 | Status: SHIPPED | OUTPATIENT
Start: 2021-01-11 | End: 2022-03-21 | Stop reason: SDUPTHER

## 2021-01-26 ENCOUNTER — VIRTUAL VISIT (OUTPATIENT)
Dept: FAMILY MEDICINE CLINIC | Age: 41
End: 2021-01-26
Payer: MEDICAID

## 2021-01-26 DIAGNOSIS — Z71.85 ENCOUNTER FOR COUNSELING REGARDING IMMUNIZATION: ICD-10-CM

## 2021-01-26 DIAGNOSIS — K04.7 TOOTH ABSCESS: Primary | ICD-10-CM

## 2021-01-26 PROCEDURE — 99213 OFFICE O/P EST LOW 20 MIN: CPT | Performed by: FAMILY MEDICINE

## 2021-01-26 RX ORDER — AMOXICILLIN AND CLAVULANATE POTASSIUM 875; 125 MG/1; MG/1
1 TABLET, FILM COATED ORAL EVERY 12 HOURS
Qty: 20 TAB | Refills: 0 | Status: SHIPPED | OUTPATIENT
Start: 2021-01-26 | End: 2021-01-29 | Stop reason: SDUPTHER

## 2021-01-26 NOTE — PROGRESS NOTES
Jeff Shelton is a 45 y.o. female who was seen by synchronous (real-time) audio-video technology on 1/26/2021 for Dental Problem and Advice Only (concerning COVID-19 vaccine)        Assessment & Plan:   Diagnoses and all orders for this visit:    1. Tooth abscess  -     amoxicillin-clavulanate (AUGMENTIN) 875-125 mg per tablet; Take 1 Tab by mouth every twelve (12) hours for 10 days.  - Follow up with dentistry as scheduled    2. Encounter for counseling regarding immunization: no contraindication to COVID-19 vaccine. Letter written and faxed to [facility] per patient's request. Would recommend that she be monitored for 30 minutes after vaccination. 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 Reports that due to her previous h/o PE and urticaria note required that she is cleared to have vaccine. Has abscessed tooth in the right lower tooth. Has dental appointment in another week. Having pain and swelling along her gumline. Prior to Admission medications    Medication Sig Start Date End Date Taking? Authorizing Provider   atorvastatin (LIPITOR) 40 mg tablet Take 1 Tab by mouth daily. 1/11/21  Yes Francisca Bay MD   calcium citrate 200 mg (950 mg) tablet Take 600 mg by mouth two (2) times a day. Yes Provider, Historical   diclofenac (VOLTAREN) 1 % gel Apply 4 g to affected area four (4) times daily. 12/21/20  Yes Francisca Bay MD   acetaminophen (TylenoL) 325 mg tablet Take  by mouth every six (6) hours as needed for Pain. Yes Provider, Historical   lisinopriL (PRINIVIL, ZESTRIL) 40 mg tablet Take 1 Tab by mouth daily. 12/10/20  Yes Francisca Bay MD   multivitamin (ONE A DAY) tablet Take 1 Tab by mouth daily. Yes Provider, Historical   ferrous sulfate (Iron) 325 mg (65 mg iron) tablet Take 325 mg by mouth Daily (before breakfast).    Yes Provider, Historical   cholecalciferol (Vitamin D3) (1000 Units /25 mcg) tablet Take 3,000 Units by mouth daily. Yes Provider, Historical   cyanocobalamin, vitamin B-12, (VITAMIN B12 PO) Take  by mouth. Yes Provider, Historical   fluticasone propionate (Flonase Allergy Relief) 50 mcg/actuation nasal spray 2 Sprays by Both Nostrils route daily. Yes Provider, Historical   Stool Softener 100 mg capsule Take 100 mg by mouth two (2) times a day. 10/15/20  Yes Provider, Historical   pantoprazole (PROTONIX) 40 mg tablet Take 40 mg by mouth Daily (before breakfast). 10/15/20  Yes Provider, Historical   ursodioL (ACTIGALL) 250 mg tablet 250 mg = 1 tab each dose, PO, bid, start on postop day 14 when cleared by surgeon, # 60 tab, 5 Refills, Earliest fill date: 10/22/20, Pharmacy: [place] [id number] 10/15/20 4/13/21 Yes Provider, Historical   albuterol (PROVENTIL HFA, VENTOLIN HFA, PROAIR HFA) 90 mcg/actuation inhaler Take 2 Puffs by inhalation every four (4) hours as needed for Wheezing. 6/26/20  Yes Uziel Frost MD   cetirizine (ZyrTEC) 10 mg tablet Take 10 mg by mouth daily. Yes Provider, Historical   triamcinolone acetonide (KENALOG) 0.1 % topical cream APPLY TO AFFECTED AREA TWO (2) TIMES DAILY AS NEEDED FOR SKIN IRRITATION OR ITCHING. 4/3/20  Yes Uziel Frost MD   glucose blood VI test strips (PRODIGY NO CODING) strip Check BG daily as directed.  1/7/20  Yes Uziel Frost MD       Allergies   Allergen Reactions    Hydrocodone Hives     Past Medical History:   Diagnosis Date    [facility] 04/2019    Diabetes type 2, controlled ([facility]     Hypertension     Ill-defined condition     PE    Panic attack     Pneumonia     Psychiatric disorder     Pulmonary embolism ([facility] 2015    treated with oral antiocoagulants for 12 months    Severe obstructive sleep apnea 03/2020    Urticaria      Social History     Tobacco Use    Smoking status: Former Smoker     Packs/day: 0.25    Smokeless tobacco: Never Used   Substance Use Topics    Alcohol use: No     Comment: rarely       ROS   Pertinent items noted in HPI      Objective:     Patient-Reported Vitals 12/10/2020   Patient-Reported Weight -   Patient-Reported Pulse 60   Patient-Reported Temperature -   Patient-Reported Systolic  949   Patient-Reported Diastolic 89      General: alert, cooperative, no distress   Mental  status: normal mood, behavior, speech, dress, motor activity, and thought processes, able to follow commands   HENT: NCAT   Neck: no visualized mass   Resp: no respiratory distress   Neuro: no gross deficits   Skin: no discoloration or lesions of concern on visible areas   Psychiatric: normal affect, consistent with stated mood, no evidence of hallucinations       We discussed the expected course, resolution and complications of the diagnosis(es) in detail. Medication risks, benefits, costs, interactions, and alternatives were discussed as indicated. I advised her to contact the office if her condition worsens, changes or fails to improve as anticipated. She expressed understanding with the diagnosis(es) and plan. Jessica Joyce, who was evaluated through a patient-initiated, synchronous (real-time) audio-video encounter, and/or her healthcare decision maker, is aware that it is a billable service, with coverage as determined by her insurance carrier. She provided verbal consent to proceed: Yes, and patient identification was verified. It was conducted pursuant to the emergency declaration under the [address] [place] [address] authority and the [employer] and [employer] Act. A caregiver was present when appropriate. Ability to conduct physical exam was limited. I was in the office. The patient was at home.       Tyrese Xie MD

## 2021-01-26 NOTE — LETTER
1/26/2021 3:20 PM 
 
Ms. Carol Tao [address] To Whom It May Concern,  
 
Adrianneterri Marte is currently under my care at [facility]. Ms. Al Toledo may proceed with COVID-19 vaccination. Given her medical history, recommend that she be monitored for 30 minutes after vaccination. If there are any additional questions or concerns, please contact my office at [phone]. Sincerely, Ezekiel Hoffman MD

## 2021-01-28 ENCOUNTER — TELEPHONE (OUTPATIENT)
Dept: FAMILY MEDICINE CLINIC | Age: 41
End: 2021-01-28

## 2021-01-28 DIAGNOSIS — K04.7 TOOTH ABSCESS: ICD-10-CM

## 2021-01-28 NOTE — TELEPHONE ENCOUNTER
Patient had her prescription filled and lost it. Patient is requesting another prescription sent to the pharmacy.

## 2021-01-29 RX ORDER — AMOXICILLIN AND CLAVULANATE POTASSIUM 875; 125 MG/1; MG/1
1 TABLET, FILM COATED ORAL EVERY 12 HOURS
Qty: 20 TAB | Refills: 0 | Status: SHIPPED | OUTPATIENT
Start: 2021-01-29 | End: 2021-02-08

## 2021-01-29 NOTE — TELEPHONE ENCOUNTER
Pt called. Has misplaced prescription for Augmentin. New Rx sent to her pharmacy. Orders Placed This Encounter    amoxicillin-clavulanate (AUGMENTIN) 875-125 mg per tablet     Sig: Take 1 Tab by mouth every twelve (12) hours for 10 days.      Dispense:  20 Tab     Refill:  0

## 2021-02-08 ENCOUNTER — VIRTUAL VISIT (OUTPATIENT)
Dept: FAMILY MEDICINE CLINIC | Age: 41
End: 2021-02-08
Payer: MEDICAID

## 2021-02-08 DIAGNOSIS — M79.89 SWELLING OF LEFT HAND: Primary | ICD-10-CM

## 2021-02-08 PROCEDURE — 99212 OFFICE O/P EST SF 10 MIN: CPT | Performed by: FAMILY MEDICINE

## 2021-02-08 NOTE — PROGRESS NOTES
Cherie Goetz is a 45 y.o. female who was seen by synchronous (real-time) audio-video technology on 2/8/2021 for Hand Swelling    Assessment & Plan:   Diagnoses and all orders for this visit:    1. Swelling of left hand: for the last few days without reported injury or trauma. Has intermittent episodes with no identifiable etiology. Rheumatological evaluation has been negative to date. Does not appear to be infectious in etiology. At this time, etiology is not known. Consider Allergy/Immunology evaluation. 712  Subjective:   Left hand swelling: off and on for the past few days. Did notice swelling after receiving her COVID-19 vaccine which she reports having in the left arm. Yesterday had swelling in the right palm. States that her hands will swell to the point where she is unable to bend her fingers. Swelling is typically preceded by itching. Reports that her BP has been controlled. No other associated symptoms. Has had previous episodes which will last for about 3 days before self-resolving. Has been evaluated by Rheumatology at [facility]; labs have been normal.     Prior to Admission medications    Medication Sig Start Date End Date Taking? Authorizing Provider   amoxicillin-clavulanate (AUGMENTIN) 875-125 mg per tablet Take 1 Tab by mouth every twelve (12) hours for 10 days. 1/29/21 2/8/21 Yes Sumaya Tovar MD   atorvastatin (LIPITOR) 40 mg tablet Take 1 Tab by mouth daily. 1/11/21  Yes Sumaya Tovar MD   calcium citrate 200 mg (950 mg) tablet Take 600 mg by mouth two (2) times a day. Yes Provider, Historical   diclofenac (VOLTAREN) 1 % gel Apply 4 g to affected area four (4) times daily. 12/21/20  Yes Sumaya Tovar MD   acetaminophen (TylenoL) 325 mg tablet Take  by mouth every six (6) hours as needed for Pain. Yes Provider, Historical   lisinopriL (PRINIVIL, ZESTRIL) 40 mg tablet Take 1 Tab by mouth daily.  12/10/20  Yes Sumaya Tovar MD   multivitamin (ONE A DAY) tablet Take 1 Tab by mouth daily. Yes Provider, Historical   ferrous sulfate (Iron) 325 mg (65 mg iron) tablet Take 325 mg by mouth Daily (before breakfast). Yes Provider, Historical   cholecalciferol (Vitamin D3) (1000 Units /25 mcg) tablet Take 3,000 Units by mouth daily. Yes Provider, Historical   cyanocobalamin, vitamin B-12, (VITAMIN B12 PO) Take  by mouth. Yes Provider, Historical   fluticasone propionate (Flonase Allergy Relief) 50 mcg/actuation nasal spray 2 Sprays by Both Nostrils route daily. Yes Provider, Historical   Stool Softener 100 mg capsule Take 100 mg by mouth two (2) times a day. 10/15/20  Yes Provider, Historical   pantoprazole (PROTONIX) 40 mg tablet Take 40 mg by mouth Daily (before breakfast). 10/15/20  Yes Provider, Historical   ursodioL (ACTIGALL) 250 mg tablet 250 mg = 1 tab each dose, PO, bid, start on postop day 14 when cleared by surgeon, # 60 tab, 5 Refills, Earliest fill date: 10/22/20, Pharmacy: [place] [id number] 10/15/20 4/13/21 Yes Provider, Historical   albuterol (PROVENTIL HFA, VENTOLIN HFA, PROAIR HFA) 90 mcg/actuation inhaler Take 2 Puffs by inhalation every four (4) hours as needed for Wheezing. 6/26/20  Yes Carrie Healy MD   cetirizine (ZyrTEC) 10 mg tablet Take 10 mg by mouth daily. Yes Provider, Historical   triamcinolone acetonide (KENALOG) 0.1 % topical cream APPLY TO AFFECTED AREA TWO (2) TIMES DAILY AS NEEDED FOR SKIN IRRITATION OR ITCHING. 4/3/20  Yes Carrie Healy MD   glucose blood VI test strips (PRODIGY NO CODING) strip Check BG daily as directed.  1/7/20  Yes Carrie Healy MD       Allergies   Allergen Reactions    Hydrocodone Hives       Past Medical History:   Diagnosis Date    [facility] 04/2019    [facility]     Hypertension     Ill-defined condition     PE    Panic attack     Pneumonia     Psychiatric disorder     [facility] 2015    treated with oral antiocoagulants for 12 months    Severe obstructive sleep apnea 03/2020    Urticaria        Social History     Tobacco Use    Smoking status: Former Smoker     Packs/day: 0.25    Smokeless tobacco: Never Used   Substance Use Topics    Alcohol use: No     Comment: rarely       ROS   Pertinent items noted in HPI    Objective:     Patient-Reported Vitals 12/10/2020   Patient-Reported Weight -   Patient-Reported Pulse 60   Patient-Reported Temperature -   Patient-Reported Systolic  679   Patient-Reported Diastolic 89        General: alert, cooperative, no distress   Mental  status: normal mood, behavior, speech, dress, motor activity, and thought processes, able to follow commands   HENT: NCAT   Neck: no visualized mass   Resp: no respiratory distress   Neuro: no gross deficits   Skin: no discoloration or lesions of concern on visible areas   Psychiatric: normal affect, consistent with stated mood, no evidence of hallucinations       We discussed the expected course, resolution and complications of the diagnosis(es) in detail. Medication risks, benefits, costs, interactions, and alternatives were discussed as indicated. I advised her to contact the office if her condition worsens, changes or fails to improve as anticipated. She expressed understanding with the diagnosis(es) and plan. Zoeymonty Vargasen, who was evaluated through a patient-initiated, synchronous (real-time) audio-video encounter, and/or her healthcare decision maker, is aware that it is a billable service, with coverage as determined by her insurance carrier. She provided verbal consent to proceed: Yes, and patient identification was verified. It was conducted pursuant to the emergency declaration under the [facility], [address] [place] [address] authority and the [employer] Act. A caregiver was present when appropriate. Ability to conduct physical exam was limited. I was in the office.  The patient was at home.      Tyrese Xie MD

## 2021-03-12 ENCOUNTER — VIRTUAL VISIT (OUTPATIENT)
Dept: FAMILY MEDICINE CLINIC | Age: 41
End: 2021-03-12
Payer: MEDICAID

## 2021-03-12 DIAGNOSIS — N90.89 LABIAL LESION: ICD-10-CM

## 2021-03-12 DIAGNOSIS — L73.9 FOLLICULITIS OF AXILLA: Primary | ICD-10-CM

## 2021-03-12 PROCEDURE — 99212 OFFICE O/P EST SF 10 MIN: CPT | Performed by: FAMILY MEDICINE

## 2021-03-12 NOTE — PROGRESS NOTES
Rasheeda Corrigan is a 45 y.o. female who was seen by synchronous (real-time) audio-video technology on 3/12/2021 for Arm Pain (Onset a couple days ago patient stated she notices a lump which comes and goes after she shaves ) and Vaginal Discharge (Onset 1 week ago with some slight itching patient stated she also notices a small hard lump on the right labia)      Assessment & Plan:   Diagnoses and all orders for this visit:    1. Folliculitis of axilla: likely irritative due to shaving. Recommend that she stop shaving at this time, may try use of depilatory hair removal product. 2. Labial lesion: right sided, consider cyst given description. Anticipate that this will resolve in time. Recommend in-office evaluation if there is an increase in size, painful, or does not resolve. Pt verbalizes understanding and is in agreement with plan. 712  Subjective:   Reports having recurring pustules under the right axilla after shaving. Small in size and will resolve within a few days. Associated with mild discomfort. Has noticed a small sized lump of the right labia. First noticed about a week ago. Associated with mild itching. Denies vaginal discharge, pain. Has treated with Monistat which she stopped due to burning. Has taken Diflucan and admits to taking antibiotics she had with no improvement. No increase in size. Prior to Admission medications    Medication Sig Start Date End Date Taking? Authorizing Provider   atorvastatin (LIPITOR) 40 mg tablet Take 1 Tab by mouth daily. 1/11/21  Yes Venkatesh Rodarte MD   calcium citrate 200 mg (950 mg) tablet Take 600 mg by mouth two (2) times a day. Yes Provider, Historical   diclofenac (VOLTAREN) 1 % gel Apply 4 g to affected area four (4) times daily. 12/21/20  Yes Venkatesh Rodarte MD   acetaminophen (TylenoL) 325 mg tablet Take  by mouth every six (6) hours as needed for Pain.    Yes Provider, Historical   lisinopriL (PRINIVIL, ZESTRIL) 40 mg tablet Take 1 Tab by mouth daily. 12/10/20  Yes Mckinley Riedel, MD   multivitamin (ONE A DAY) tablet Take 1 Tab by mouth daily. Yes Provider, Historical   ferrous sulfate (Iron) 325 mg (65 mg iron) tablet Take 325 mg by mouth Daily (before breakfast). Yes Provider, Historical   cholecalciferol (Vitamin D3) (1000 Units /25 mcg) tablet Take 3,000 Units by mouth daily. Yes Provider, Historical   cyanocobalamin, vitamin B-12, (VITAMIN B12 PO) Take  by mouth. Yes Provider, Historical   Stool Softener 100 mg capsule Take 100 mg by mouth two (2) times a day. 10/15/20  Yes Provider, Historical   pantoprazole (PROTONIX) 40 mg tablet Take 40 mg by mouth Daily (before breakfast). 10/15/20  Yes Provider, Historical   ursodioL (ACTIGALL) 250 mg tablet 250 mg = 1 tab each dose, PO, bid, start on postop day 14 when cleared by surgeon, # 60 tab, 5 Refills, Earliest fill date: 10/22/20, Pharmacy: [facility] [id number] 10/15/20 4/13/21 Yes Provider, Historical   albuterol (PROVENTIL HFA, VENTOLIN HFA, PROAIR HFA) 90 mcg/actuation inhaler Take 2 Puffs by inhalation every four (4) hours as needed for Wheezing. 6/26/20  Yes Mckinley Riedel, MD   cetirizine (ZyrTEC) 10 mg tablet Take 10 mg by mouth daily. Yes Provider, Historical   triamcinolone acetonide (KENALOG) 0.1 % topical cream APPLY TO AFFECTED AREA TWO (2) TIMES DAILY AS NEEDED FOR SKIN IRRITATION OR ITCHING. 4/3/20  Yes Mckinley Riedel, MD   glucose blood VI test strips (PRODIGY NO CODING) strip Check BG daily as directed. 1/7/20  Yes Mckinley Riedel, MD   fluticasone propionate (Flonase Allergy Relief) 50 mcg/actuation nasal spray 2 Sprays by Both Nostrils route daily.     Provider, Historical       Allergies   Allergen Reactions    Hydrocodone Hives     Past Medical History:   Diagnosis Date    Diabetes ([facility] 04/2019    Diabetes type 2, controlled ([facility]     Hypertension     Ill-defined condition     PE    Panic attack     Pneumonia     Psychiatric disorder     Pulmonary embolism ([facility] 2015    treated with oral antiocoagulants for 12 months    Severe obstructive sleep apnea 03/2020    Urticaria      Social History     Tobacco Use    Smoking status: Former Smoker     Packs/day: 0.25    Smokeless tobacco: Never Used   Substance Use Topics    Alcohol use: No     Comment: rarely       ROS   Pertinent items noted in HPI    Objective:     Patient-Reported Vitals 3/12/2021   Patient-Reported Weight 228lbs   Patient-Reported Pulse -   Patient-Reported Temperature -   Patient-Reported Systolic  145   Patient-Reported Diastolic 78   Patient-Reported LMP 3/2/2021      General: alert, cooperative, no distress   Mental  status: normal mood, behavior, speech, dress, motor activity, and thought processes, able to follow commands   HENT: NCAT   Neck: no visualized mass   Resp: no respiratory distress   Neuro: no gross deficits   Skin: no discoloration or lesions of concern on visible areas   Psychiatric: normal affect, consistent with stated mood, no evidence of hallucinations       We discussed the expected course, resolution and complications of the diagnosis(es) in detail. Medication risks, benefits, costs, interactions, and alternatives were discussed as indicated. I advised her to contact the office if her condition worsens, changes or fails to improve as anticipated. She expressed understanding with the diagnosis(es) and plan. Rasheeda Corrigan, was evaluated through a synchronous (real-time) audio-video encounter. The patient (or guardian if applicable) is aware that this is a billable service. Verbal consent to proceed has been obtained within the past 12 months. The visit was conducted pursuant to the emergency declaration under the [facility], [address] [place] [address] authority and the [employer] General Act. Patient identification was verified, and a caregiver was present when appropriate.  The patient was located in a state where the provider was credentialed to provide care. I was in the office. The patient was at home.        Senait Castillo MD

## 2021-03-12 NOTE — PROGRESS NOTES
Preferred number [phone]   Pt verbally agrees to labs, orders, and others to be mailed to confirmed home address. Identified pt with two pt identifiers(name and ). Reviewed record in preparation for visit and have obtained necessary documentation. All patient medications has been reviewed. Chief Complaint   Patient presents with    Arm Pain     Onset a couple days ago patient stated she notices a lump which comes and goes after she shaves     Vaginal Discharge     Onset 1 week ago with some slight itching patient stated she also notices a small hard lump on the right labia       Health Maintenance Due   Topic    Hepatitis C Screening     COVID-19 Vaccine (1)     Health Maintenance Review: Patient reminded of \"due or due soon\" health maintenance. I have asked the patient to contact his/her primary care provider (PCP) for follow-up on his/her health maintenance. Wt Readings from Last 3 Encounters:   20 239 lb 12.8 oz (108.8 kg)   20 265 lb 9.6 oz (120.5 kg)   20 268 lb 4.8 oz (121.7 kg)     Temp Readings from Last 3 Encounters:   20 98.4 °F (36.9 °C) (Oral)   20 (!) 96.4 °F (35.8 °C) (Oral)   20 98.1 °F (36.7 °C)     BP Readings from Last 3 Encounters:   20 120/82   20 126/78   20 135/65     Pulse Readings from Last 3 Encounters:   20 69   20 70   20 76         Coordination of Care Questionnaire:   1) Have you been to an emergency room, urgent care, or hospitalized since your last visit? No  2. Have seen or consulted any other health care provider since your last visit?  No

## 2021-05-07 DIAGNOSIS — R21 PAPULAR RASH: ICD-10-CM

## 2021-05-14 ENCOUNTER — VIRTUAL VISIT (OUTPATIENT)
Dept: FAMILY MEDICINE CLINIC | Age: 41
End: 2021-05-14
Payer: MEDICAID

## 2021-05-14 DIAGNOSIS — N76.4 VULVAR ABSCESS: Primary | ICD-10-CM

## 2021-05-14 PROCEDURE — 99213 OFFICE O/P EST LOW 20 MIN: CPT | Performed by: FAMILY MEDICINE

## 2021-05-14 RX ORDER — SULFAMETHOXAZOLE AND TRIMETHOPRIM 800; 160 MG/1; MG/1
1 TABLET ORAL 2 TIMES DAILY
Qty: 14 TAB | Refills: 0 | Status: SHIPPED | OUTPATIENT
Start: 2021-05-14 | End: 2021-05-21

## 2021-05-14 RX ORDER — URSODIOL 250 MG/1
TABLET, FILM COATED ORAL
COMMUNITY
Start: 2021-05-07 | End: 2022-03-28

## 2021-05-14 RX ORDER — TRIAMCINOLONE ACETONIDE 1 MG/G
CREAM TOPICAL
Qty: 30 G | Refills: 5 | Status: SHIPPED | OUTPATIENT
Start: 2021-05-14

## 2021-05-14 NOTE — PROGRESS NOTES
Shane Garcia is a 45 y.o. female who was seen by synchronous (real-time) audio-video technology on 5/14/2021 for Vaginal Pain (cyst)      Assessment & Plan:   Diagnoses and all orders for this visit:    1. Vulvar abscess: with concern of based upon history. Treat with warm compresses and Bactrim. Recommend in office evaluation if symptoms worsen or fail to improve as expected. -     trimethoprim-sulfamethoxazole (BACTRIM DS, SEPTRA DS) 160-800 mg per tablet; Take 1 Tab by mouth two (2) times a day for 7 days. - Warm compresses to area     Subjective:   Concern for cyst of the vagina. First noticed about a week ago and has been increasing in size. About the size of a nickel and is painful. No drainage noted. Has had previously. No evaluation to date. Prior to Admission medications    Medication Sig Start Date End Date Taking? Authorizing Provider   triamcinolone acetonide (KENALOG) 0.1 % topical cream APPLY TO AFFECTED AREA TWO (2) TIMES DAILY AS NEEDED FOR SKIN IRRITATION OR ITCHING. 5/14/21  Yes Suzie Garcia MD   ursodioL (ACTIGALL) 250 mg tablet TAKE 1 TABLET BY MOUTH TWICE DAILY FOR 30 DAYS-START ON POSTOP DAY 14 WHEN CLEARED BY SURGEON 5/7/21  Yes Provider, Historical   atorvastatin (LIPITOR) 40 mg tablet Take 1 Tab by mouth daily. 1/11/21  Yes Suzie Garcia MD   calcium citrate 200 mg (950 mg) tablet Take 600 mg by mouth two (2) times a day. Yes Provider, Historical   diclofenac (VOLTAREN) 1 % gel Apply 4 g to affected area four (4) times daily. 12/21/20  Yes Suzie Garcia MD   acetaminophen (TylenoL) 325 mg tablet Take  by mouth every six (6) hours as needed for Pain. Yes Provider, Historical   lisinopriL (PRINIVIL, ZESTRIL) 40 mg tablet Take 1 Tab by mouth daily. 12/10/20  Yes Suzie Garcia MD   multivitamin (ONE A DAY) tablet Take 1 Tab by mouth daily.    Yes Provider, Historical   ferrous sulfate (Iron) 325 mg (65 mg iron) tablet Take 325 mg by mouth Daily (before breakfast). Yes Provider, Historical   cholecalciferol (Vitamin D3) (1000 Units /25 mcg) tablet Take 3,000 Units by mouth daily. Yes Provider, Historical   cyanocobalamin, vitamin B-12, (VITAMIN B12 PO) Take  by mouth. Yes Provider, Historical   fluticasone propionate (Flonase Allergy Relief) 50 mcg/actuation nasal spray 2 Sprays by Both Nostrils route daily. Yes Provider, Historical   Stool Softener 100 mg capsule Take 100 mg by mouth two (2) times a day. 10/15/20  Yes Provider, Historical   pantoprazole (PROTONIX) 40 mg tablet Take 40 mg by mouth Daily (before breakfast). 10/15/20  Yes Provider, Historical   albuterol (PROVENTIL HFA, VENTOLIN HFA, PROAIR HFA) 90 mcg/actuation inhaler Take 2 Puffs by inhalation every four (4) hours as needed for Wheezing. 6/26/20  Yes Bebe Corrales MD   cetirizine (ZyrTEC) 10 mg tablet Take 10 mg by mouth daily. Yes Provider, Historical   triamcinolone acetonide (KENALOG) 0.1 % topical cream APPLY TO AFFECTED AREA TWO (2) TIMES DAILY AS NEEDED FOR SKIN IRRITATION OR ITCHING. 4/3/20 5/14/21 Yes Bebe Corrales MD   glucose blood VI test strips (PRODIGY NO CODING) strip Check BG daily as directed.  1/7/20  Yes Bebe Corrales MD     Allergies   Allergen Reactions    Hydrocodone Hives     Past Medical History:   Diagnosis Date    Diabetes ([facility] 04/2019    Diabetes type 2, controlled ([facility]     Hypertension     Ill-defined condition     PE    Panic attack     Pneumonia     Psychiatric disorder     Pulmonary embolism ([facility] [address] [facility] 2015    treated with oral antiocoagulants for 12 months    Severe obstructive sleep apnea 03/2020    Urticaria      Social History     Tobacco Use    Smoking status: Former Smoker     Packs/day: 0.25    Smokeless tobacco: Never Used   Substance Use Topics    Alcohol use: No     Comment: rarely       ROS   Pertinent items noted in HPI    Objective:     Patient-Reported Vitals 5/14/2021   Patient-Reported Weight -   Patient-Reported Pulse -   Patient-Reported Temperature 99.5   Patient-Reported Systolic  967   Patient-Reported Diastolic 73   Patient-Reported LMP -      General: alert, cooperative, no distress   Mental  status: normal mood, behavior, speech, dress, motor activity, and thought processes, able to follow commands   HENT: NCAT   Neck: no visualized mass   Resp: no respiratory distress   Neuro: no gross deficits   Skin: no discoloration or lesions of concern on visible areas   Psychiatric: normal affect, consistent with stated mood, no evidence of hallucinations       We discussed the expected course, resolution and complications of the diagnosis(es) in detail. Medication risks, benefits, costs, interactions, and alternatives were discussed as indicated. I advised her to contact the office if her condition worsens, changes or fails to improve as anticipated. She expressed understanding with the diagnosis(es) and plan. Bradford Mckeon, was evaluated through a synchronous (real-time) audio-video encounter. The patient (or guardian if applicable) is aware that this is a billable service. Verbal consent to proceed has been obtained within the past 12 months. The visit was conducted pursuant to the emergency declaration under the [facility], [address] [place] [address] authority and the [employer] General Act. Patient identification was verified, and a caregiver was present when appropriate. The patient was located in a state where the provider was credentialed to provide care. I was in the office. The patient was at home.          Dami Lorenzana MD

## 2021-05-14 NOTE — PROGRESS NOTES
Pt is being seen today for a cyst in her vagina. Pt states this has happened before but this time it is bigger and even more painful. Pt states it appeared about 5-6 days ago but over the last 2-3 days has increased in size. VV-Pt is aware of billable appt depending on insurance plan. Preferred number [phone]    Pt verbally agrees to labs, orders, and others to be mailed to confirmed home address. Identified pt with two pt identifiers(name and ). Reviewed record in preparation for visit and have obtained necessary documentation. All patient medications has been reviewed. Chief Complaint   Patient presents with    Vaginal Pain     cyst       Health Maintenance Review: Patient reminded of \"due or due soon\" health maintenance. I have asked the patient to contact his/her primary care provider (PCP) for follow-up on his/her health maintenance. Patient-Reported Vitals 2021   Patient-Reported Weight -   Patient-Reported Pulse -   Patient-Reported Temperature 99.5   Patient-Reported Systolic  164   Patient-Reported Diastolic 73   Patient-Reported LMP -        Wt Readings from Last 3 Encounters:   20 239 lb 12.8 oz (108.8 kg)   20 265 lb 9.6 oz (120.5 kg)   20 268 lb 4.8 oz (121.7 kg)     Temp Readings from Last 3 Encounters:   20 98.4 °F (36.9 °C) (Oral)   20 (!) 96.4 °F (35.8 °C) (Oral)   20 98.1 °F (36.7 °C)     BP Readings from Last 3 Encounters:   20 120/82   20 126/78   20 135/65     Pulse Readings from Last 3 Encounters:   20 69   20 70   20 76         Coordination of Care Questionnaire:   1) Have you been to an emergency room, urgent care, or hospitalized since your last visit? No    2. Have seen or consulted any other health care provider since your last visit?  No

## 2021-06-01 DIAGNOSIS — I10 ESSENTIAL HYPERTENSION: ICD-10-CM

## 2021-06-01 RX ORDER — LISINOPRIL 40 MG/1
TABLET ORAL
Qty: 90 TABLET | Refills: 1 | Status: SHIPPED | OUTPATIENT
Start: 2021-06-01 | End: 2021-12-13 | Stop reason: SDUPTHER

## 2021-09-01 RX ORDER — ALBUTEROL SULFATE 90 UG/1
2 AEROSOL, METERED RESPIRATORY (INHALATION)
Qty: 6.7 G | Refills: 0 | Status: SHIPPED | OUTPATIENT
Start: 2021-09-01

## 2021-09-08 ENCOUNTER — TELEPHONE (OUTPATIENT)
Dept: FAMILY MEDICINE CLINIC | Age: 41
End: 2021-09-08

## 2021-09-08 NOTE — TELEPHONE ENCOUNTER
----- Message from Beatrice Chowdary sent at 9/8/2021 10:24 AM EDT -----  Regarding: ANTHONY Benoit/ Telephone  Appointment not available    Caller's first and last name and relationship to patient (if not the patient): NA      Best contact number:  [phone]      Preferred date and time: Within week before upcoming appointment on 09/24/21. Scheduled appointment date and time: 9/24/21 @ 03:30 PM.      Reason for appointment: Labs for upcoming 09/24/21 appointment. Details to clarify the request: Patient requesting lab appointment prior to 09/24/21 appointment and needs to know if she needs to fast to have the labs completed. Patient also advising she is approaching 31 and needs to know more about mammogram process.        Beatrice Chowdary

## 2021-09-24 ENCOUNTER — OFFICE VISIT (OUTPATIENT)
Dept: FAMILY MEDICINE CLINIC | Age: 41
End: 2021-09-24
Payer: MEDICAID

## 2021-09-24 VITALS
HEART RATE: 68 BPM | TEMPERATURE: 97.8 F | WEIGHT: 229 LBS | OXYGEN SATURATION: 98 % | RESPIRATION RATE: 18 BRPM | SYSTOLIC BLOOD PRESSURE: 132 MMHG | DIASTOLIC BLOOD PRESSURE: 80 MMHG | BODY MASS INDEX: 40.57 KG/M2 | HEIGHT: 63 IN

## 2021-09-24 DIAGNOSIS — I10 ESSENTIAL HYPERTENSION: Primary | ICD-10-CM

## 2021-09-24 DIAGNOSIS — N75.0 BARTHOLIN CYST: ICD-10-CM

## 2021-09-24 DIAGNOSIS — E11.9 CONTROLLED TYPE 2 DIABETES MELLITUS WITHOUT COMPLICATION, WITHOUT LONG-TERM CURRENT USE OF INSULIN (HCC): ICD-10-CM

## 2021-09-24 DIAGNOSIS — Z13.220 SCREENING FOR CHOLESTEROL LEVEL: ICD-10-CM

## 2021-09-24 DIAGNOSIS — Z11.59 ENCOUNTER FOR HEPATITIS C SCREENING TEST FOR LOW RISK PATIENT: ICD-10-CM

## 2021-09-24 DIAGNOSIS — Z23 NEEDS FLU SHOT: ICD-10-CM

## 2021-09-24 DIAGNOSIS — R60.9 SWELLING: ICD-10-CM

## 2021-09-24 LAB
ALBUMIN SERPL-MCNC: 3.4 G/DL (ref 3.5–5)
ALBUMIN/GLOB SERPL: 0.9 {RATIO} (ref 1.1–2.2)
ALP SERPL-CCNC: 62 U/L (ref 45–117)
ALT SERPL-CCNC: 16 U/L (ref 12–78)
ANION GAP SERPL CALC-SCNC: 5 MMOL/L (ref 5–15)
AST SERPL-CCNC: 10 U/L (ref 15–37)
BILIRUB SERPL-MCNC: 0.5 MG/DL (ref 0.2–1)
BUN SERPL-MCNC: 13 MG/DL (ref 6–20)
BUN/CREAT SERPL: 19 (ref 12–20)
CALCIUM SERPL-MCNC: 8.6 MG/DL (ref 8.5–10.1)
CHLORIDE SERPL-SCNC: 110 MMOL/L (ref 97–108)
CHOLEST SERPL-MCNC: 114 MG/DL
CO2 SERPL-SCNC: 26 MMOL/L (ref 21–32)
CREAT SERPL-MCNC: 0.67 MG/DL (ref 0.55–1.02)
CREAT UR-MCNC: 190 MG/DL
ERYTHROCYTE [DISTWIDTH] IN BLOOD BY AUTOMATED COUNT: 14.4 % (ref 11.5–14.5)
EST. AVERAGE GLUCOSE BLD GHB EST-MCNC: 103 MG/DL
GLOBULIN SER CALC-MCNC: 3.8 G/DL (ref 2–4)
GLUCOSE SERPL-MCNC: 81 MG/DL (ref 65–100)
HBA1C MFR BLD: 5.2 % (ref 4–5.6)
HCT VFR BLD AUTO: 41.7 % (ref 35–47)
HCV AB SERPL QL IA: NONREACTIVE
HDLC SERPL-MCNC: 44 MG/DL
HDLC SERPL: 2.6 {RATIO} (ref 0–5)
HGB BLD-MCNC: 12.9 G/DL (ref 11.5–16)
LDLC SERPL CALC-MCNC: 49.4 MG/DL (ref 0–100)
MCH RBC QN AUTO: 25.4 PG (ref 26–34)
MCHC RBC AUTO-ENTMCNC: 30.9 G/DL (ref 30–36.5)
MCV RBC AUTO: 82.1 FL (ref 80–99)
MICROALBUMIN UR-MCNC: 27.9 MG/DL
MICROALBUMIN/CREAT UR-RTO: 147 MG/G (ref 0–30)
NRBC # BLD: 0 K/UL (ref 0–0.01)
NRBC BLD-RTO: 0 PER 100 WBC
PLATELET # BLD AUTO: 292 K/UL (ref 150–400)
PMV BLD AUTO: 10.4 FL (ref 8.9–12.9)
POTASSIUM SERPL-SCNC: 4.1 MMOL/L (ref 3.5–5.1)
PROT SERPL-MCNC: 7.2 G/DL (ref 6.4–8.2)
RBC # BLD AUTO: 5.08 M/UL (ref 3.8–5.2)
SODIUM SERPL-SCNC: 141 MMOL/L (ref 136–145)
TRIGL SERPL-MCNC: 103 MG/DL (ref ?–150)
VLDLC SERPL CALC-MCNC: 20.6 MG/DL
WBC # BLD AUTO: 6.7 K/UL (ref 3.6–11)

## 2021-09-24 PROCEDURE — 90471 IMMUNIZATION ADMIN: CPT | Performed by: NURSE PRACTITIONER

## 2021-09-24 PROCEDURE — 99213 OFFICE O/P EST LOW 20 MIN: CPT | Performed by: NURSE PRACTITIONER

## 2021-09-24 PROCEDURE — 90686 IIV4 VACC NO PRSV 0.5 ML IM: CPT | Performed by: NURSE PRACTITIONER

## 2021-09-24 RX ORDER — SULFAMETHOXAZOLE AND TRIMETHOPRIM 800; 160 MG/1; MG/1
TABLET ORAL
COMMUNITY
Start: 2021-09-22 | End: 2022-03-28

## 2021-09-24 RX ORDER — CEPHALEXIN 500 MG/1
CAPSULE ORAL
COMMUNITY
Start: 2021-09-22 | End: 2022-03-28 | Stop reason: ALTCHOICE

## 2021-09-24 NOTE — PATIENT INSTRUCTIONS
Vaccine Information Statement    Influenza (Flu) Vaccine (Inactivated or Recombinant): What You Need to Know    Many vaccine information statements are available in Japanese and other languages. See www.immunize.org/vis. Hojas de información sobre vacunas están disponibles en español y en muchos otros idiomas. Visite www.immunize.org/vis. 1. Why get vaccinated? Influenza vaccine can prevent influenza (flu). Flu is a contagious disease that spreads around the [facility] every year, usually between October and May. Anyone can get the flu, but it is more dangerous for some people. Infants and young children, people 72 years and older, pregnant people, and people with certain health conditions or a weakened immune system are at greatest risk of flu complications. Pneumonia, bronchitis, sinus infections, and ear infections are examples of flu-related complications. If you have a medical condition, such as heart disease, cancer, or diabetes, flu can make it worse. Flu can cause fever and chills, sore throat, muscle aches, fatigue, cough, headache, and runny or stuffy nose. Some people may have vomiting and diarrhea, though this is more common in children than adults. In an average year, thousands of people in the [facility] die from flu, and many more are hospitalized. Flu vaccine prevents millions of illnesses and flu-related visits to the doctor each year. 2. Influenza vaccines     CDC recommends everyone 6 months and older get vaccinated every flu season. Children 6 months through 6years of age may need 2 doses during a single flu season. Everyone else needs only 1 dose each flu season. It takes about 2 weeks for protection to develop after vaccination. There are many flu viruses, and they are always changing. Each year a new flu vaccine is made to protect against the influenza viruses believed to be likely to cause disease in the upcoming flu season.  Even when the vaccine doesnt exactly match these viruses, it may still provide some protection. Influenza vaccine does not cause flu. Influenza vaccine may be given at the same time as other vaccines. 3. Talk with your health care provider    Tell your vaccination provider if the person getting the vaccine:   Has had an allergic reaction after a previous dose of influenza vaccine, or has any severe, life-threatening allergies    Has ever had Guillain-Barré Syndrome (also called GBS)    In some cases, your health care provider may decide to postpone influenza vaccination until a future visit. Influenza vaccine can be administered at any time during pregnancy. People who are or will be pregnant during influenza season should receive inactivated influenza vaccine. People with minor illnesses, such as a cold, may be vaccinated. People who are moderately or severely ill should usually wait until they recover before getting influenza vaccine. Your health care provider can give you more information. 4. Risks of a vaccine reaction     Soreness, redness, and swelling where the shot is given, fever, muscle aches, and headache can happen after influenza vaccination.  There may be a very small increased risk of Guillain-Barré Syndrome (GBS) after inactivated influenza vaccine (the flu shot). Venkat  children who get the flu shot along with pneumococcal vaccine (PCV13) and/or DTaP vaccine at the same time might be slightly more likely to have a seizure caused by fever. Tell your health care provider if a child who is getting flu vaccine has ever had a seizure. People sometimes faint after medical procedures, including vaccination. Tell your provider if you feel dizzy or have vision changes or ringing in the ears. As with any medicine, there is a very remote chance of a vaccine causing a severe allergic reaction, other serious injury, or death. 5. What if there is a serious problem?     An allergic reaction could occur after the vaccinated person leaves the clinic. If you see signs of a severe allergic reaction (hives, swelling of the face and throat, difficulty breathing, a fast heartbeat, dizziness, or weakness), call 9-1-1 and get the person to the nearest hospital.    For other signs that concern you, call your health care provider. Adverse reactions should be reported to the Vaccine Adverse Event Reporting System (VAERS). Your health care provider will usually file this report, or you can do it yourself. Visit the VAERS website at www.vaers. [facility].gov or call 7-515.879.5598. VAERS is only for reporting reactions, and VAERS staff members do not give medical advice. 6. The National Vaccine Injury Compensation Program    The [facility] Vaccine Injury Compensation Program (VICP) is a federal program that was created to compensate people who may have been injured by certain vaccines. Claims regarding alleged injury or death due to vaccination have a time limit for filing, which may be as short as two years. Visit the VICP website at www.[facility].gov/vaccinecompensation or call 3-408.432.4685 to learn about the program and about filing a claim. 7. How can I learn more?  Ask your health care provider.  Call your local or state health department.  Visit the website of the Food and Drug Administration (FDA) for vaccine package inserts and additional information at www.fda.gov/vaccines-blood-biologics/vaccines.  Contact the Centers for Disease Control and Prevention (CDC):  - Call 5-148.189.2885 (1-800-CDC-INFO) or  - Visit CDCs influenza website at www.cdc.gov/flu. Vaccine Information Statement   Inactivated Influenza Vaccine   8/6/2021  [address] [id number]   Department of Health and Human Services  Centers for Disease Control and Prevention    Office Use Only

## 2021-09-24 NOTE — PROGRESS NOTES
Identified pt with two pt identifiers(name and ). Chief Complaint   Patient presents with    Hypertension    Diabetes    Labs     fasting    Swelling     hands and feet off and onn for 6 months        Health Maintenance Due   Topic    Hepatitis C Screening     MICROALBUMIN Q1     Lipid Screen     Foot Exam Q1     Flu Vaccine (1)       Wt Readings from Last 3 Encounters:   21 229 lb (103.9 kg)   20 239 lb 12.8 oz (108.8 kg)   20 265 lb 9.6 oz (120.5 kg)     Temp Readings from Last 3 Encounters:   21 97.8 °F (36.6 °C) (Oral)   20 98.4 °F (36.9 °C) (Oral)   20 (!) 96.4 °F (35.8 °C) (Oral)     BP Readings from Last 3 Encounters:   21 132/80   20 120/82   20 126/78     Pulse Readings from Last 3 Encounters:   21 68   20 69   20 70         Learning Assessment:  :     Learning Assessment 2019   PRIMARY LEARNER Patient   PRIMARY LANGUAGE ENGLISH   LEARNER PREFERENCE PRIMARY DEMONSTRATION   ANSWERED BY self   RELATIONSHIP SELF       Depression Screening:  :     3 most recent PHQ Screens 2021   Little interest or pleasure in doing things Not at all   Feeling down, depressed, irritable, or hopeless Not at all   Total Score PHQ 2 0       Fall Risk Assessment:  :     No flowsheet data found. Abuse Screening:  :     Abuse Screening Questionnaire 2021 3/12/2021 2020 2019   Do you ever feel afraid of your partner? N N N N   Are you in a relationship with someone who physically or mentally threatens you? N N N N   Is it safe for you to go home? Tennille Morrell       Coordination of Care Questionnaire:  :     1) Have you been to an emergency room, urgent care clinic since your last visit?  yes [facility] ER 21 cyst  Hospitalized since your last visit? no             2) Have you seen or consulted any other health care providers outside of [address] [place] [address] since your last visit? no  (Include any pap smears or colon screenings in this section.)    3) Do you have an Advance Directive on file? no  Are you interested in receiving information about Advance Directives? no    Reviewed record in preparation for visit and have obtained necessary documentation.

## 2021-09-24 NOTE — PROGRESS NOTES
Subjective:     Jose Smith is a 45 y.o. female who presents for follow up of diabetes and hypertension. Diet and Lifestyle: generally follows a low fat low cholesterol diet  Home BP Monitoring: is not measured at home    Cardiovascular ROS: taking medications as instructed, no medication side effects noted, no TIA's, no chest pain on exertion, no dyspnea on exertion, no swelling of ankles. New concerns: Pt is here for follow up and fasting labs. She needs referral to OBGYN for recurrent bartholin's cyst.  She was hospitalized for this, and is on antibiotics at this time. In addition, she continues to have swelling in hands and feet at times. She has been seen by rheum for this, with no conclusion  She states that the swelling comes and goes, and she has never been able to find a cause  This has been going on for 6-8 months. Patient Active Problem List    Diagnosis Date Noted    Hypertension     Severe obstructive sleep apnea 03/2020    Obesity, morbid ([facility] 01/16/2020    Diabetes ([facility] [address] 04/2019    Panic attack 10/16/2015    Urticaria 10/16/2015    Tobacco abuse 10/16/2015    History of pulmonary embolism 10/16/2015     Current Outpatient Medications   Medication Sig Dispense Refill    cephALEXin (KEFLEX) 500 mg capsule TAKE 1 TABLET BY MOUTH FOUR TIMES DAILY FOR 5 DAYS      trimethoprim-sulfamethoxazole (BACTRIM DS, SEPTRA DS) 160-800 mg per tablet TAKE 1 TABLET BY MOUTH TWICE DAILY FOR 5 DAYS      albuterol (PROVENTIL HFA, VENTOLIN HFA, PROAIR HFA) 90 mcg/actuation inhaler Take 2 Puffs by inhalation every four (4) hours as needed for Wheezing. 6.7 g 0    lisinopriL (PRINIVIL, ZESTRIL) 40 mg tablet TAKE 1 TABLET BY MOUTH DAILY 90 Tablet 1    triamcinolone acetonide (KENALOG) 0.1 % topical cream APPLY TO AFFECTED AREA TWO (2) TIMES DAILY AS NEEDED FOR SKIN IRRITATION OR ITCHING.  30 g 5    ursodioL (ACTIGALL) 250 mg tablet TAKE 1 TABLET BY MOUTH TWICE DAILY FOR 30 DAYS-START ON POSTOP DAY 14 WHEN CLEARED BY SURGEON      atorvastatin (LIPITOR) 40 mg tablet Take 1 Tab by mouth daily. 90 Tab 3    calcium citrate 200 mg (950 mg) tablet Take 600 mg by mouth two (2) times a day.  diclofenac (VOLTAREN) 1 % gel Apply 4 g to affected area four (4) times daily. 2 Each 2    acetaminophen (TylenoL) 325 mg tablet Take  by mouth every six (6) hours as needed for Pain.  multivitamin (ONE A DAY) tablet Take 1 Tab by mouth daily.  ferrous sulfate (Iron) 325 mg (65 mg iron) tablet Take 325 mg by mouth Daily (before breakfast).  cholecalciferol (Vitamin D3) (1000 Units /25 mcg) tablet Take 3,000 Units by mouth daily.  cyanocobalamin, vitamin B-12, (VITAMIN B12 PO) Take  by mouth.  fluticasone propionate (Flonase Allergy Relief) 50 mcg/actuation nasal spray 2 Sprays by Both Nostrils route daily.  Stool Softener 100 mg capsule Take 100 mg by mouth two (2) times a day.  pantoprazole (PROTONIX) 40 mg tablet Take 40 mg by mouth Daily (before breakfast).  cetirizine (ZyrTEC) 10 mg tablet Take 10 mg by mouth daily.  glucose blood VI test strips (PRODIGY NO CODING) strip Check BG daily as directed.  50 Strip 11     Allergies   Allergen Reactions    Hydrocodone Hives     Past Medical History:   Diagnosis Date    Diabetes ([facility] 04/2019    Diabetes type 2, controlled ([facility] [address] [facility]     Hypertension     Ill-defined condition     PE    Panic attack     Pneumonia     Psychiatric disorder     Pulmonary embolism ([facility] [address] 2015    treated with oral antiocoagulants for 12 months    Severe obstructive sleep apnea 03/2020    Urticaria      Past Surgical History:   Procedure Laterality Date    HX COLONOSCOPY      HX DILATION AND CURETTAGE      HX GASTRECTOMY  10/15/2020    Laparoscopic Sleeve Gastrectomy, Dr. Jolly Hernandez ([place] [address])     Family History   Problem Relation Age of Onset    Stroke Other     Hypertension Mother     No Known Problems Father      Social History     Tobacco Use    Smoking status: Former Smoker     Packs/day: 0.25    Smokeless tobacco: Never Used   Substance Use Topics    Alcohol use: No     Comment: rarely        Lab Results   Component Value Date/Time    WBC 7.7 09/20/2020 11:16 AM    HGB 12.5 09/20/2020 11:16 AM    HCT 41.7 09/20/2020 11:16 AM    PLATELET 541 11/97/4079 11:16 AM    MCV 81.4 09/20/2020 11:16 AM     Lab Results   Component Value Date/Time    Hemoglobin A1c 5.0 12/21/2020 04:11 PM    Hemoglobin A1c 5.5 06/12/2020 11:41 AM    Hemoglobin A1c 5.6 01/16/2020 04:09 PM    Glucose 93 12/21/2020 04:11 PM    Glucose (POC) 93 10/17/2015 11:19 AM    Microalbumin/Creat ratio (mg/g creat) 94 (H) 06/12/2020 11:41 AM    Microalbumin,urine random 8.06 06/12/2020 11:41 AM    LDL, calculated 65.8 06/12/2020 11:41 AM    Creatinine 0.59 12/21/2020 04:11 PM      Lab Results   Component Value Date/Time    Cholesterol, total 137 06/12/2020 11:41 AM    HDL Cholesterol 39 06/12/2020 11:41 AM    LDL, calculated 65.8 06/12/2020 11:41 AM    Triglyceride 161 (H) 06/12/2020 11:41 AM    CHOL/HDL Ratio 3.5 06/12/2020 11:41 AM     Lab Results   Component Value Date/Time    GFR est non-AA >60 12/21/2020 04:11 PM    GFR est AA >60 12/21/2020 04:11 PM    Creatinine 0.59 12/21/2020 04:11 PM    BUN 19 12/21/2020 04:11 PM    Sodium 141 12/21/2020 04:11 PM    Potassium 4.6 12/21/2020 04:11 PM    Chloride 110 (H) 12/21/2020 04:11 PM    CO2 27 12/21/2020 04:11 PM    Magnesium 2.1 10/17/2015 07:10 AM    Phosphorus 3.4 10/17/2015 07:10 AM        Review of Systems, additional:  Pertinent items are noted in HPI. Objective:     Visit Vitals  /80 (BP 1 Location: Right arm, BP Patient Position: Sitting, BP Cuff Size: Adult)   Pulse 68   Temp 97.8 °F (36.6 °C) (Oral)   Resp 18   Ht 5' 3\" (1.6 m)   Wt 229 lb (103.9 kg)   LMP 09/22/2021 (Exact Date)   SpO2 98%   BMI 40.57 kg/m²     Appearance: alert, well appearing, and in no distress.   General exam: CVS exam BP noted to be well controlled today in office, S1, S2 normal, no gallop, no murmur, chest clear, no JVD, no HSM, no edema. Lab review: orders written for new lab studies as appropriate; see orders. Assessment/Plan:     diabetes stable, hypertension stable. current treatment plan is effective, no change in therapy. ICD-10-CM ICD-9-CM    1. Essential hypertension  I10 401.9 CBC W/O DIFF      METABOLIC PANEL, COMPREHENSIVE   2. Controlled type 2 diabetes mellitus without complication, without long-term current use of insulin (HCC)  E11.9 250.00 MICROALBUMIN, UR, RAND W/ MICROALB/CREAT RATIO      HEMOGLOBIN A1C WITH EAG   3. Screening for cholesterol level  Z13.220 V77.91 LIPID PANEL   4. Bartholin cyst  N75.0 616.2 REFERRAL TO OBSTETRICS AND GYNECOLOGY   5. Encounter for hepatitis C screening test for low risk patient  Z11.59 V73.89 HEPATITIS C AB   6. Swelling  R60.9 782.3 REFERRAL TO ALLERGY   7. Needs flu shot  Z23 V04.81 INFLUENZA VIRUS VAC QUAD,SPLIT,PRESV FREE SYRINGE IM     Will notify when labs return, and inform her of any change in plan of care at that time  Vaccine and VIS given  Should call OBGYN and Allergy for appointment ASAP    Pt informed to return to office with worsening of symptoms, or PRN with any questions or concerns. Pt verbalizes understanding of plan of care and denies further questions or concerns at this time.

## 2021-09-27 NOTE — PROGRESS NOTES
Please call patient and let her know that her labs returned and are stable.   Should return for follow up and fasting labs in 6 months  Thanks

## 2021-09-28 ENCOUNTER — TELEPHONE (OUTPATIENT)
Dept: FAMILY MEDICINE CLINIC | Age: 41
End: 2021-09-28

## 2021-09-28 NOTE — TELEPHONE ENCOUNTER
----- Message from Karl Brothers NP sent at 9/27/2021  7:41 AM EDT -----  Please call patient and let her know that her labs returned and are stable.   Should return for follow up and fasting labs in 6 months  Thanks

## 2021-12-01 ENCOUNTER — NURSE TRIAGE (OUTPATIENT)
Dept: OTHER | Facility: CLINIC | Age: 41
End: 2021-12-01

## 2021-12-07 ENCOUNTER — OFFICE VISIT (OUTPATIENT)
Dept: FAMILY MEDICINE CLINIC | Age: 41
End: 2021-12-07
Payer: MEDICAID

## 2021-12-07 VITALS
SYSTOLIC BLOOD PRESSURE: 132 MMHG | OXYGEN SATURATION: 98 % | HEIGHT: 63 IN | BODY MASS INDEX: 40.57 KG/M2 | DIASTOLIC BLOOD PRESSURE: 88 MMHG | RESPIRATION RATE: 16 BRPM | TEMPERATURE: 97.8 F | WEIGHT: 229 LBS | HEART RATE: 64 BPM

## 2021-12-07 DIAGNOSIS — M54.6 CHRONIC BILATERAL THORACIC BACK PAIN: ICD-10-CM

## 2021-12-07 DIAGNOSIS — G89.29 CHRONIC BILATERAL THORACIC BACK PAIN: ICD-10-CM

## 2021-12-07 DIAGNOSIS — F41.0 PANIC ATTACKS: Primary | ICD-10-CM

## 2021-12-07 PROCEDURE — 99213 OFFICE O/P EST LOW 20 MIN: CPT | Performed by: NURSE PRACTITIONER

## 2021-12-07 RX ORDER — BUSPIRONE HYDROCHLORIDE 10 MG/1
10 TABLET ORAL 3 TIMES DAILY
Qty: 30 TABLET | Refills: 2 | Status: SHIPPED | OUTPATIENT
Start: 2021-12-07 | End: 2022-03-28 | Stop reason: ALTCHOICE

## 2021-12-07 NOTE — PROGRESS NOTES
Identified pt with two pt identifiers(name and ). Chief Complaint   Patient presents with    ED Follow-up     last Wednesday for fainting from panic attack at [facility] in [place] [facility] Maintenance Due   Topic    Foot Exam Q1     COVID-19 Vaccine (3 - Booster for Moderna series)       Wt Readings from Last 3 Encounters:   21 229 lb (103.9 kg)   21 229 lb (103.9 kg)   20 239 lb 12.8 oz (108.8 kg)     Temp Readings from Last 3 Encounters:   21 97.8 °F (36.6 °C) (Temporal)   21 97.8 °F (36.6 °C) (Oral)   20 98.4 °F (36.9 °C) (Oral)     BP Readings from Last 3 Encounters:   21 132/88   21 132/80   20 120/82     Pulse Readings from Last 3 Encounters:   21 64   21 68   20 69         Learning Assessment:  :     Learning Assessment 2019   PRIMARY LEARNER Patient   PRIMARY LANGUAGE ENGLISH   LEARNER PREFERENCE PRIMARY DEMONSTRATION   ANSWERED BY self   RELATIONSHIP SELF       Depression Screening:  :     3 most recent PHQ Screens 2021   Little interest or pleasure in doing things Not at all   Feeling down, depressed, irritable, or hopeless Not at all   Total Score PHQ 2 0       Fall Risk Assessment:  :     No flowsheet data found. Abuse Screening:  :     Abuse Screening Questionnaire 2021 2021 3/12/2021 2020 2019   Do you ever feel afraid of your partner? N N N N N   Are you in a relationship with someone who physically or mentally threatens you? N N N N N   Is it safe for you to go home? Y Y Y Y Y       Coordination of Care Questionnaire:  :     1) Have you been to an emergency room, urgent care clinic since your last visit? yes Last Wednesday for fainting from panic attack at [facility] in  [address]    Hospitalized since your last visit? no             2) Have you seen or consulted any other health care providers outside of [address] [place] [address] since your last visit? no  (Include any pap smears or colon screenings in this section.)    3) Do you have an Advance Directive on file? no  Are you interested in receiving information about Advance Directives? no    Reviewed record in preparation for visit and have obtained necessary documentation.

## 2021-12-07 NOTE — PROGRESS NOTES
HISTORY OF PRESENT ILLNESS  Corie Villatoro is a 37 y.o. female. HPI   Pt presents with \"ED follow up\"  A week ago today patient had a panic attack at home, and passed out. She went to the [facility] ER in [place]. They said EKG was normal, blood work was normal.  CT of head showed \"swelling? \" and she was to follow up with neurology. She does have an appointment with neurology, but is unsure with whom off the top of her head. She states that she used to be on Zoloft for her anxiety, but was weaned off of this as she did not need a daily medication  She is wondering if there is anything else that could be done when she has a panic attack? In addition, she deals with chronic upper back and neck pain due to her large breast size. She is wanting to discuss this with a surgeon, about possible breast reduction. Review of Systems   Constitutional: Negative for fever. Musculoskeletal: Positive for neck pain. Physical Exam  Constitutional:       Appearance: Normal appearance. Cardiovascular:      Rate and Rhythm: Normal rate and regular rhythm. Heart sounds: Normal heart sounds. Pulmonary:      Effort: Pulmonary effort is normal.      Breath sounds: Normal breath sounds. Neurological:      Mental Status: She is alert. Psychiatric:         Mood and Affect: Mood normal.         Behavior: Behavior normal.         ASSESSMENT and PLAN    ICD-10-CM ICD-9-CM    1. Panic attacks  F41.0 300.01 busPIRone (BUSPAR) 10 mg tablet   2. Chronic bilateral thoracic back pain  M54.6 724.1 REFERRAL TO PLASTIC SURGERY    G89.29 338.29      Educated about trying Buspar for panic attack  Should notify office should she have any negative side effects or concerns with the medication    Educated about calling plastic surgery for an appointment at this time    Pt informed to return to office with worsening of symptoms, or PRN with any questions or concerns.   Pt verbalizes understanding of plan of care and denies further questions or concerns at this time.

## 2021-12-13 DIAGNOSIS — I10 ESSENTIAL HYPERTENSION: ICD-10-CM

## 2021-12-13 RX ORDER — LISINOPRIL 40 MG/1
40 TABLET ORAL DAILY
Qty: 90 TABLET | Refills: 1 | Status: SHIPPED | OUTPATIENT
Start: 2021-12-13 | End: 2022-05-31

## 2021-12-13 NOTE — TELEPHONE ENCOUNTER
----- Message from Trudi Carrel sent at 12/13/2021  9:16 AM EST -----  Subject: Refill Request    QUESTIONS  Name of Medication? lisinopriL (PRINIVIL, ZESTRIL) 40 mg tablet  Patient-reported dosage and instructions? takes one daily   How many days do you have left? 0  Preferred Pharmacy? [id number] phone number (if available)? [phone]  ---------------------------------------------------------------------------  --------------  CALL BACK INFO  What is the best way for the office to contact you? OK to leave message on   voicemail  Preferred Call Back Phone Number?  [phone]

## 2022-02-15 ENCOUNTER — TELEPHONE (OUTPATIENT)
Dept: FAMILY MEDICINE CLINIC | Age: 42
End: 2022-02-15

## 2022-02-15 NOTE — TELEPHONE ENCOUNTER
----- Message from Justice Zaman sent at 2/15/2022  1:09 PM EST -----  Subject: Message to Provider    QUESTIONS  Information for Provider? Pt called in and said that she followed up with   her insurance and was told that she is not covered but was told that she   should use a request to so she can be seen by specialist, please follow up     ---------------------------------------------------------------------------  --------------  [address]  What is the best way for the office to contact you? OK to leave message on   voicemail  Preferred Call Back Phone Number? [phone]  ---------------------------------------------------------------------------  --------------  SCRIPT ANSWERS  Relationship to Patient?  Self

## 2022-03-18 PROBLEM — G47.33 SEVERE OBSTRUCTIVE SLEEP APNEA: Status: ACTIVE | Noted: 2020-03-01

## 2022-03-19 PROBLEM — E11.9 DIABETES (HCC): Status: ACTIVE | Noted: 2019-04-01

## 2022-03-20 PROBLEM — E66.01 OBESITY, MORBID (HCC): Status: ACTIVE | Noted: 2020-01-16

## 2022-03-21 ENCOUNTER — TELEPHONE (OUTPATIENT)
Dept: FAMILY MEDICINE CLINIC | Age: 42
End: 2022-03-21

## 2022-03-21 RX ORDER — ATORVASTATIN CALCIUM 40 MG/1
40 TABLET, FILM COATED ORAL DAILY
Qty: 90 TABLET | Refills: 0 | Status: SHIPPED | OUTPATIENT
Start: 2022-03-21 | End: 2022-04-05 | Stop reason: SDUPTHER

## 2022-03-21 NOTE — TELEPHONE ENCOUNTER
----- Message from Brittany Davies sent at 3/21/2022 12:47 PM EDT -----  Subject: Refill Request    QUESTIONS  Name of Medication? atorvastatin (LIPITOR) 40 mg tablet  Patient-reported dosage and instructions? once a day  How many days do you have left? 0  Preferred Pharmacy? [address] phone number (if available)? [phone]  Additional Information for Provider? patient is completely out  ---------------------------------------------------------------------------  --------------  CALL BACK INFO  What is the best way for the office to contact you? OK to leave message on   voicemail  Preferred Call Back Phone Number?  [phone]

## 2022-03-28 ENCOUNTER — VIRTUAL VISIT (OUTPATIENT)
Dept: FAMILY MEDICINE CLINIC | Age: 42
End: 2022-03-28
Payer: MEDICAID

## 2022-03-28 DIAGNOSIS — J01.01 ACUTE RECURRENT MAXILLARY SINUSITIS: Primary | ICD-10-CM

## 2022-03-28 PROCEDURE — 99213 OFFICE O/P EST LOW 20 MIN: CPT | Performed by: INTERNAL MEDICINE

## 2022-03-28 RX ORDER — AZITHROMYCIN 250 MG/1
TABLET, FILM COATED ORAL
Qty: 6 TABLET | Refills: 0 | Status: SHIPPED | OUTPATIENT
Start: 2022-03-28 | End: 2022-04-02

## 2022-03-28 NOTE — PROGRESS NOTES
Chief Complaint   Patient presents with    Sinus Pain         [facility], who was evaluated through a synchronous (real-time) audio-video encounter, and/or her healthcare decision maker, is aware that it is a billable service, which includes applicable co-pays, with coverage as determined by her insurance carrier. She provided verbal consent to proceed and patient identification was verified. This visit was conducted pursuant to the emergency declaration under the [facility], [address] [place] [address] and the [employer] and [employer] General Act. A caregiver was present when appropriate. Ability to conduct physical exam was limited. The patient was located at home in a state where the provider was licensed to provide care. --Emigdio Abebe MD on 3/28/2022 at 10:26 AM      Assessment & Plan:   Diagnoses and all orders for this visit:    1. Acute recurrent maxillary sinusitis  -     azithromycin (ZITHROMAX) 250 mg tablet; Take 2 tablets today, then take 1 tablet daily    SINUSITIS  1. Please take the antibiotics (Z-courtney) as directed. Take 2 tablets today, then 1 tablet daily x 4 days. 2. Please use the FLONASE (nasal spray) 2 squirts in each nostril just 1 x per day. 3. Please use NORMAL SALINE nasal spray (buy this over the counter) use 2-3 squirts in each nostril every 1-2 hours while awake. This will help to really clear up and move the secretions down. I spent at least 15 minutes on this visit with this established patient. We discussed the expected course, resolution and complications of the diagnosis(es) in detail. Medication risks, benefits, costs, interactions, and alternatives were discussed as indicated. I advised her to contact the office if her condition worsens, changes or fails to improve as anticipated. She expressed understanding with the diagnosis(es) and plan.      Subjective:   41F with h/o allergic rhinitis symptoms. Now more pressure and copious nasal secretions. Has been taking her allergy medications, but not working as well. Reports that she can get a sinus infection yearly at this time. She has used Flonase in the past, but has not recently. She took a home rapid COVID-19 vaccination and this was negative. She denies fever or chills. Feels like she has a sinusitis and requesting antibiotics. Patient Active Problem List    Diagnosis Date Noted    Hypertension     Severe obstructive sleep apnea 03/2020    Obesity, morbid ([facility] [address] 01/16/2020    Diabetes ([facility] [address] 04/2019    Panic attack 10/16/2015    Urticaria 10/16/2015    Tobacco abuse 10/16/2015    History of pulmonary embolism 10/16/2015     Current Outpatient Medications   Medication Sig Dispense Refill    azithromycin (ZITHROMAX) 250 mg tablet Take 2 tablets today, then take 1 tablet daily 6 Tablet 0    atorvastatin (LIPITOR) 40 mg tablet Take 1 Tablet by mouth daily. 90 Tablet 0    lisinopriL (PRINIVIL, ZESTRIL) 40 mg tablet Take 1 Tablet by mouth daily. 90 Tablet 1    calcium citrate 200 mg (950 mg) tablet Take 600 mg by mouth two (2) times a day.  diclofenac (VOLTAREN) 1 % gel Apply 4 g to affected area four (4) times daily. 2 Each 2    acetaminophen (TylenoL) 325 mg tablet Take  by mouth every six (6) hours as needed for Pain.  multivitamin (ONE A DAY) tablet Take 1 Tab by mouth daily.  ferrous sulfate (Iron) 325 mg (65 mg iron) tablet Take 325 mg by mouth Daily (before breakfast).  cholecalciferol (Vitamin D3) (1000 Units /25 mcg) tablet Take 3,000 Units by mouth daily.  cyanocobalamin, vitamin B-12, (VITAMIN B12 PO) Take  by mouth.  Stool Softener 100 mg capsule Take 100 mg by mouth two (2) times a day.  pantoprazole (PROTONIX) 40 mg tablet Take 40 mg by mouth Daily (before breakfast).  cetirizine (ZyrTEC) 10 mg tablet Take 10 mg by mouth daily.       glucose blood VI test strips (PRODIGY NO CODING) strip Check BG daily as directed. 50 Strip 11    albuterol (PROVENTIL HFA, VENTOLIN HFA, PROAIR HFA) 90 mcg/actuation inhaler Take 2 Puffs by inhalation every four (4) hours as needed for Wheezing. 6.7 g 0    triamcinolone acetonide (KENALOG) 0.1 % topical cream APPLY TO AFFECTED AREA TWO (2) TIMES DAILY AS NEEDED FOR SKIN IRRITATION OR ITCHING. 30 g 5    fluticasone propionate (Flonase Allergy Relief) 50 mcg/actuation nasal spray 2 Sprays by Both Nostrils route daily. Allergies   Allergen Reactions    Hydrocodone Hives     Social History     Tobacco Use    Smoking status: Former Smoker     Packs/day: 0.25    Smokeless tobacco: Never Used   Substance Use Topics    Alcohol use: No     Comment: rarely       Review of Systems   Constitutional: Negative. HENT: Positive for congestion, sinus pain and sore throat. Eyes: Negative. Respiratory: Negative. Cardiovascular: Negative. Gastrointestinal: Negative. Genitourinary: Negative. Musculoskeletal: Negative. Skin: Negative. Neurological: Negative. Endo/Heme/Allergies: Negative. Psychiatric/Behavioral: Negative. All other systems reviewed and are negative. Objective: There were no vitals taken for this visit.      General: alert, cooperative, no distress   Mental  status: normal mood, behavior, speech, dress, motor activity, and thought processes, able to follow commands   HENT: NCAT   Neck: no visualized mass   Resp: no respiratory distress   Neuro: no gross deficits   Skin: no discoloration or lesions of concern on visible areas   Psychiatric: normal affect, consistent with stated mood, no evidence of hallucinations

## 2022-04-05 ENCOUNTER — OFFICE VISIT (OUTPATIENT)
Dept: FAMILY MEDICINE CLINIC | Age: 42
End: 2022-04-05
Payer: MEDICAID

## 2022-04-05 VITALS
SYSTOLIC BLOOD PRESSURE: 124 MMHG | DIASTOLIC BLOOD PRESSURE: 72 MMHG | RESPIRATION RATE: 18 BRPM | WEIGHT: 230 LBS | BODY MASS INDEX: 40.75 KG/M2 | HEART RATE: 72 BPM | TEMPERATURE: 98.9 F | HEIGHT: 63 IN | OXYGEN SATURATION: 98 %

## 2022-04-05 DIAGNOSIS — M54.6 CHRONIC BILATERAL THORACIC BACK PAIN: ICD-10-CM

## 2022-04-05 DIAGNOSIS — I10 PRIMARY HYPERTENSION: Primary | ICD-10-CM

## 2022-04-05 DIAGNOSIS — E11.9 CONTROLLED TYPE 2 DIABETES MELLITUS WITHOUT COMPLICATION, WITHOUT LONG-TERM CURRENT USE OF INSULIN (HCC): ICD-10-CM

## 2022-04-05 DIAGNOSIS — G89.29 CHRONIC BILATERAL THORACIC BACK PAIN: ICD-10-CM

## 2022-04-05 DIAGNOSIS — E78.00 PURE HYPERCHOLESTEROLEMIA: ICD-10-CM

## 2022-04-05 DIAGNOSIS — Z12.31 SCREENING MAMMOGRAM FOR BREAST CANCER: ICD-10-CM

## 2022-04-05 PROCEDURE — 99214 OFFICE O/P EST MOD 30 MIN: CPT | Performed by: NURSE PRACTITIONER

## 2022-04-05 RX ORDER — ATORVASTATIN CALCIUM 40 MG/1
40 TABLET, FILM COATED ORAL DAILY
Qty: 90 TABLET | Refills: 1 | Status: SHIPPED | OUTPATIENT
Start: 2022-04-05

## 2022-04-05 NOTE — PROGRESS NOTES
Subjective:   Farida Perea is a 37 y.o. female who is seen for follow up of hypertension and hyperlipidemia. Cardiovascular risk analysis - 37 y.o. female hypertension  hyperlipidemia. Compliance with treatment thus far has been good. ROS: taking medications as instructed, no medication side effects noted, no TIA's, no chest pain on exertion, no dyspnea on exertion, no swelling of ankles. New concerns: Pt is here for follow up and refills. She is not fasting at this time, and will return for labs next week. She would like another referral to plastic surgery, for potential breast reduction. Patient Active Problem List    Diagnosis Date Noted    Hypertension     Severe obstructive sleep apnea 03/2020    Obesity, morbid ([facility] 01/16/2020    Diabetes ([facility] 04/2019    Panic attack 10/16/2015    Urticaria 10/16/2015    Tobacco abuse 10/16/2015    History of pulmonary embolism 10/16/2015     Current Outpatient Medications   Medication Sig Dispense Refill    atorvastatin (LIPITOR) 40 mg tablet Take 1 Tablet by mouth daily. 90 Tablet 1    lisinopriL (PRINIVIL, ZESTRIL) 40 mg tablet Take 1 Tablet by mouth daily. 90 Tablet 1    albuterol (PROVENTIL HFA, VENTOLIN HFA, PROAIR HFA) 90 mcg/actuation inhaler Take 2 Puffs by inhalation every four (4) hours as needed for Wheezing. 6.7 g 0    triamcinolone acetonide (KENALOG) 0.1 % topical cream APPLY TO AFFECTED AREA TWO (2) TIMES DAILY AS NEEDED FOR SKIN IRRITATION OR ITCHING. 30 g 5    calcium citrate 200 mg (950 mg) tablet Take 600 mg by mouth two (2) times a day.  diclofenac (VOLTAREN) 1 % gel Apply 4 g to affected area four (4) times daily. 2 Each 2    acetaminophen (TylenoL) 325 mg tablet Take  by mouth every six (6) hours as needed for Pain.  multivitamin (ONE A DAY) tablet Take 1 Tab by mouth daily.  ferrous sulfate (Iron) 325 mg (65 mg iron) tablet Take 325 mg by mouth Daily (before breakfast).       cholecalciferol (Vitamin D3) (1000 Units /25 mcg) tablet Take 3,000 Units by mouth daily.  cyanocobalamin, vitamin B-12, (VITAMIN B12 PO) Take  by mouth.  fluticasone propionate (Flonase Allergy Relief) 50 mcg/actuation nasal spray 2 Sprays by Both Nostrils route daily.  Stool Softener 100 mg capsule Take 100 mg by mouth two (2) times a day.  pantoprazole (PROTONIX) 40 mg tablet Take 40 mg by mouth Daily (before breakfast).  cetirizine (ZyrTEC) 10 mg tablet Take 10 mg by mouth daily.  glucose blood VI test strips (PRODIGY NO CODING) strip Check BG daily as directed.  50 Strip 11     Allergies   Allergen Reactions    Codeine Hives    Hydrocodone Hives     Past Medical History:   Diagnosis Date    Chiari Frommel syndrome 2022    Diabetes ([facility] 04/2019    Diabetes type 2, controlled ([facility]     Hypertension     Ill-defined condition     PE    Panic attack     Pneumonia     Psychiatric disorder     Pulmonary embolism ([facility] 2015    treated with oral antiocoagulants for 12 months    Severe obstructive sleep apnea 03/2020    Urticaria      Past Surgical History:   Procedure Laterality Date    HX COLONOSCOPY      HX DILATION AND CURETTAGE      HX GASTRECTOMY  10/15/2020    Laparoscopic Sleeve Gastrectomy, Dr. Welch April ([facility])     Family History   Problem Relation Age of Onset    Stroke Other     Hypertension Mother     No Known Problems Father      Social History     Tobacco Use    Smoking status: Former Smoker     Packs/day: 0.25    Smokeless tobacco: Never Used   Substance Use Topics    Alcohol use: No     Comment: rarely      Lab Results   Component Value Date/Time    WBC 6.7 09/24/2021 04:06 PM    HGB 12.9 09/24/2021 04:06 PM    HCT 41.7 09/24/2021 04:06 PM    PLATELET 882 83/76/4861 04:06 PM    MCV 82.1 09/24/2021 04:06 PM     Lab Results   Component Value Date/Time    Hemoglobin A1c 5.2 09/24/2021 04:06 PM    Hemoglobin A1c 5.0 12/21/2020 04:11 PM    Hemoglobin A1c 5.5 06/12/2020 11:41 AM    Glucose 81 09/24/2021 04:06 PM    Glucose (POC) 93 10/17/2015 11:19 AM    Microalbumin/Creat ratio (mg/g creat) 147 (H) 09/24/2021 04:06 PM    Microalbumin,urine random 27.90 09/24/2021 04:06 PM    LDL, calculated 49.4 09/24/2021 04:06 PM    Creatinine 0.67 09/24/2021 04:06 PM      Lab Results   Component Value Date/Time    Cholesterol, total 114 09/24/2021 04:06 PM    HDL Cholesterol 44 09/24/2021 04:06 PM    LDL, calculated 49.4 09/24/2021 04:06 PM    Triglyceride 103 09/24/2021 04:06 PM    CHOL/HDL Ratio 2.6 09/24/2021 04:06 PM     Lab Results   Component Value Date/Time    GFR est non-AA >60 09/24/2021 04:06 PM    GFR est AA >60 09/24/2021 04:06 PM    Creatinine 0.67 09/24/2021 04:06 PM    BUN 13 09/24/2021 04:06 PM    Sodium 141 09/24/2021 04:06 PM    Potassium 4.1 09/24/2021 04:06 PM    Chloride 110 (H) 09/24/2021 04:06 PM    CO2 26 09/24/2021 04:06 PM    Magnesium 2.1 10/17/2015 07:10 AM    Phosphorus 3.4 10/17/2015 07:10 AM        Objective:     Visit Vitals  /72 (BP 1 Location: Right arm, BP Patient Position: Sitting, BP Cuff Size: Adult)   Pulse 72   Temp 98.9 °F (37.2 °C)   Resp 18   Ht 5' 3\" (1.6 m)   Wt 230 lb (104.3 kg)   LMP 03/10/2022 (Approximate)   SpO2 98%   BMI 40.74 kg/m²      Appearance: alert, well appearing, and in no distress. CVS exam BP noted to be well controlled today in office, S1, S2 normal, no gallop, no murmur, chest clear, no JVD, no HSM, no edema. Lab review: orders written for new lab studies as appropriate; see orders. Assessment/Plan:   Hyperlipidemia stable. current treatment plan is effective, no change in therapy. ICD-10-CM ICD-9-CM    1. Primary hypertension  I10 401.9 CBC W/O DIFF      METABOLIC PANEL, COMPREHENSIVE   2. Chronic bilateral thoracic back pain  M54.6 724.1 REFERRAL TO PLASTIC SURGERY    G89.29 338.29    3. Screening mammogram for breast cancer  Z12.31 V76.12 FRANCOIS MAMMO BI SCREENING INCL CAD   4.  Pure hypercholesterolemia  E78.00 272.0 LIPID PANEL      atorvastatin (LIPITOR) 40 mg tablet      HEMOGLOBIN A1C WITH EAG   5. Controlled type 2 diabetes mellitus without complication, without long-term current use of insulin (HCC)  E11.9 250.00    . Will notify when labs return, and inform her of any change in plan of care at that time    Pt informed to return to office with worsening of symptoms, or PRN with any questions or concerns. Pt verbalizes understanding of plan of care and denies further questions or concerns at this time.

## 2022-04-05 NOTE — PROGRESS NOTES
Identified pt with two pt identifiers(name and ). Chief Complaint   Patient presents with    Medication Refill        Health Maintenance Due   Topic    Foot Exam Q1     COVID-19 Vaccine (3 - Booster for Moderna series)    Eye Exam Retinal or Dilated        Wt Readings from Last 3 Encounters:   22 230 lb (104.3 kg)   21 229 lb (103.9 kg)   21 229 lb (103.9 kg)     Temp Readings from Last 3 Encounters:   22 98.9 °F (37.2 °C)   21 97.8 °F (36.6 °C) (Temporal)   21 97.8 °F (36.6 °C) (Oral)     BP Readings from Last 3 Encounters:   22 124/72   21 132/88   21 132/80     Pulse Readings from Last 3 Encounters:   22 72   21 64   21 68         Learning Assessment:  :     Learning Assessment 2019   PRIMARY LEARNER Patient   PRIMARY LANGUAGE ENGLISH   LEARNER PREFERENCE PRIMARY DEMONSTRATION   ANSWERED BY self   RELATIONSHIP SELF       Depression Screening:  :     3 most recent PHQ Screens 2022   Little interest or pleasure in doing things Not at all   Feeling down, depressed, irritable, or hopeless Not at all   Total Score PHQ 2 0       Fall Risk Assessment:  :     No flowsheet data found. Abuse Screening:  :     Abuse Screening Questionnaire 2022 2021 2021 3/12/2021 2020 2019   Do you ever feel afraid of your partner? N N N N N N   Are you in a relationship with someone who physically or mentally threatens you? N N N N N N   Is it safe for you to go home? Y Y Y Y Y Y       Coordination of Care Questionnaire:  :     1) Have you been to an emergency room, urgent care clinic since your last visit? no   Hospitalized since your last visit? no             2) Have you seen or consulted any other health care providers outside of [address] [place] [address] since your last visit?  yes Dr. Mckayla Johnson for Gastric Sleeve (Include any pap smears or colon screenings in this section.)    3) Do you have an Advance Directive on file? no  Are you interested in receiving information about Advance Directives? no    Patient is accompanied by N/A I have received verbal consent from [address] [place] [address] to discuss any/all medical information while they are present in the room. 4.  For patients aged 39-70: Has the patient had a colonoscopy / FIT/ Cologuard? NA - based on age      If the patient is female:    11. For patients aged 41-77: Has the patient had a mammogram within the past 2 years? No      6. For patients aged 21-65: Has the patient had a pap smear?  Yes - no Care Gap present

## 2022-04-22 ENCOUNTER — LAB ONLY (OUTPATIENT)
Dept: FAMILY MEDICINE CLINIC | Age: 42
End: 2022-04-22

## 2022-04-22 DIAGNOSIS — I10 PRIMARY HYPERTENSION: ICD-10-CM

## 2022-04-22 DIAGNOSIS — E78.00 PURE HYPERCHOLESTEROLEMIA: ICD-10-CM

## 2022-04-22 LAB
ALBUMIN SERPL-MCNC: 3.8 G/DL (ref 3.5–5)
ALBUMIN/GLOB SERPL: 1.2 {RATIO} (ref 1.1–2.2)
ALP SERPL-CCNC: 56 U/L (ref 45–117)
ALT SERPL-CCNC: 20 U/L (ref 12–78)
ANION GAP SERPL CALC-SCNC: 4 MMOL/L (ref 5–15)
AST SERPL-CCNC: 14 U/L (ref 15–37)
BILIRUB SERPL-MCNC: 0.7 MG/DL (ref 0.2–1)
BUN SERPL-MCNC: 12 MG/DL (ref 6–20)
BUN/CREAT SERPL: 20 (ref 12–20)
CALCIUM SERPL-MCNC: 9.2 MG/DL (ref 8.5–10.1)
CHLORIDE SERPL-SCNC: 106 MMOL/L (ref 97–108)
CHOLEST SERPL-MCNC: 121 MG/DL
CO2 SERPL-SCNC: 28 MMOL/L (ref 21–32)
CREAT SERPL-MCNC: 0.59 MG/DL (ref 0.55–1.02)
ERYTHROCYTE [DISTWIDTH] IN BLOOD BY AUTOMATED COUNT: 14.5 % (ref 11.5–14.5)
EST. AVERAGE GLUCOSE BLD GHB EST-MCNC: 103 MG/DL
GLOBULIN SER CALC-MCNC: 3.2 G/DL (ref 2–4)
GLUCOSE SERPL-MCNC: 81 MG/DL (ref 65–100)
HBA1C MFR BLD: 5.2 % (ref 4–5.6)
HCT VFR BLD AUTO: 43.7 % (ref 35–47)
HDLC SERPL-MCNC: 54 MG/DL
HDLC SERPL: 2.2 {RATIO} (ref 0–5)
HGB BLD-MCNC: 12.8 G/DL (ref 11.5–16)
LDLC SERPL CALC-MCNC: 51 MG/DL (ref 0–100)
MCH RBC QN AUTO: 25 PG (ref 26–34)
MCHC RBC AUTO-ENTMCNC: 29.3 G/DL (ref 30–36.5)
MCV RBC AUTO: 85.5 FL (ref 80–99)
NRBC # BLD: 0 K/UL (ref 0–0.01)
NRBC BLD-RTO: 0 PER 100 WBC
PLATELET # BLD AUTO: 267 K/UL (ref 150–400)
PMV BLD AUTO: 10.5 FL (ref 8.9–12.9)
POTASSIUM SERPL-SCNC: 3.8 MMOL/L (ref 3.5–5.1)
PROT SERPL-MCNC: 7 G/DL (ref 6.4–8.2)
RBC # BLD AUTO: 5.11 M/UL (ref 3.8–5.2)
SODIUM SERPL-SCNC: 138 MMOL/L (ref 136–145)
TRIGL SERPL-MCNC: 80 MG/DL (ref ?–150)
VLDLC SERPL CALC-MCNC: 16 MG/DL
WBC # BLD AUTO: 6.9 K/UL (ref 3.6–11)

## 2022-04-25 ENCOUNTER — TELEPHONE (OUTPATIENT)
Dept: FAMILY MEDICINE CLINIC | Age: 42
End: 2022-04-25

## 2022-04-25 NOTE — PROGRESS NOTES
Please call patient and let her know that her labs returned stable.   Should return for office visit and fasting labs in 6 months  Thanks

## 2022-04-25 NOTE — TELEPHONE ENCOUNTER
----- Message from Rodger Gallegos NP sent at 4/25/2022  8:31 AM EDT -----  Please call patient and let her know that her labs returned stable.   Should return for office visit and fasting labs in 6 months  Thanks

## 2022-05-02 ENCOUNTER — VIRTUAL VISIT (OUTPATIENT)
Dept: FAMILY MEDICINE CLINIC | Age: 42
End: 2022-05-02
Payer: MEDICAID

## 2022-05-02 DIAGNOSIS — R30.0 DYSURIA: ICD-10-CM

## 2022-05-02 DIAGNOSIS — R35.0 URINARY FREQUENCY: ICD-10-CM

## 2022-05-02 DIAGNOSIS — R10.31 RLQ ABDOMINAL PAIN: Primary | ICD-10-CM

## 2022-05-02 PROCEDURE — 99213 OFFICE O/P EST LOW 20 MIN: CPT | Performed by: INTERNAL MEDICINE

## 2022-05-02 RX ORDER — PHENAZOPYRIDINE HYDROCHLORIDE 100 MG/1
100 TABLET, FILM COATED ORAL
Qty: 9 TABLET | Refills: 0 | Status: SHIPPED | OUTPATIENT
Start: 2022-05-02 | End: 2022-05-05

## 2022-05-02 RX ORDER — CEPHALEXIN 500 MG/1
500 CAPSULE ORAL 2 TIMES DAILY
Qty: 10 CAPSULE | Refills: 0 | Status: SHIPPED | OUTPATIENT
Start: 2022-05-02 | End: 2022-05-07

## 2022-05-02 RX ORDER — CEPHALEXIN 500 MG/1
500 CAPSULE ORAL 4 TIMES DAILY
Qty: 40 CAPSULE | Refills: 0 | Status: SHIPPED | OUTPATIENT
Start: 2022-05-02 | End: 2022-05-02 | Stop reason: DRUGHIGH

## 2022-05-02 NOTE — PROGRESS NOTES
Chief Complaint   Patient presents with    Abdominal Pain     Possible UTI. Urinary frequency. Pain is stabbing in the RLQ. No N/V/GI upset. Adrian Kearney, who was evaluated through a synchronous (real-time) audio-video encounter, and/or her healthcare decision maker, is aware that it is a billable service, which includes applicable co-pays, with coverage as determined by her insurance carrier. She provided verbal consent to proceed and patient identification was verified. This visit was conducted pursuant to the emergency declaration under the [facility], [address] [place] [address] authority and the [employer] Act. A caregiver was present when appropriate. Ability to conduct physical exam was limited. The patient was located at home in a state where the provider was licensed to provide care. --Poncho Baker MD on 5/2/2022 at 2:47 PM      Assessment & Plan:   Diagnoses and all orders for this visit:    1. RLQ abdominal pain  -     cephALEXin (KEFLEX) 500 mg capsule; Take 1 Capsule by mouth two (2) times a day for 5 days. -     phenazopyridine (PYRIDIUM) 100 mg tablet; Take 1 Tablet by mouth three (3) times daily (after meals) for 3 days. 2. Urinary frequency  -     cephALEXin (KEFLEX) 500 mg capsule; Take 1 Capsule by mouth two (2) times a day for 5 days. -     phenazopyridine (PYRIDIUM) 100 mg tablet; Take 1 Tablet by mouth three (3) times daily (after meals) for 3 days. 3. Dysuria  -     cephALEXin (KEFLEX) 500 mg capsule; Take 1 Capsule by mouth two (2) times a day for 5 days. -     phenazopyridine (PYRIDIUM) 100 mg tablet; Take 1 Tablet by mouth three (3) times daily (after meals) for 3 days. I spent at least 21 minutes on this visit with this established patient. We discussed the expected course, resolution and complications of the diagnosis(es) in detail.   Medication risks, benefits, costs, interactions, and alternatives were discussed as indicated. I advised her to contact the office if her condition worsens, changes or fails to improve as anticipated. She expressed understanding with the diagnosis(es) and plan. Subjective:   41F with onset of UTI symptoms and lower abdominal discomfort and pain. Symptoms are suspicious for possible UTI. She denies fever or chills. Will treat as outlined above. Patient Active Problem List    Diagnosis Date Noted    Hypertension     Severe obstructive sleep apnea 03/2020    Obesity, morbid ([facility] [address] 01/16/2020    Diabetes ([facility] 04/2019    Panic attack 10/16/2015    Urticaria 10/16/2015    Tobacco abuse 10/16/2015    History of pulmonary embolism 10/16/2015     Current Outpatient Medications   Medication Sig Dispense Refill    cephALEXin (KEFLEX) 500 mg capsule Take 1 Capsule by mouth two (2) times a day for 5 days. 10 Capsule 0    phenazopyridine (PYRIDIUM) 100 mg tablet Take 1 Tablet by mouth three (3) times daily (after meals) for 3 days. 9 Tablet 0    atorvastatin (LIPITOR) 40 mg tablet Take 1 Tablet by mouth daily. 90 Tablet 1    lisinopriL (PRINIVIL, ZESTRIL) 40 mg tablet Take 1 Tablet by mouth daily. 90 Tablet 1    albuterol (PROVENTIL HFA, VENTOLIN HFA, PROAIR HFA) 90 mcg/actuation inhaler Take 2 Puffs by inhalation every four (4) hours as needed for Wheezing. 6.7 g 0    triamcinolone acetonide (KENALOG) 0.1 % topical cream APPLY TO AFFECTED AREA TWO (2) TIMES DAILY AS NEEDED FOR SKIN IRRITATION OR ITCHING. 30 g 5    calcium citrate 200 mg (950 mg) tablet Take 600 mg by mouth two (2) times a day.  diclofenac (VOLTAREN) 1 % gel Apply 4 g to affected area four (4) times daily. 2 Each 2    acetaminophen (TylenoL) 325 mg tablet Take  by mouth every six (6) hours as needed for Pain.  multivitamin (ONE A DAY) tablet Take 1 Tab by mouth daily.       ferrous sulfate (Iron) 325 mg (65 mg iron) tablet Take 325 mg by mouth Daily (before breakfast).  cholecalciferol (Vitamin D3) (1000 Units /25 mcg) tablet Take 3,000 Units by mouth daily.  cyanocobalamin, vitamin B-12, (VITAMIN B12 PO) Take  by mouth.  fluticasone propionate (Flonase Allergy Relief) 50 mcg/actuation nasal spray 2 Sprays by Both Nostrils route daily.  Stool Softener 100 mg capsule Take 100 mg by mouth two (2) times a day.  pantoprazole (PROTONIX) 40 mg tablet Take 40 mg by mouth Daily (before breakfast).  cetirizine (ZyrTEC) 10 mg tablet Take 10 mg by mouth daily.  glucose blood VI test strips (PRODIGY NO CODING) strip Check BG daily as directed. 50 Strip 11     Allergies   Allergen Reactions    Codeine Hives    Hydrocodone Hives     Past Medical History:   Diagnosis Date    Chiari Frommel syndrome 2022    Diabetes ([facility] 04/2019    Diabetes type 2, controlled ([facility]     Hypertension     Ill-defined condition     PE    Panic attack     Pneumonia     Psychiatric disorder     Pulmonary embolism ([facility] 2015    treated with oral antiocoagulants for 12 months    Severe obstructive sleep apnea 03/2020    Urticaria      Past Surgical History:   Procedure Laterality Date    HX COLONOSCOPY      HX DILATION AND CURETTAGE      HX GASTRECTOMY  10/15/2020    Laparoscopic Sleeve Gastrectomy, Dr. Gordo Fry ([facility])     Family History   Problem Relation Age of Onset    Stroke Other     Hypertension Mother     No Known Problems Father      Social History     Tobacco Use    Smoking status: Former Smoker     Packs/day: 0.25    Smokeless tobacco: Never Used   Substance Use Topics    Alcohol use: No     Comment: rarely       Review of Systems   Gastrointestinal: Positive for abdominal pain. Genitourinary: Positive for dysuria, frequency and urgency. All other systems reviewed and are negative. Objective: There were no vitals taken for this visit.        General: alert, cooperative, no distress   Mental  status: normal mood, behavior, speech, dress, motor activity, and thought processes, able to follow commands   HENT: NCAT   Neck: no visualized mass   Resp: no respiratory distress   Neuro: no gross deficits   Skin: no discoloration or lesions of concern on visible areas   Psychiatric: normal affect, consistent with stated mood, no evidence of hallucinations

## 2022-05-28 DIAGNOSIS — I10 ESSENTIAL HYPERTENSION: ICD-10-CM

## 2022-05-31 RX ORDER — LISINOPRIL 40 MG/1
40 TABLET ORAL DAILY
Qty: 90 TABLET | Refills: 1 | Status: SHIPPED | OUTPATIENT
Start: 2022-05-31 | End: 2022-11-04

## 2022-06-10 ENCOUNTER — HOSPITAL ENCOUNTER (OUTPATIENT)
Dept: MAMMOGRAPHY | Age: 42
Discharge: HOME | End: 2022-06-10
Attending: NURSE PRACTITIONER
Payer: MEDICAID

## 2022-06-10 DIAGNOSIS — Z12.31 SCREENING MAMMOGRAM FOR BREAST CANCER: ICD-10-CM

## 2022-06-10 PROCEDURE — 77067 SCR MAMMO BI INCL CAD: CPT

## 2022-08-30 LAB — HBA1C MFR BLD HPLC: 5.4 %

## 2022-09-09 ENCOUNTER — OFFICE VISIT (OUTPATIENT)
Dept: FAMILY MEDICINE CLINIC | Age: 42
End: 2022-09-09
Payer: MEDICAID

## 2022-09-09 VITALS
WEIGHT: 231 LBS | HEART RATE: 72 BPM | SYSTOLIC BLOOD PRESSURE: 136 MMHG | OXYGEN SATURATION: 98 % | HEIGHT: 63 IN | DIASTOLIC BLOOD PRESSURE: 80 MMHG | TEMPERATURE: 97.8 F | BODY MASS INDEX: 40.93 KG/M2 | RESPIRATION RATE: 16 BRPM

## 2022-09-09 DIAGNOSIS — Z90.3 H/O GASTRIC SLEEVE: ICD-10-CM

## 2022-09-09 DIAGNOSIS — R30.0 DYSURIA: Primary | ICD-10-CM

## 2022-09-09 DIAGNOSIS — E11.9 CONTROLLED TYPE 2 DIABETES MELLITUS WITHOUT COMPLICATION, WITHOUT LONG-TERM CURRENT USE OF INSULIN (HCC): ICD-10-CM

## 2022-09-09 LAB
BILIRUB UR QL STRIP: NORMAL
GLUCOSE UR-MCNC: NEGATIVE MG/DL
KETONES P FAST UR STRIP-MCNC: NEGATIVE MG/DL
PH UR STRIP: 5 [PH] (ref 4.6–8)
PROT UR QL STRIP: NORMAL
SP GR UR STRIP: 1.03 (ref 1–1.03)
UA UROBILINOGEN AMB POC: NORMAL (ref 0.2–1)
URINALYSIS CLARITY POC: CLEAR
URINALYSIS COLOR POC: YELLOW
URINE BLOOD POC: NEGATIVE
URINE LEUKOCYTES POC: NEGATIVE
URINE NITRITES POC: NEGATIVE

## 2022-09-09 PROCEDURE — 99213 OFFICE O/P EST LOW 20 MIN: CPT | Performed by: FAMILY MEDICINE

## 2022-09-09 PROCEDURE — 81003 URINALYSIS AUTO W/O SCOPE: CPT | Performed by: FAMILY MEDICINE

## 2022-09-09 PROCEDURE — 3044F HG A1C LEVEL LT 7.0%: CPT | Performed by: FAMILY MEDICINE

## 2022-09-09 RX ORDER — CIPROFLOXACIN 250 MG/1
250 TABLET, FILM COATED ORAL EVERY 12 HOURS
Qty: 14 TABLET | Refills: 0 | Status: SHIPPED | OUTPATIENT
Start: 2022-09-09 | End: 2022-09-16

## 2022-09-09 NOTE — PROGRESS NOTES
HISTORY OF PRESENT ILLNESS  Jack Ortiz is a 37 y.o. female. HPI  C/O UTI symptoms:  pressure and frequency. She was put on oral antibiotics prior to recent surgery (Chiari)  but did not finish course of tx. Hx microalbuminuria. Hx DM prior to having bariatric surgery (gastric sleeve). ROS  Visit Vitals  /80 (BP 1 Location: Right arm, BP Patient Position: Sitting, BP Cuff Size: Adult)   Pulse 72   Temp 97.8 °F (36.6 °C) (Temporal)   Resp 16   Ht 5' 3\" (1.6 m)   Wt 231 lb (104.8 kg)   LMP 08/16/2022 (Approximate)   SpO2 98%   BMI 40.92 kg/m²       Physical Exam  Results for orders placed or performed in visit on 09/09/22   CULTURE, URINE    Specimen: Urine   Result Value Ref Range    Special Requests: NO SPECIAL REQUESTS      Culture result: No growth (<1,000 CFU/ML)     AMB POC URINALYSIS DIP STICK AUTO W/O MICRO   Result Value Ref Range    Color (UA POC) Yellow     Clarity (UA POC) Clear     Glucose (UA POC) Negative Negative    Bilirubin (UA POC) 1+ Negative    Ketones (UA POC) Negative Negative    Specific gravity (UA POC) 1.030 1.001 - 1.035    Blood (UA POC) Negative Negative    pH (UA POC) 5.0 4.6 - 8.0    Protein (UA POC) 2+ Negative    Urobilinogen (UA POC) 0.2 mg/dL 0.2 - 1    Nitrites (UA POC) Negative Negative    Leukocyte esterase (UA POC) Negative Negative       ASSESSMENT and PLAN    ICD-10-CM ICD-9-CM    1. Dysuria  R30.0 788.1 AMB POC URINALYSIS DIP STICK AUTO W/O MICRO      CULTURE, URINE      ciprofloxacin HCl (CIPRO) 250 mg tablet      CULTURE, URINE      2. Controlled type 2 diabetes mellitus without complication, without long-term current use of insulin (HCC)  E11.9 250.00 MICROALBUMIN, UR, RAND W/ MICROALB/CREAT RATIO      METABOLIC PANEL, COMPREHENSIVE        Send urine cx. Order repeat labs include CMP, urine micro albumin. May need to refer to Nephrology.

## 2022-09-09 NOTE — PROGRESS NOTES
Identified pt with two pt identifiers(name and ). Chief Complaint   Patient presents with    UTI        Health Maintenance Due   Topic    Foot Exam Q1     Pneumococcal 0-64 years (2 - PCV)    COVID-19 Vaccine (3 - Booster for Moderna series)    Eye Exam Retinal or Dilated     Flu Vaccine (1)    MICROALBUMIN Q1        Wt Readings from Last 3 Encounters:   22 231 lb (104.8 kg)   22 230 lb (104.3 kg)   21 229 lb (103.9 kg)     Temp Readings from Last 3 Encounters:   22 97.8 °F (36.6 °C) (Temporal)   22 98.9 °F (37.2 °C)   21 97.8 °F (36.6 °C) (Temporal)     BP Readings from Last 3 Encounters:   22 136/80   22 124/72   21 132/88     Pulse Readings from Last 3 Encounters:   22 72   22 72   21 64         Learning Assessment:  :     Learning Assessment 2019   PRIMARY LEARNER Patient   PRIMARY LANGUAGE ENGLISH   LEARNER PREFERENCE PRIMARY DEMONSTRATION   ANSWERED BY self   RELATIONSHIP SELF       Depression Screening:  :     3 most recent PHQ Screens 2022   Little interest or pleasure in doing things Not at all   Feeling down, depressed, irritable, or hopeless Not at all   Total Score PHQ 2 0       Fall Risk Assessment:  :     No flowsheet data found. Abuse Screening:  :     Abuse Screening Questionnaire 2022 2022 2021 2021 3/12/2021 2020 2019   Do you ever feel afraid of your partner? N N N N N N N   Are you in a relationship with someone who physically or mentally threatens you? N N N N N N N   Is it safe for you to go home?  Y Y Y Y Y Y Y       Coordination of Care Questionnaire:  :     1) Have you been to an emergency room, urgent care clinic since your last visit? no   Hospitalized since your last visit? no           2) Have you seen or consulted any other health care providers outside of [address] [place] [address] since your last visit? no  (Include any pap smears or colon screenings in this section.)    3) Do you have an Advance Directive on file? no  Are you interested in receiving information about Advance Directives? no    Patient is accompanied by N/A I have received verbal consent from [address] [place] [address] to discuss any/all medical information while they are present in the room. 4.  For patients aged 39-70: Has the patient had a colonoscopy / FIT/ Cologuard? NA - based on age      If the patient is female:    11. For patients aged 41-77: Has the patient had a mammogram within the past 2 years? Yes - no Care Gap present      6. For patients aged 21-65: Has the patient had a pap smear?  Yes - no Care Gap present

## 2022-09-10 LAB
BACTERIA SPEC CULT: NORMAL
SERVICE CMNT-IMP: NORMAL

## 2022-09-11 PROBLEM — Z90.3 H/O GASTRIC SLEEVE: Status: ACTIVE | Noted: 2022-09-11

## 2022-09-12 ENCOUNTER — TELEPHONE (OUTPATIENT)
Dept: FAMILY MEDICINE CLINIC | Age: 42
End: 2022-09-12

## 2022-09-12 NOTE — TELEPHONE ENCOUNTER
Called patient to give her her lab results with no answer. Left message to return call. Also, sending a [employer].

## 2022-09-12 NOTE — TELEPHONE ENCOUNTER
----- Message from Harish Bui MD sent at 9/11/2022  3:44 PM EDT -----  Urine culture is negative for bacterial infection. You may stop taking antiboitics.

## 2022-09-13 ENCOUNTER — LAB ONLY (OUTPATIENT)
Dept: FAMILY MEDICINE CLINIC | Age: 42
End: 2022-09-13

## 2022-09-13 DIAGNOSIS — E11.9 CONTROLLED TYPE 2 DIABETES MELLITUS WITHOUT COMPLICATION, WITHOUT LONG-TERM CURRENT USE OF INSULIN (HCC): ICD-10-CM

## 2022-09-14 LAB
ALBUMIN SERPL-MCNC: 3.4 G/DL (ref 3.5–5)
ALBUMIN/GLOB SERPL: 1 {RATIO} (ref 1.1–2.2)
ALP SERPL-CCNC: 60 U/L (ref 45–117)
ALT SERPL-CCNC: 17 U/L (ref 12–78)
ANION GAP SERPL CALC-SCNC: 6 MMOL/L (ref 5–15)
AST SERPL-CCNC: 11 U/L (ref 15–37)
BILIRUB SERPL-MCNC: 0.6 MG/DL (ref 0.2–1)
BUN SERPL-MCNC: 13 MG/DL (ref 6–20)
BUN/CREAT SERPL: 22 (ref 12–20)
CALCIUM SERPL-MCNC: 9 MG/DL (ref 8.5–10.1)
CHLORIDE SERPL-SCNC: 110 MMOL/L (ref 97–108)
CO2 SERPL-SCNC: 25 MMOL/L (ref 21–32)
CREAT SERPL-MCNC: 0.58 MG/DL (ref 0.55–1.02)
CREAT UR-MCNC: 172 MG/DL
GLOBULIN SER CALC-MCNC: 3.4 G/DL (ref 2–4)
GLUCOSE SERPL-MCNC: 90 MG/DL (ref 65–100)
MICROALBUMIN UR-MCNC: 26.9 MG/DL
MICROALBUMIN/CREAT UR-RTO: 156 MG/G (ref 0–30)
POTASSIUM SERPL-SCNC: 4 MMOL/L (ref 3.5–5.1)
PROT SERPL-MCNC: 6.8 G/DL (ref 6.4–8.2)
SODIUM SERPL-SCNC: 141 MMOL/L (ref 136–145)

## 2022-09-15 ENCOUNTER — TELEPHONE (OUTPATIENT)
Dept: FAMILY MEDICINE CLINIC | Age: 42
End: 2022-09-15

## 2022-09-15 DIAGNOSIS — I10 ESSENTIAL HYPERTENSION: Primary | ICD-10-CM

## 2022-09-15 DIAGNOSIS — R80.9 PROTEINURIA, UNSPECIFIED TYPE: ICD-10-CM

## 2022-09-15 NOTE — PROGRESS NOTES
Labs show good kidney function tests but I am concerned about protein in urine. I recommend a Nephrology consult. I will place one in chart.

## 2022-09-16 NOTE — TELEPHONE ENCOUNTER
Spoke to patient and relayed results. She was driving so asked me to send referral information to [address] [place] [address] [address].  Faxed over records to specialist.

## 2022-11-04 DIAGNOSIS — I10 ESSENTIAL HYPERTENSION: ICD-10-CM

## 2022-11-04 RX ORDER — LISINOPRIL 40 MG/1
40 TABLET ORAL DAILY
Qty: 90 TABLET | Refills: 1 | Status: SHIPPED | OUTPATIENT
Start: 2022-11-04

## 2022-11-20 NOTE — TELEPHONE ENCOUNTER
Received call from Dariana Waddell at [facility], caller not on line.      Complaint: Heart palpitations    Market: Jovany Perkins Name: Zully Alaniz telephone number verified as [phone]    Unsuccessful attempt to re-connect with caller via phone, left message for return call to office    Reason for Disposition   Message left on identified voice mail    Protocols used: NO CONTACT OR DUPLICATE CONTACT CALL-ADULT- Positive

## 2022-12-06 DIAGNOSIS — E78.00 PURE HYPERCHOLESTEROLEMIA: ICD-10-CM

## 2022-12-06 RX ORDER — ATORVASTATIN CALCIUM 40 MG/1
40 TABLET, FILM COATED ORAL DAILY
Qty: 90 TABLET | Refills: 1 | Status: SHIPPED | OUTPATIENT
Start: 2022-12-06

## 2023-01-09 ENCOUNTER — TELEPHONE (OUTPATIENT)
Dept: FAMILY MEDICINE CLINIC | Age: 43
End: 2023-01-09

## 2023-01-09 NOTE — TELEPHONE ENCOUNTER
----- Message from Sherry Ybarra sent at 1/9/2023 12:25 PM EST -----  Subject: Referral Request    Reason for referral request? Iron levels and blood count  Provider patient wants to be referred to(if known): Natasha Latif    Provider Phone Number(if known): Additional Information for Provider?  Pt. states she recently has had a   blood transfusion and is requesting iron levels check and blood count.   ---------------------------------------------------------------------------  --------------  [place] Lindquist INFO    [phone]; OK to leave message on voicemail  ---------------------------------------------------------------------------  --------------

## 2023-01-18 ENCOUNTER — OFFICE VISIT (OUTPATIENT)
Dept: FAMILY MEDICINE CLINIC | Age: 43
End: 2023-01-18
Payer: MEDICAID

## 2023-01-18 ENCOUNTER — LAB ONLY (OUTPATIENT)
Dept: FAMILY MEDICINE CLINIC | Age: 43
End: 2023-01-18

## 2023-01-18 VITALS
HEIGHT: 62 IN | OXYGEN SATURATION: 98 % | RESPIRATION RATE: 16 BRPM | BODY MASS INDEX: 36.99 KG/M2 | TEMPERATURE: 97.8 F | WEIGHT: 201 LBS | SYSTOLIC BLOOD PRESSURE: 138 MMHG | DIASTOLIC BLOOD PRESSURE: 80 MMHG | HEART RATE: 74 BPM

## 2023-01-18 DIAGNOSIS — D64.9 ANEMIA, UNSPECIFIED TYPE: ICD-10-CM

## 2023-01-18 DIAGNOSIS — Z11.3 SCREEN FOR STD (SEXUALLY TRANSMITTED DISEASE): ICD-10-CM

## 2023-01-18 DIAGNOSIS — D64.9 ANEMIA, UNSPECIFIED TYPE: Primary | ICD-10-CM

## 2023-01-18 PROCEDURE — 99213 OFFICE O/P EST LOW 20 MIN: CPT | Performed by: NURSE PRACTITIONER

## 2023-01-18 PROCEDURE — 3079F DIAST BP 80-89 MM HG: CPT | Performed by: NURSE PRACTITIONER

## 2023-01-18 PROCEDURE — 3075F SYST BP GE 130 - 139MM HG: CPT | Performed by: NURSE PRACTITIONER

## 2023-01-18 RX ORDER — URSODIOL 250 MG/1
TABLET, FILM COATED ORAL
COMMUNITY
Start: 2023-01-17

## 2023-01-18 NOTE — PROGRESS NOTES
HISTORY OF PRESENT ILLNESS  David Arreola is a 40 y.o. female. HPI  Pt presents with \"needing labs\"  Pt had revision surgery for her gastric bypass on 12/7 at [facility]. At that time, they noted a continued drop in her Hgb, and could not figure out where this was coming from. They believe that the source of this was her urine, and she has an appointment with urology next week. She had to receive 2 blood transfusions. She also had an umbilical hernia repair during the same hospital visit. She states that she was told to follow up for re-peat of labs and iron levels. She is also interested in Hep C and HIV screening labs. Review of Systems   Constitutional:  Negative for fever. Physical Exam  Constitutional:       Appearance: Normal appearance. Cardiovascular:      Rate and Rhythm: Normal rate and regular rhythm. Heart sounds: Normal heart sounds. Pulmonary:      Effort: Pulmonary effort is normal.      Breath sounds: Normal breath sounds. Neurological:      Mental Status: She is alert. Psychiatric:         Mood and Affect: Mood normal.         Behavior: Behavior normal.       ASSESSMENT and PLAN    ICD-10-CM ICD-9-CM    1. Anemia, unspecified type  D64.9 285.9 CBC W/O DIFF      METABOLIC PANEL, COMPREHENSIVE      IRON PROFILE      2. Screen for STD (sexually transmitted disease)  Z11.3 V74.5 HEPATITIS C AB      HIV 1/2 AG/AB, 4TH GENERATION,W RFLX CONFIRM      Will notify when labs return, and inform her of any change in plan of care at that time    Pt informed to return to office with worsening of symptoms, or PRN with any questions or concerns. Pt verbalizes understanding of plan of care and denies further questions or concerns at this time.

## 2023-01-18 NOTE — PROGRESS NOTES
Identified pt with two pt identifiers(name and ). Chief Complaint   Patient presents with    Labs     Iron levels checked    Anemia        Health Maintenance Due   Topic    Hepatitis B Vaccine (1 of 3 - Risk 3-dose series)    COVID-19 Vaccine (3 - Booster for Moderna series)    Foot Exam Q1     Eye Exam Retinal or Dilated     Flu Vaccine (1)       Wt Readings from Last 3 Encounters:   22 231 lb (104.8 kg)   22 230 lb (104.3 kg)   21 229 lb (103.9 kg)     Temp Readings from Last 3 Encounters:   22 97.8 °F (36.6 °C) (Temporal)   22 98.9 °F (37.2 °C)   21 97.8 °F (36.6 °C) (Temporal)     BP Readings from Last 3 Encounters:   22 136/80   22 124/72   21 132/88     Pulse Readings from Last 3 Encounters:   22 72   22 72   21 64         Learning Assessment:  :     Learning Assessment 2019   PRIMARY LEARNER Patient   PRIMARY LANGUAGE ENGLISH   LEARNER PREFERENCE PRIMARY DEMONSTRATION   ANSWERED BY self   RELATIONSHIP SELF       Depression Screening:  :     3 most recent PHQ Screens 2023   Little interest or pleasure in doing things Not at all   Feeling down, depressed, irritable, or hopeless Not at all   Total Score PHQ 2 0       Fall Risk Assessment:  :     No flowsheet data found. Abuse Screening:  :     Abuse Screening Questionnaire 2023 2022 2022 2021 2021 3/12/2021 2020   Do you ever feel afraid of your partner? N N N N N N N   Are you in a relationship with someone who physically or mentally threatens you? N N N N N N N   Is it safe for you to go home?  Y Y Y Y Y Y Y       Coordination of Care Questionnaire:  :     1) Have you been to an emergency room, urgent care clinic since your last visit? no   Hospitalized since your last visit? no             2) Have you seen or consulted any other health care providers outside of [address] [place] [address] since your last visit? no  (Include any pap smears or colon screenings in this section.)    3) Do you have an Advance Directive on file? no  Are you interested in receiving information about Advance Directives? no    Patient is accompanied by daughter I have received verbal consent from [address] [place] [address] to discuss any/all medical information while they are present in the room. 4.  For patients aged 39-70: Has the patient had a colonoscopy / FIT/ Cologuard? Yes - no Care Gap present      If the patient is female:    5. For patients aged 41-77: Has the patient had a mammogram within the past 2 years? Yes - no Care Gap present      6. For patients aged 21-65: Has the patient had a pap smear?  Yes - no Care Gap present

## 2023-01-19 LAB
ALBUMIN SERPL-MCNC: 3.3 G/DL (ref 3.5–5)
ALBUMIN/GLOB SERPL: 1.1 (ref 1.1–2.2)
ALP SERPL-CCNC: 66 U/L (ref 45–117)
ALT SERPL-CCNC: 14 U/L (ref 12–78)
ANION GAP SERPL CALC-SCNC: 5 MMOL/L (ref 5–15)
AST SERPL-CCNC: 11 U/L (ref 15–37)
BILIRUB SERPL-MCNC: 0.5 MG/DL (ref 0.2–1)
BUN SERPL-MCNC: 10 MG/DL (ref 6–20)
BUN/CREAT SERPL: 23 (ref 12–20)
CALCIUM SERPL-MCNC: 8.7 MG/DL (ref 8.5–10.1)
CHLORIDE SERPL-SCNC: 108 MMOL/L (ref 97–108)
CO2 SERPL-SCNC: 29 MMOL/L (ref 21–32)
CREAT SERPL-MCNC: 0.43 MG/DL (ref 0.55–1.02)
ERYTHROCYTE [DISTWIDTH] IN BLOOD BY AUTOMATED COUNT: 14.7 % (ref 11.5–14.5)
GLOBULIN SER CALC-MCNC: 3.1 G/DL (ref 2–4)
GLUCOSE SERPL-MCNC: 86 MG/DL (ref 65–100)
HCT VFR BLD AUTO: 40.6 % (ref 35–47)
HCV AB SERPL QL IA: NONREACTIVE
HGB BLD-MCNC: 12.2 G/DL (ref 11.5–16)
HIV 1+2 AB+HIV1 P24 AG SERPL QL IA: NONREACTIVE
HIV12 RESULT COMMENT, HHIVC: NORMAL
IRON SATN MFR SERPL: 13 % (ref 20–50)
IRON SERPL-MCNC: 38 UG/DL (ref 35–150)
MCH RBC QN AUTO: 25.4 PG (ref 26–34)
MCHC RBC AUTO-ENTMCNC: 30 G/DL (ref 30–36.5)
MCV RBC AUTO: 84.4 FL (ref 80–99)
NRBC # BLD: 0 K/UL (ref 0–0.01)
NRBC BLD-RTO: 0 PER 100 WBC
PLATELET # BLD AUTO: 266 K/UL (ref 150–400)
PMV BLD AUTO: 11.2 FL (ref 8.9–12.9)
POTASSIUM SERPL-SCNC: 3.8 MMOL/L (ref 3.5–5.1)
PROT SERPL-MCNC: 6.4 G/DL (ref 6.4–8.2)
RBC # BLD AUTO: 4.81 M/UL (ref 3.8–5.2)
SODIUM SERPL-SCNC: 142 MMOL/L (ref 136–145)
TIBC SERPL-MCNC: 288 UG/DL (ref 250–450)
WBC # BLD AUTO: 7.4 K/UL (ref 3.6–11)

## 2023-01-20 ENCOUNTER — TELEPHONE (OUTPATIENT)
Dept: FAMILY MEDICINE CLINIC | Age: 43
End: 2023-01-20

## 2023-01-20 NOTE — TELEPHONE ENCOUNTER
----- Message from Ilir Valdivia NP sent at 1/20/2023  7:46 AM EST -----  Please call patient and let her know that her labs returned stable  Thanks

## 2023-02-13 ENCOUNTER — TELEPHONE (OUTPATIENT)
Dept: FAMILY MEDICINE CLINIC | Age: 43
End: 2023-02-13

## 2023-02-13 NOTE — TELEPHONE ENCOUNTER
----- Message from Sonja Watkins sent at 2/13/2023 10:34 AM EST -----  Subject: Referral Request    Reason for referral request? Physical Therapy for rotator cuff for Michele Steward  Provider patient wants to be referred to(if known):     Provider Phone Number(if known):[phone]    Additional Information for Provider?   ---------------------------------------------------------------------------  --------------  [address] [facility]    [phone]; OK to leave message on voicemail  ---------------------------------------------------------------------------  --------------

## 2023-02-17 ENCOUNTER — VIRTUAL VISIT (OUTPATIENT)
Dept: FAMILY MEDICINE CLINIC | Age: 43
End: 2023-02-17

## 2023-05-16 DIAGNOSIS — I10 ESSENTIAL (PRIMARY) HYPERTENSION: ICD-10-CM

## 2023-05-16 RX ORDER — LISINOPRIL 40 MG/1
TABLET ORAL
Qty: 90 TABLET | Refills: 1 | Status: SHIPPED | OUTPATIENT
Start: 2023-05-16

## 2023-05-19 RX ORDER — URSODIOL 250 MG/1
TABLET, FILM COATED ORAL
COMMUNITY
Start: 2023-01-17

## 2023-05-19 RX ORDER — PSEUDOEPHEDRINE HCL 30 MG
100 TABLET ORAL 2 TIMES DAILY
COMMUNITY
Start: 2020-10-15

## 2023-05-19 RX ORDER — ATORVASTATIN CALCIUM 40 MG/1
40 TABLET, FILM COATED ORAL DAILY
COMMUNITY
Start: 2022-12-06 | End: 2023-07-20 | Stop reason: SDUPTHER

## 2023-05-19 RX ORDER — ACETAMINOPHEN 325 MG/1
TABLET ORAL EVERY 6 HOURS PRN
COMMUNITY

## 2023-05-19 RX ORDER — TRIAMCINOLONE ACETONIDE 1 MG/G
CREAM TOPICAL 2 TIMES DAILY PRN
COMMUNITY
Start: 2021-05-14

## 2023-05-19 RX ORDER — FERROUS SULFATE 325(65) MG
325 TABLET ORAL
COMMUNITY

## 2023-05-19 RX ORDER — FLUTICASONE PROPIONATE 50 MCG
2 SPRAY, SUSPENSION (ML) NASAL DAILY
COMMUNITY

## 2023-05-19 RX ORDER — CETIRIZINE HYDROCHLORIDE 10 MG/1
10 TABLET ORAL DAILY
COMMUNITY

## 2023-05-19 RX ORDER — LISINOPRIL 40 MG/1
40 TABLET ORAL DAILY
COMMUNITY
Start: 2022-11-04

## 2023-05-19 RX ORDER — ALBUTEROL SULFATE 90 UG/1
2 AEROSOL, METERED RESPIRATORY (INHALATION) EVERY 4 HOURS PRN
COMMUNITY
Start: 2021-09-01

## 2023-05-19 RX ORDER — PANTOPRAZOLE SODIUM 40 MG/1
40 TABLET, DELAYED RELEASE ORAL
COMMUNITY
Start: 2020-10-15

## 2023-05-19 RX ORDER — IBUPROFEN 200 MG
600 CAPSULE ORAL 2 TIMES DAILY
COMMUNITY

## 2023-06-05 ENCOUNTER — NURSE TRIAGE (OUTPATIENT)
Dept: OTHER | Facility: CLINIC | Age: 43
End: 2023-06-05

## 2023-06-05 ENCOUNTER — TELEPHONE (OUTPATIENT)
Age: 43
End: 2023-06-05

## 2023-06-05 NOTE — TELEPHONE ENCOUNTER
[place]    Received call from [facility] with [employer] Complaint. Subjective: Caller states \"Low blood pressure - 79/49\"     Current Symptoms:   Bariatric surgery 5-6 months ago  Low blood pressure-noted to be slowly dropping over the last week. 79/49 on Friday, Saturday morning was 69/43. Did not seek emergency medical care at the time because BP was \"bouncing around\", at one point being 110/?. Dizziness, nausea, heart palpitations and chest pain when BP was so low. Advised pt to seek emergency medical care should BP drop so low again, discussed the risks of hypotension especially when symptomatic. Pt verbalized understanding. Stopped taking blood pressure medication (Lisinopril 40mg) Friday night. Pt primarily calling to inquire about what to do should her BP start increasing again. 118/63 currently, feeling well. Hx brain surgery July 2022, PEs, 2 blood transfusions in the last 6 months    Onset: 1 week    Associated Symptoms:  ADLs normal    Pain Severity: 0/10 currently     Temperature: Denies    What has been tried: Nothing    LMP:  2 weeks ago   Pregnant: No    Recommended disposition: Go to ED/UCC Now (Or to Office with PCP Approval)    Care advice provided, patient verbalizes understanding; denies any other questions or concerns; instructed to call back for any new or worsening symptoms. Writer provided warm transfer to [facility] at [facility] for 2nd level triage    Attention Provider: Thank you for allowing me to participate in the care of your patient. The patient was connected to triage in response to information provided to the ECC/PSC. Please do not respond through this encounter as the response is not directed to a shared pool.   Reason for Disposition   Patient sounds very sick or weak to the triager    Protocols used: Blood Pressure - Low-ADULT-[place]

## 2023-06-05 NOTE — TELEPHONE ENCOUNTER
[phone]    Pt stopped taking her Lisinopril because her BP is getting very low, it was 75/46 at the lowest. Once she stopped taking the medicine, it became 128/68.  She wants to know what she should be doing

## 2023-07-15 DIAGNOSIS — E78.00 PURE HYPERCHOLESTEROLEMIA, UNSPECIFIED: ICD-10-CM

## 2023-07-17 RX ORDER — ATORVASTATIN CALCIUM 40 MG/1
40 TABLET, FILM COATED ORAL DAILY
Qty: 90 TABLET | Refills: 1 | OUTPATIENT
Start: 2023-07-17

## 2023-07-20 ENCOUNTER — OFFICE VISIT (OUTPATIENT)
Age: 43
End: 2023-07-20
Payer: MEDICAID

## 2023-07-20 VITALS
RESPIRATION RATE: 17 BRPM | HEIGHT: 62 IN | TEMPERATURE: 98.6 F | SYSTOLIC BLOOD PRESSURE: 130 MMHG | BODY MASS INDEX: 31.87 KG/M2 | OXYGEN SATURATION: 98 % | DIASTOLIC BLOOD PRESSURE: 78 MMHG | HEART RATE: 62 BPM | WEIGHT: 173.2 LBS

## 2023-07-20 DIAGNOSIS — E78.2 MODERATE MIXED HYPERLIPIDEMIA NOT REQUIRING STATIN THERAPY: ICD-10-CM

## 2023-07-20 DIAGNOSIS — E61.1 LOW IRON: Primary | ICD-10-CM

## 2023-07-20 PROCEDURE — 99214 OFFICE O/P EST MOD 30 MIN: CPT | Performed by: FAMILY MEDICINE

## 2023-07-20 PROCEDURE — 3074F SYST BP LT 130 MM HG: CPT | Performed by: FAMILY MEDICINE

## 2023-07-20 PROCEDURE — 3078F DIAST BP <80 MM HG: CPT | Performed by: FAMILY MEDICINE

## 2023-07-20 RX ORDER — ATORVASTATIN CALCIUM 40 MG/1
40 TABLET, FILM COATED ORAL DAILY
Qty: 30 TABLET | Refills: 0 | Status: SHIPPED | OUTPATIENT
Start: 2023-07-20

## 2023-07-20 SDOH — ECONOMIC STABILITY: INCOME INSECURITY: HOW HARD IS IT FOR YOU TO PAY FOR THE VERY BASICS LIKE FOOD, HOUSING, MEDICAL CARE, AND HEATING?: NOT HARD AT ALL

## 2023-07-20 SDOH — ECONOMIC STABILITY: HOUSING INSECURITY
IN THE LAST 12 MONTHS, WAS THERE A TIME WHEN YOU DID NOT HAVE A STEADY PLACE TO SLEEP OR SLEPT IN A SHELTER (INCLUDING NOW)?: NO

## 2023-07-20 SDOH — ECONOMIC STABILITY: FOOD INSECURITY: WITHIN THE PAST 12 MONTHS, YOU WORRIED THAT YOUR FOOD WOULD RUN OUT BEFORE YOU GOT MONEY TO BUY MORE.: NEVER TRUE

## 2023-07-20 SDOH — ECONOMIC STABILITY: FOOD INSECURITY: WITHIN THE PAST 12 MONTHS, THE FOOD YOU BOUGHT JUST DIDN'T LAST AND YOU DIDN'T HAVE MONEY TO GET MORE.: NEVER TRUE

## 2023-07-20 NOTE — PROGRESS NOTES
Identified pt with two pt identifiers(name and ). Chief Complaint   Patient presents with    Medication Refill     Patient is here for medication refills     Discuss Labs     Patient is needing fasting labs and will need orders to go to [employer] Maintenance Due   Topic    Varicella vaccine (1 of 2 - 2-dose childhood series)    Hepatitis B vaccine (1 of 3 - Risk 3-dose series)    Diabetic retinal exam     COVID-19 Vaccine (3 - Booster for Moderna series)    Diabetic foot exam     Lipids        Wt Readings from Last 3 Encounters:   23 201 lb (91.2 kg)   22 231 lb (104.8 kg)   22 230 lb (104.3 kg)     Temp Readings from Last 3 Encounters:   No data found for Temp     BP Readings from Last 3 Encounters:   23 138/80   22 136/80   22 124/72     Pulse Readings from Last 3 Encounters:   23 74   22 72   22 72           Depression Screening:  :     PHQ-9 Questionaire 2023   Little interest or pleasure in doing things 0 0 0   Feeling down, depressed, or hopeless 0 0 0   PHQ-9 Total Score 0 0 0        Fall Risk Assessment:  :   No flowsheet data found. Abuse Screening:  :   No flowsheet data found. Coordination of Care Questionnaire:  :     1. \"Have you been to the ER, urgent care clinic since your last visit? Hospitalized since your last visit? \" no    2. \"Have you seen or consulted any other health care providers outside of the [address] [place] [address] since your last visit? \" no     3. This patient is accompanied in the office by her self. 4. For patients aged 43-73: Has the patient had a colonoscopy / FIT/ Cologuard? NA - based on age      If the patient is female:    11. For patients aged 43-66: Has the patient had a mammogram within the past 2 years? Yes - no Care Gap present      6. For patients aged 21-65: Has the patient had a pap smear?  Yes - no Care Gap present

## 2023-08-04 LAB
ALBUMIN SERPL-MCNC: 4.4 G/DL (ref 3.9–4.9)
ALBUMIN/GLOB SERPL: 1.7 {RATIO} (ref 1.2–2.2)
ALP SERPL-CCNC: 75 IU/L (ref 44–121)
ALT SERPL-CCNC: 12 IU/L (ref 0–32)
AST SERPL-CCNC: 15 IU/L (ref 0–40)
BASOPHILS # BLD AUTO: 0 X10E3/UL (ref 0–0.2)
BASOPHILS NFR BLD AUTO: 0 %
BILIRUB SERPL-MCNC: 0.5 MG/DL (ref 0–1.2)
BUN SERPL-MCNC: 11 MG/DL (ref 6–24)
BUN/CREAT SERPL: 17 (ref 9–23)
CALCIUM SERPL-MCNC: 9.3 MG/DL (ref 8.7–10.2)
CHLORIDE SERPL-SCNC: 104 MMOL/L (ref 96–106)
CO2 SERPL-SCNC: 25 MMOL/L (ref 20–29)
CREAT SERPL-MCNC: 0.64 MG/DL (ref 0.57–1)
EGFRCR SERPLBLD CKD-EPI 2021: 114 ML/MIN/1.73
EOSINOPHIL # BLD AUTO: 0.1 X10E3/UL (ref 0–0.4)
EOSINOPHIL NFR BLD AUTO: 2 %
ERYTHROCYTE [DISTWIDTH] IN BLOOD BY AUTOMATED COUNT: 13.3 % (ref 11.7–15.4)
GLOBULIN SER CALC-MCNC: 2.6 G/DL (ref 1.5–4.5)
GLUCOSE SERPL-MCNC: 84 MG/DL (ref 70–99)
HCT VFR BLD AUTO: 42.9 % (ref 34–46.6)
HGB BLD-MCNC: 13.7 G/DL (ref 11.1–15.9)
IMM GRANULOCYTES # BLD AUTO: 0 X10E3/UL (ref 0–0.1)
IMM GRANULOCYTES NFR BLD AUTO: 0 %
IRON SATN MFR SERPL: 12 % (ref 15–55)
IRON SERPL-MCNC: 34 UG/DL (ref 27–159)
LYMPHOCYTES # BLD AUTO: 1.8 X10E3/UL (ref 0.7–3.1)
LYMPHOCYTES NFR BLD AUTO: 27 %
MCH RBC QN AUTO: 25.9 PG (ref 26.6–33)
MCHC RBC AUTO-ENTMCNC: 31.9 G/DL (ref 31.5–35.7)
MCV RBC AUTO: 81 FL (ref 79–97)
MONOCYTES # BLD AUTO: 0.4 X10E3/UL (ref 0.1–0.9)
MONOCYTES NFR BLD AUTO: 6 %
NEUTROPHILS # BLD AUTO: 4.4 X10E3/UL (ref 1.4–7)
NEUTROPHILS NFR BLD AUTO: 65 %
PLATELET # BLD AUTO: 290 X10E3/UL (ref 150–450)
POTASSIUM SERPL-SCNC: 3.9 MMOL/L (ref 3.5–5.2)
PROT SERPL-MCNC: 7 G/DL (ref 6–8.5)
RBC # BLD AUTO: 5.29 X10E6/UL (ref 3.77–5.28)
SODIUM SERPL-SCNC: 141 MMOL/L (ref 134–144)
TIBC SERPL-MCNC: 288 UG/DL (ref 250–450)
UIBC SERPL-MCNC: 254 UG/DL (ref 131–425)
WBC # BLD AUTO: 6.7 X10E3/UL (ref 3.4–10.8)

## 2023-08-23 DIAGNOSIS — E78.2 MODERATE MIXED HYPERLIPIDEMIA NOT REQUIRING STATIN THERAPY: ICD-10-CM

## 2023-08-23 RX ORDER — ATORVASTATIN CALCIUM 40 MG/1
40 TABLET, FILM COATED ORAL DAILY
Qty: 30 TABLET | Refills: 0 | Status: SHIPPED | OUTPATIENT
Start: 2023-08-23

## 2023-08-29 ENCOUNTER — TELEPHONE (OUTPATIENT)
Age: 43
End: 2023-08-29

## 2023-08-29 NOTE — TELEPHONE ENCOUNTER
[address] [place] [address] sent fax for refill of SUFAMETHOXOLE-TMP DS TAB substituted for Bactrim DS  Qty-14  Take 1 tab by mouth two times a day for 7 days    [address] [facility], [address] [place] [address] [phone]

## 2023-09-01 ENCOUNTER — OFFICE VISIT (OUTPATIENT)
Age: 43
End: 2023-09-01
Payer: MEDICAID

## 2023-09-01 VITALS
BODY MASS INDEX: 29.92 KG/M2 | HEART RATE: 55 BPM | DIASTOLIC BLOOD PRESSURE: 72 MMHG | RESPIRATION RATE: 17 BRPM | WEIGHT: 162.6 LBS | SYSTOLIC BLOOD PRESSURE: 137 MMHG | HEIGHT: 62 IN | TEMPERATURE: 98.2 F | OXYGEN SATURATION: 100 %

## 2023-09-01 DIAGNOSIS — N11.9: ICD-10-CM

## 2023-09-01 DIAGNOSIS — R30.0 DYSURIA: ICD-10-CM

## 2023-09-01 DIAGNOSIS — R80.1 PERSISTENT PROTEINURIA: Primary | ICD-10-CM

## 2023-09-01 DIAGNOSIS — E78.2 MODERATE MIXED HYPERLIPIDEMIA NOT REQUIRING STATIN THERAPY: ICD-10-CM

## 2023-09-01 LAB
BILIRUBIN, URINE, POC: NEGATIVE
BLOOD URINE, POC: NEGATIVE
GLUCOSE URINE, POC: NEGATIVE
GLUCOSE, POC: 84 MG/DL
KETONES, URINE, POC: NORMAL
LEUKOCYTE ESTERASE, URINE, POC: NORMAL
NITRITE, URINE, POC: NEGATIVE
PH, URINE, POC: 5.5 (ref 4.6–8)
PROTEIN,URINE, POC: NORMAL
SPECIFIC GRAVITY, URINE, POC: 1.02 (ref 1–1.03)
URINALYSIS CLARITY, POC: NORMAL
URINALYSIS COLOR, POC: YELLOW
UROBILINOGEN, POC: NORMAL

## 2023-09-01 PROCEDURE — 99214 OFFICE O/P EST MOD 30 MIN: CPT | Performed by: FAMILY MEDICINE

## 2023-09-01 PROCEDURE — PBSHW AMB POC URINALYSIS DIP STICK AUTO W/ MICRO: Performed by: FAMILY MEDICINE

## 2023-09-01 PROCEDURE — 81001 URINALYSIS AUTO W/SCOPE: CPT | Performed by: FAMILY MEDICINE

## 2023-09-01 PROCEDURE — 3074F SYST BP LT 130 MM HG: CPT | Performed by: FAMILY MEDICINE

## 2023-09-01 PROCEDURE — 82947 ASSAY GLUCOSE BLOOD QUANT: CPT | Performed by: FAMILY MEDICINE

## 2023-09-01 PROCEDURE — 3078F DIAST BP <80 MM HG: CPT | Performed by: FAMILY MEDICINE

## 2023-09-01 PROCEDURE — PBSHW AMB POC GLUCOSE, QUANTITATIVE, BLOOD: Performed by: FAMILY MEDICINE

## 2023-09-01 RX ORDER — ATORVASTATIN CALCIUM 40 MG/1
40 TABLET, FILM COATED ORAL DAILY
Qty: 30 TABLET | Refills: 0 | Status: CANCELLED | OUTPATIENT
Start: 2023-09-01

## 2023-09-02 LAB
APPEARANCE UR: ABNORMAL
APPEARANCE UR: CLEAR
BACTERIA URNS QL MICRO: ABNORMAL /HPF
BACTERIA URNS QL MICRO: ABNORMAL /HPF
BILIRUB UR QL: NEGATIVE
BILIRUB UR QL: NEGATIVE
COLOR UR: ABNORMAL
COLOR UR: ABNORMAL
EPITH CASTS URNS QL MICRO: ABNORMAL /LPF
EPITH CASTS URNS QL MICRO: ABNORMAL /LPF
GLUCOSE UR STRIP.AUTO-MCNC: NEGATIVE MG/DL
GLUCOSE UR STRIP.AUTO-MCNC: NEGATIVE MG/DL
HGB UR QL STRIP: NEGATIVE
HGB UR QL STRIP: NEGATIVE
KETONES UR QL STRIP.AUTO: 40 MG/DL
KETONES UR QL STRIP.AUTO: 40 MG/DL
LEUKOCYTE ESTERASE UR QL STRIP.AUTO: ABNORMAL
LEUKOCYTE ESTERASE UR QL STRIP.AUTO: ABNORMAL
MUCOUS THREADS URNS QL MICRO: ABNORMAL /LPF
MUCOUS THREADS URNS QL MICRO: ABNORMAL /LPF
NITRITE UR QL STRIP.AUTO: NEGATIVE
NITRITE UR QL STRIP.AUTO: NEGATIVE
PH UR STRIP: 5.5 (ref 5–8)
PH UR STRIP: 5.5 (ref 5–8)
PROT UR STRIP-MCNC: ABNORMAL MG/DL
PROT UR STRIP-MCNC: ABNORMAL MG/DL
RBC #/AREA URNS HPF: ABNORMAL /HPF (ref 0–5)
RBC #/AREA URNS HPF: ABNORMAL /HPF (ref 0–5)
SP GR UR REFRACTOMETRY: 1.02 (ref 1–1.03)
SP GR UR REFRACTOMETRY: 1.02 (ref 1–1.03)
URINE CULTURE IF INDICATED: ABNORMAL
UROBILINOGEN UR QL STRIP.AUTO: 0.2 EU/DL (ref 0.2–1)
UROBILINOGEN UR QL STRIP.AUTO: 0.2 EU/DL (ref 0.2–1)
WBC URNS QL MICRO: ABNORMAL /HPF (ref 0–4)
WBC URNS QL MICRO: ABNORMAL /HPF (ref 0–4)

## 2023-09-04 RX ORDER — SULFAMETHOXAZOLE AND TRIMETHOPRIM 800; 160 MG/1; MG/1
1 TABLET ORAL 2 TIMES DAILY
Qty: 10 TABLET | Refills: 0 | Status: SHIPPED | OUTPATIENT
Start: 2023-09-04 | End: 2023-09-09

## 2023-09-04 NOTE — PROGRESS NOTES
SUBJECTIVE: Luke Gordillo is a 40 y.o. female who complains of urinary frequency, urgency and dysuria x 6 days, without flank pain, fever, chills, or abnormal vaginal discharge or bleeding. OBJECTIVE: Appears well, in no apparent distress. Vital signs are normal. The abdomen is soft without tenderness, guarding, mass, rebound or organomegaly. No CVA tenderness or inguinal adenopathy noted. Urine dipstick shows positive for protein and positive for ketones. Micro exam: not done. ASSESSMENT: UTI uncomplicated without evidence of pyelonephritis    PLAN: Treatment per orders - also push fluids, may use Pyridium OTC prn. Call or return to clinic prn if these symptoms worsen or fail to improve as anticipated. 1. Persistent proteinuria    - Urinalysis with Microscopic; Future  - Urinalysis with Microscopic    2. Dysuria    - AMB POC URINALYSIS DIP STICK AUTO W/ MICRO  - UA W/Microscopic, Rfx to Culture ([facility] Default); Future  - AMB POC GLUCOSE, QUANTITATIVE, BLOOD  - UA W/Microscopic, Rfx to Culture ([facility] Default)    3. Moderate mixed hyperlipidemia not requiring statin therapy    - AMB POC GLUCOSE, QUANTITATIVE, BLOOD    4.  Idiopathic chronic interstitial nephritis    Rashad Carpio MD

## 2023-09-04 NOTE — RESULT ENCOUNTER NOTE
You are growing a few G negative bacteria in your urine, I will send over an antibiotic to your pharmacy this afternoon. Please take it with plenty of water.

## 2023-09-05 ENCOUNTER — TELEPHONE (OUTPATIENT)
Age: 43
End: 2023-09-05

## 2023-09-05 LAB
BACTERIA SPEC CULT: ABNORMAL
CC UR VC: ABNORMAL
SERVICE CMNT-IMP: ABNORMAL

## 2023-09-05 NOTE — TELEPHONE ENCOUNTER
----- Message from Efrain Ly MD sent at 9/4/2023  9:28 AM EDT -----  You are growing a few G negative bacteria in your urine, I will send over an antibiotic to your pharmacy this afternoon. Please take it with plenty of water.

## 2023-09-28 DIAGNOSIS — E78.2 MODERATE MIXED HYPERLIPIDEMIA NOT REQUIRING STATIN THERAPY: ICD-10-CM

## 2023-09-28 NOTE — TELEPHONE ENCOUNTER
atorvastatin (LIPITOR) 40 MG tablet    [facility] [phone]

## 2023-09-29 ENCOUNTER — TELEPHONE (OUTPATIENT)
Age: 43
End: 2023-09-29

## 2023-09-29 DIAGNOSIS — D50.9 IRON DEFICIENCY ANEMIA, UNSPECIFIED IRON DEFICIENCY ANEMIA TYPE: ICD-10-CM

## 2023-09-29 DIAGNOSIS — R30.0 DYSURIA: Primary | ICD-10-CM

## 2023-09-29 DIAGNOSIS — E53.8 B12 DEFICIENCY: ICD-10-CM

## 2023-09-29 RX ORDER — ATORVASTATIN CALCIUM 40 MG/1
40 TABLET, FILM COATED ORAL DAILY
Qty: 120 TABLET | Refills: 3 | Status: SHIPPED | OUTPATIENT
Start: 2023-09-29

## 2023-09-29 NOTE — TELEPHONE ENCOUNTER
9/29/2023  11:11 AM      1. Dysuria      2. B12 deficiency      3.  Iron deficiency anemia, unspecified iron deficiency anemia type      Patricia Kim MD

## 2024-04-08 ENCOUNTER — OFFICE VISIT (OUTPATIENT)
Age: 44
End: 2024-04-08
Payer: COMMERCIAL

## 2024-04-08 ENCOUNTER — NURSE ONLY (OUTPATIENT)
Age: 44
End: 2024-04-08

## 2024-04-08 VITALS
DIASTOLIC BLOOD PRESSURE: 68 MMHG | TEMPERATURE: 98.9 F | BODY MASS INDEX: 29.59 KG/M2 | WEIGHT: 160.8 LBS | HEIGHT: 62 IN | SYSTOLIC BLOOD PRESSURE: 132 MMHG | RESPIRATION RATE: 16 BRPM | HEART RATE: 69 BPM | OXYGEN SATURATION: 98 %

## 2024-04-08 DIAGNOSIS — E11.9 TYPE 2 DIABETES MELLITUS WITHOUT COMPLICATION, WITHOUT LONG-TERM CURRENT USE OF INSULIN (HCC): Primary | ICD-10-CM

## 2024-04-08 DIAGNOSIS — K21.9 GASTROESOPHAGEAL REFLUX DISEASE WITHOUT ESOPHAGITIS: ICD-10-CM

## 2024-04-08 DIAGNOSIS — E78.5 HYPERLIPIDEMIA, UNSPECIFIED HYPERLIPIDEMIA TYPE: ICD-10-CM

## 2024-04-08 DIAGNOSIS — E11.9 TYPE 2 DIABETES MELLITUS WITHOUT COMPLICATION, WITHOUT LONG-TERM CURRENT USE OF INSULIN (HCC): ICD-10-CM

## 2024-04-08 DIAGNOSIS — Z23 ENCOUNTER FOR IMMUNIZATION: ICD-10-CM

## 2024-04-08 DIAGNOSIS — E66.3 OVERWEIGHT (BMI 25.0-29.9): ICD-10-CM

## 2024-04-08 LAB
ALBUMIN SERPL-MCNC: 3.5 G/DL (ref 3.5–5)
ALBUMIN/GLOB SERPL: 1 (ref 1.1–2.2)
ALP SERPL-CCNC: 71 U/L (ref 45–117)
ALT SERPL-CCNC: 17 U/L (ref 12–78)
ANION GAP SERPL CALC-SCNC: 6 MMOL/L (ref 5–15)
AST SERPL-CCNC: 13 U/L (ref 15–37)
BASOPHILS # BLD: 0 K/UL (ref 0–0.1)
BASOPHILS NFR BLD: 1 % (ref 0–1)
BILIRUB SERPL-MCNC: 0.5 MG/DL (ref 0.2–1)
BUN SERPL-MCNC: 10 MG/DL (ref 6–20)
BUN/CREAT SERPL: 19 (ref 12–20)
CALCIUM SERPL-MCNC: 9.1 MG/DL (ref 8.5–10.1)
CHLORIDE SERPL-SCNC: 107 MMOL/L (ref 97–108)
CHOLEST SERPL-MCNC: 166 MG/DL
CO2 SERPL-SCNC: 27 MMOL/L (ref 21–32)
CREAT SERPL-MCNC: 0.52 MG/DL (ref 0.55–1.02)
CREAT UR-MCNC: 102 MG/DL
DIFFERENTIAL METHOD BLD: ABNORMAL
EOSINOPHIL # BLD: 0.1 K/UL (ref 0–0.4)
EOSINOPHIL NFR BLD: 1 % (ref 0–7)
ERYTHROCYTE [DISTWIDTH] IN BLOOD BY AUTOMATED COUNT: 14.2 % (ref 11.5–14.5)
EST. AVERAGE GLUCOSE BLD GHB EST-MCNC: 100 MG/DL
GLOBULIN SER CALC-MCNC: 3.5 G/DL (ref 2–4)
GLUCOSE SERPL-MCNC: 86 MG/DL (ref 65–100)
HBA1C MFR BLD: 5 %
HBA1C MFR BLD: 5.1 % (ref 4–5.6)
HCT VFR BLD AUTO: 42.4 % (ref 35–47)
HDLC SERPL-MCNC: 54 MG/DL
HDLC SERPL: 3.1 (ref 0–5)
HGB BLD-MCNC: 13.3 G/DL (ref 11.5–16)
IMM GRANULOCYTES # BLD AUTO: 0 K/UL (ref 0–0.04)
IMM GRANULOCYTES NFR BLD AUTO: 1 % (ref 0–0.5)
LDLC SERPL CALC-MCNC: 95.2 MG/DL (ref 0–100)
LDLC SERPL DIRECT ASSAY-MCNC: 94 MG/DL (ref 0–100)
LYMPHOCYTES # BLD: 1.8 K/UL (ref 0.8–3.5)
LYMPHOCYTES NFR BLD: 28 % (ref 12–49)
MCH RBC QN AUTO: 26.3 PG (ref 26–34)
MCHC RBC AUTO-ENTMCNC: 31.4 G/DL (ref 30–36.5)
MCV RBC AUTO: 84 FL (ref 80–99)
MICROALBUMIN UR-MCNC: 10.4 MG/DL
MICROALBUMIN/CREAT UR-RTO: 102 MG/G (ref 0–30)
MONOCYTES # BLD: 0.3 K/UL (ref 0–1)
MONOCYTES NFR BLD: 5 % (ref 5–13)
NEUTS SEG # BLD: 4.2 K/UL (ref 1.8–8)
NEUTS SEG NFR BLD: 64 % (ref 32–75)
NRBC # BLD: 0 K/UL (ref 0–0.01)
NRBC BLD-RTO: 0 PER 100 WBC
PLATELET # BLD AUTO: 285 K/UL (ref 150–400)
PMV BLD AUTO: 10.5 FL (ref 8.9–12.9)
POTASSIUM SERPL-SCNC: 4.1 MMOL/L (ref 3.5–5.1)
PROT SERPL-MCNC: 7 G/DL (ref 6.4–8.2)
RBC # BLD AUTO: 5.05 M/UL (ref 3.8–5.2)
SODIUM SERPL-SCNC: 140 MMOL/L (ref 136–145)
TRIGL SERPL-MCNC: 84 MG/DL
VLDLC SERPL CALC-MCNC: 16.8 MG/DL
WBC # BLD AUTO: 6.5 K/UL (ref 3.6–11)

## 2024-04-08 PROCEDURE — 3075F SYST BP GE 130 - 139MM HG: CPT | Performed by: FAMILY MEDICINE

## 2024-04-08 PROCEDURE — 3078F DIAST BP <80 MM HG: CPT | Performed by: FAMILY MEDICINE

## 2024-04-08 PROCEDURE — 90471 IMMUNIZATION ADMIN: CPT | Performed by: FAMILY MEDICINE

## 2024-04-08 PROCEDURE — 99214 OFFICE O/P EST MOD 30 MIN: CPT | Performed by: FAMILY MEDICINE

## 2024-04-08 PROCEDURE — 83036 HEMOGLOBIN GLYCOSYLATED A1C: CPT | Performed by: FAMILY MEDICINE

## 2024-04-08 PROCEDURE — 90677 PCV20 VACCINE IM: CPT | Performed by: FAMILY MEDICINE

## 2024-04-08 RX ORDER — OMEPRAZOLE 40 MG/1
CAPSULE, DELAYED RELEASE ORAL
COMMUNITY

## 2024-04-08 RX ORDER — ACYCLOVIR 400 MG/1
TABLET ORAL
Qty: 1 EACH | Refills: 0 | Status: SHIPPED | OUTPATIENT
Start: 2024-04-08

## 2024-04-08 ASSESSMENT — PATIENT HEALTH QUESTIONNAIRE - PHQ9
SUM OF ALL RESPONSES TO PHQ QUESTIONS 1-9: 0
1. LITTLE INTEREST OR PLEASURE IN DOING THINGS: NOT AT ALL
SUM OF ALL RESPONSES TO PHQ9 QUESTIONS 1 & 2: 0
2. FEELING DOWN, DEPRESSED OR HOPELESS: NOT AT ALL
SUM OF ALL RESPONSES TO PHQ QUESTIONS 1-9: 0

## 2024-04-08 NOTE — PROGRESS NOTES
[facility] [address] [place] [address]  Phone: [phone]  Fax: [phone]        Chief Complaint   Patient presents with    [facility] Care     Pt new to provider. She kayli like a prescription for Dexacom. She has been having low blood sugars.      She is a 43 y.o. female who presents for [facility] care.    Presents for DM follow up. Taking all medications as directed with no side effects.  Following DM diet as best as possible.  Denies polyuria, polydipsia, polyphagia.  Blood sugar checks: usually below 120s.    She follows with a dietician.  Says that her blood sugars drop at night into 60-70s.  Knows how to cope with these episodes.  Asking for dexcom to make her testing easier in the face of this. Says that she also gets occasional angioedema when she sticks her finger traditionally.    She has a history of gastric bypass surgery.  She is following with [facility] for this.  Seeing GI and nutritionist through this group as well.    Prior to Visit Medications    Medication Sig Taking? Authorizing Provider   Continuous Blood Gluc  (DEXCOM G7 ) JAKUB Use as directed for type 2 diabetes (E11.65) with frequent hypoglycemia. Yes Albin Miller MD   Continuous Blood Gluc Sensor (DEXCOM G7 SENSOR) MISC Use as directed for type 2 diabetes (E11.65) with frequent hypoglycemia. Yes Albin Miller MD   atorvastatin (LIPITOR) 40 MG tablet Take 1 tablet by mouth daily Yes Ivette Zacarias MD   albuterol sulfate HFA (PROVENTIL;VENTOLIN;PROAIR) 108 (90 Base) MCG/ACT inhaler Inhale 2 puffs into the lungs every 4 hours as needed Yes Automatic Reconciliation, Ar   calcium citrate (CALCITRATE) 950 (200 Ca) MG tablet Take 3 tablets by mouth 2 times daily Yes Automatic Reconciliation, Ar   cetirizine (ZYRTEC) 10 MG tablet Take 1 tablet by mouth daily Yes Automatic Reconciliation, Ar   vitamin D (CHOLECALCIFEROL) 25 MCG (1000 UT) TABS tablet Take 3 tablets by mouth daily Yes Automatic

## 2024-04-08 NOTE — PROGRESS NOTES
Identified pt with two pt identifiers(name and ).    Chief Complaint   Patient presents with    Establish Care     Pt new to provider. She kayli like a prescription for Dexacom. She has been having low blood sugars.         Health Maintenance Due   Topic    Hepatitis B vaccine (1 of 3 - 3-dose series)    Varicella vaccine (1 of 2 - 2-dose childhood series)    Diabetic retinal exam     Diabetic foot exam     Pneumococcal 0-64 years Vaccine (2 of 2 - PCV)    Lipids     A1C test (Diabetic or Prediabetic)     COVID-19 Vaccine (3 - 2023-24 season)    Diabetic Alb to Cr ratio (uACR) test     Depression Screen        Wt Readings from Last 3 Encounters:   24 72.9 kg (160 lb 12.8 oz)   23 73.8 kg (162 lb 9.6 oz)   23 78.6 kg (173 lb 3.2 oz)     Temp Readings from Last 3 Encounters:   24 98.9 °F (37.2 °C) (Temporal)   23 98.2 °F (36.8 °C) (Temporal)   23 98.6 °F (37 °C) (Temporal)     BP Readings from Last 3 Encounters:   24 132/68   23 137/72   23 130/78     Pulse Readings from Last 3 Encounters:   24 69   23 55   23 62           Depression Screening:  :         2024     3:03 PM 2023     9:00 AM 2022     2:00 PM 2022    10:00 AM   PHQ-9 Questionaire   Little interest or pleasure in doing things 0 0 0 0   Feeling down, depressed, or hopeless 0 0 0 0   PHQ-9 Total Score 0 0 0 0        Fall Risk Assessment:  :          No data to display                 Abuse Screening:  :          No data to display                 Coordination of Care Questionnaire:  :     \"Have you been to the ER, urgent care clinic since your last visit?  Hospitalized since your last visit?\"    NO    “Have you seen or consulted any other health care providers outside of [facility] since your last visit?”    NO

## 2024-04-09 LAB — SPECIMEN HOLD: NORMAL

## 2024-04-16 ENCOUNTER — TELEMEDICINE (OUTPATIENT)
Age: 44
End: 2024-04-16
Payer: COMMERCIAL

## 2024-04-16 DIAGNOSIS — R80.9 TYPE 2 DIABETES MELLITUS WITH MICROALBUMINURIA (HCC): Primary | ICD-10-CM

## 2024-04-16 DIAGNOSIS — E11.29 TYPE 2 DIABETES MELLITUS WITH MICROALBUMINURIA (HCC): Primary | ICD-10-CM

## 2024-04-16 PROCEDURE — 99213 OFFICE O/P EST LOW 20 MIN: CPT | Performed by: FAMILY MEDICINE

## 2024-04-16 PROCEDURE — 3044F HG A1C LEVEL LT 7.0%: CPT | Performed by: FAMILY MEDICINE

## 2024-04-16 RX ORDER — LISINOPRIL 2.5 MG/1
2.5 TABLET ORAL DAILY
Qty: 90 TABLET | Refills: 1 | Status: SHIPPED | OUTPATIENT
Start: 2024-04-16

## 2024-04-16 NOTE — PROGRESS NOTES
[facility]  [address] [place] [address]  Phone: [phone]  Fax: [phone]      Ofe Escobar, was evaluated through a synchronous (real-time) audio-video encounter. The patient (or guardian if applicable) is aware that this is a billable service, which includes applicable co-pays. This Virtual Visit was conducted with patient's (and/or legal guardian's) consent. Patient identification was verified, and a caregiver was present when appropriate.   The patient was located at Home: [address] [place] [id number]  Provider was located at Facility (Appt Dept): [address] [place] [address]  Confirm you are appropriately licensed, registered, or certified to deliver care in the state where the patient is located as indicated above. If you are not or unsure, please re-schedule the visit: Yes, I confirm.       Chief Complaint   Patient presents with    Discuss Labs     Microalbuminuria.     She is a 43 y.o. female who presents for lab discussion. Established with me on 4/8.  Labs were checked that day and she was found to have microalbuminuria.  She has had this for \"years\".  Always told she was dehydrated.  Reports that she was on lisinopril until recently (says 10mg), until she stopped due to low Bps.    Reviewed PmHx, RxHx, FmHx, SocHx, AllgHx and updated and dated in the chart.      Objective:   There were no vitals filed for this visit.  Physical Examination:    Physical Exam  Nursing note reviewed.   Constitutional:       General: She is not in acute distress.     Appearance: Normal appearance.   HENT:      Head: Normocephalic.   Pulmonary:      Effort: Pulmonary effort is normal. No respiratory distress.   Musculoskeletal:      Cervical back: Normal range of motion.   Neurological:      Mental Status: She is alert and oriented to person, place, and time.   Psychiatric:         Mood and Affect: Mood normal.         Behavior: Behavior normal.

## 2024-07-16 ENCOUNTER — OFFICE VISIT (OUTPATIENT)
Age: 44
End: 2024-07-16
Payer: COMMERCIAL

## 2024-07-16 VITALS
OXYGEN SATURATION: 98 % | RESPIRATION RATE: 16 BRPM | HEIGHT: 62 IN | HEART RATE: 59 BPM | BODY MASS INDEX: 27.46 KG/M2 | DIASTOLIC BLOOD PRESSURE: 78 MMHG | WEIGHT: 149.2 LBS | TEMPERATURE: 98.8 F | SYSTOLIC BLOOD PRESSURE: 144 MMHG

## 2024-07-16 DIAGNOSIS — E66.3 OVERWEIGHT (BMI 25.0-29.9): ICD-10-CM

## 2024-07-16 DIAGNOSIS — R40.4 TRANSIENT ALTERATION OF AWARENESS: Primary | ICD-10-CM

## 2024-07-16 DIAGNOSIS — Z86.69 HISTORY OF CHIARI MALFORMATION: ICD-10-CM

## 2024-07-16 PROCEDURE — 99214 OFFICE O/P EST MOD 30 MIN: CPT | Performed by: FAMILY MEDICINE

## 2024-07-16 PROCEDURE — 3078F DIAST BP <80 MM HG: CPT | Performed by: FAMILY MEDICINE

## 2024-07-16 PROCEDURE — 3077F SYST BP >= 140 MM HG: CPT | Performed by: FAMILY MEDICINE

## 2024-07-16 ASSESSMENT — PATIENT HEALTH QUESTIONNAIRE - PHQ9
SUM OF ALL RESPONSES TO PHQ QUESTIONS 1-9: 0
2. FEELING DOWN, DEPRESSED OR HOPELESS: NOT AT ALL
SUM OF ALL RESPONSES TO PHQ QUESTIONS 1-9: 0
1. LITTLE INTEREST OR PLEASURE IN DOING THINGS: NOT AT ALL
SUM OF ALL RESPONSES TO PHQ QUESTIONS 1-9: 0
SUM OF ALL RESPONSES TO PHQ QUESTIONS 1-9: 0
SUM OF ALL RESPONSES TO PHQ9 QUESTIONS 1 & 2: 0

## 2024-07-16 NOTE — PROGRESS NOTES
[facility]  [address] [place] [address]  Phone: [phone]  Fax: [phone]        Chief Complaint   Patient presents with    Other     Pt c/o of pressure in her head where her jaw kept snapping. She also states during the episode her muscles were tensing and she urinated on her self.      She is a 43 y.o. female who presents for acute visit.    Says that 3 days ago she started to get a headache/pressure build up in behind her right eyes.  Started feeling pulsing in her brain.  Went inside and felt things were \"hazy\".  Says she sat down.  Felt her jaw snapping.  Says that she remembers the entire episode.  There was no loss of consciousness.  Felt like muscles were spasming.  Was told that she was repeating herself frequently.  Reports that this has happened to her 2x before (once when she was using marijuana).  She was incontinent of urine.  Says she went to bed after staying up for an hour.  Says she woke up the following day and felt normally.    She has a history of chiari malformation surgery in June 2022.    Called her neurosurgeon after her episode and she was told to follow up with PCP.  Follows with [facility] in [place].    Says that 1 month ago she was working in the yard and had a log fell on her head.  [place] to that (not supposed to do that).    Prior to Visit Medications    Medication Sig Taking? Authorizing Provider   lisinopril (PRINIVIL;ZESTRIL) 2.5 MG tablet Take 1 tablet by mouth daily Yes Albin Miller MD   omeprazole (PRILOSEC) 40 MG delayed release capsule TAKE 1 (ONE) CAPSULE BY MOUTH TWICE DAILY DO NOT CRUSH OR CHEW Yes ProviderDavid MD   Continuous Blood Gluc  (DEXCOM G7 ) JAKUB Use as directed for type 2 diabetes (E11.65) with frequent hypoglycemia. Yes Albin Miller MD   Continuous Blood Gluc Sensor (DEXCOM G7 SENSOR) MISC Use as directed for type 2 diabetes (E11.65)

## 2024-07-16 NOTE — PROGRESS NOTES
Identified pt with two pt identifiers(name and ).    Chief Complaint   Patient presents with    Other     Pt c/o of pressure in her head where her jaw kept snapping. She also states during the episode her muscles were tensing and she urinated on her self.         Health Maintenance Due   Topic    Hepatitis B vaccine (1 of 3 - 3-dose series)    Varicella vaccine (1 of 2 - 2-dose childhood series)    Diabetic retinal exam     COVID-19 Vaccine (3 - 2023-24 season)    Breast cancer screen        Wt Readings from Last 3 Encounters:   24 67.7 kg (149 lb 3.2 oz)   24 72.9 kg (160 lb 12.8 oz)   23 73.8 kg (162 lb 9.6 oz)     Temp Readings from Last 3 Encounters:   24 98.8 °F (37.1 °C) (Temporal)   24 98.9 °F (37.2 °C) (Temporal)   23 98.2 °F (36.8 °C) (Temporal)     BP Readings from Last 3 Encounters:   24 (!) 144/78   24 132/68   23 137/72     Pulse Readings from Last 3 Encounters:   24 59   24 69   23 55           Depression Screening:  :         2024     3:26 PM 2024     3:03 PM 2023     9:00 AM 2022     2:00 PM 2022    10:00 AM   PHQ-9 Questionaire   Little interest or pleasure in doing things 0 0 0 0 0   Feeling down, depressed, or hopeless 0 0 0 0 0   PHQ-9 Total Score 0 0 0 0 0        Fall Risk Assessment:  :          No data to display                 Abuse Screening:  :          No data to display                 Coordination of Care Questionnaire:  :     \"Have you been to the ER, urgent care clinic since your last visit?  Hospitalized since your last visit?\"    NO    “Have you seen or consulted any other health care providers outside of [facility] since your last visit?”    NO    Have you had a mammogram?”   NO    Date of last Mammogram: 6/10/2022             Click Here for Release of Records Request

## 2024-07-24 ENCOUNTER — HOSPITAL ENCOUNTER (OUTPATIENT)
Facility: HOSPITAL | Age: 44
Discharge: HOME | End: 2024-07-27
Attending: FAMILY MEDICINE
Payer: COMMERCIAL

## 2024-07-24 DIAGNOSIS — R40.4 TRANSIENT ALTERATION OF AWARENESS: ICD-10-CM

## 2024-07-24 PROCEDURE — 70553 MRI BRAIN STEM W/O & W/DYE: CPT

## 2024-07-24 PROCEDURE — 6360000004 HC RX CONTRAST MEDICATION: Performed by: FAMILY MEDICINE

## 2024-07-24 PROCEDURE — A9579 GAD-BASE MR CONTRAST NOS,1ML: HCPCS | Performed by: FAMILY MEDICINE

## 2024-07-24 RX ADMIN — GADOTERIDOL 13 ML: 279.3 INJECTION, SOLUTION INTRAVENOUS at 15:52

## 2024-07-30 ENCOUNTER — TELEPHONE (OUTPATIENT)
Age: 44
End: 2024-07-30

## 2024-07-30 NOTE — TELEPHONE ENCOUNTER
----- Message from Jose E Spear sent at 7/30/2024  9:05 AM EDT -----  Regarding: ECC Results Request  ECC Results Request    Which lab or imaging result is the patient calling about: MRI Result     Which provider ordered the test? DR. Miller, Albin VYAS MD    Was this a [facility] Provider: No    Date the test was preformed (MM/NAVJOT/YYYY): July 24, 2024  --------------------------------------------------------------------------------------------------------------------------    Relationship to Patient: Self     Call Back Info: OK to leave message on voicemail  Preferred Call Back Number: Phone:  [phone]

## 2024-09-09 ENCOUNTER — TELEPHONE (OUTPATIENT)
Age: 44
End: 2024-09-09

## 2024-09-09 NOTE — TELEPHONE ENCOUNTER
Returned the patients call to reschedule a New Patient appointment for Seizures due to going to the [facility] location. Informed patient the next available date would be Sept. 30th in the morning and the patient stated it needed to be a afternoon appointment. Next I provided her with Monday October 7th at 2 pm and she declined and stated she no longer wanted the appointment and stated if she have another seizure its on us.

## 2024-09-09 NOTE — TELEPHONE ENCOUNTER
Requesting to r/s new pt appt. Stated she went to the [place] office because that is the address she was sent.

## 2024-10-11 DIAGNOSIS — E78.2 MODERATE MIXED HYPERLIPIDEMIA NOT REQUIRING STATIN THERAPY: ICD-10-CM

## 2024-10-14 RX ORDER — ATORVASTATIN CALCIUM 40 MG/1
40 TABLET, FILM COATED ORAL DAILY
Qty: 90 TABLET | Refills: 1 | Status: SHIPPED | OUTPATIENT
Start: 2024-10-14

## 2024-10-25 ENCOUNTER — OFFICE VISIT (OUTPATIENT)
Age: 44
End: 2024-10-25

## 2024-10-25 ENCOUNTER — LAB (OUTPATIENT)
Age: 44
End: 2024-10-25

## 2024-10-25 VITALS
DIASTOLIC BLOOD PRESSURE: 70 MMHG | HEART RATE: 70 BPM | HEIGHT: 62 IN | WEIGHT: 153 LBS | SYSTOLIC BLOOD PRESSURE: 130 MMHG | OXYGEN SATURATION: 98 % | TEMPERATURE: 98 F | BODY MASS INDEX: 28.16 KG/M2 | RESPIRATION RATE: 16 BRPM

## 2024-10-25 DIAGNOSIS — L50.9 URTICARIA: ICD-10-CM

## 2024-10-25 DIAGNOSIS — E11.29 TYPE 2 DIABETES MELLITUS WITH MICROALBUMINURIA (HCC): ICD-10-CM

## 2024-10-25 DIAGNOSIS — E78.5 HYPERLIPIDEMIA, UNSPECIFIED HYPERLIPIDEMIA TYPE: ICD-10-CM

## 2024-10-25 DIAGNOSIS — Z23 ENCOUNTER FOR IMMUNIZATION: ICD-10-CM

## 2024-10-25 DIAGNOSIS — E66.3 OVERWEIGHT (BMI 25.0-29.9): ICD-10-CM

## 2024-10-25 DIAGNOSIS — I10 ESSENTIAL (PRIMARY) HYPERTENSION: Primary | ICD-10-CM

## 2024-10-25 DIAGNOSIS — Z86.69 HISTORY OF CHIARI MALFORMATION: ICD-10-CM

## 2024-10-25 DIAGNOSIS — Z12.31 ENCOUNTER FOR SCREENING MAMMOGRAM FOR MALIGNANT NEOPLASM OF BREAST: ICD-10-CM

## 2024-10-25 DIAGNOSIS — R80.9 TYPE 2 DIABETES MELLITUS WITH MICROALBUMINURIA (HCC): ICD-10-CM

## 2024-10-25 LAB
EST. AVERAGE GLUCOSE BLD GHB EST-MCNC: 100 MG/DL
HBA1C MFR BLD: 5.1 % (ref 4–5.6)

## 2024-10-25 RX ORDER — TRIAMCINOLONE ACETONIDE 1 MG/G
CREAM TOPICAL 2 TIMES DAILY PRN
Qty: 80 G | Refills: 3 | Status: SHIPPED | OUTPATIENT
Start: 2024-10-25

## 2024-10-25 SDOH — ECONOMIC STABILITY: INCOME INSECURITY: HOW HARD IS IT FOR YOU TO PAY FOR THE VERY BASICS LIKE FOOD, HOUSING, MEDICAL CARE, AND HEATING?: NOT HARD AT ALL

## 2024-10-25 SDOH — ECONOMIC STABILITY: FOOD INSECURITY: WITHIN THE PAST 12 MONTHS, YOU WORRIED THAT YOUR FOOD WOULD RUN OUT BEFORE YOU GOT MONEY TO BUY MORE.: NEVER TRUE

## 2024-10-25 SDOH — ECONOMIC STABILITY: FOOD INSECURITY: WITHIN THE PAST 12 MONTHS, THE FOOD YOU BOUGHT JUST DIDN'T LAST AND YOU DIDN'T HAVE MONEY TO GET MORE.: NEVER TRUE

## 2024-10-25 ASSESSMENT — PATIENT HEALTH QUESTIONNAIRE - PHQ9
2. FEELING DOWN, DEPRESSED OR HOPELESS: NOT AT ALL
1. LITTLE INTEREST OR PLEASURE IN DOING THINGS: NOT AT ALL
SUM OF ALL RESPONSES TO PHQ QUESTIONS 1-9: 0
SUM OF ALL RESPONSES TO PHQ QUESTIONS 1-9: 0
SUM OF ALL RESPONSES TO PHQ9 QUESTIONS 1 & 2: 0
SUM OF ALL RESPONSES TO PHQ QUESTIONS 1-9: 0
SUM OF ALL RESPONSES TO PHQ QUESTIONS 1-9: 0

## 2024-10-25 NOTE — PROGRESS NOTES
[facility] [address] [place] [address]  Phone: [phone]  Fax: [phone]        Chief Complaint   Patient presents with    Follow-up    Diabetes    Blood Pressure Check    Headache     From previous accident that is starting to hurt again.      She is a 44 y.o. female who presents for follow up visit.    Presents for HTN follow up.  Taking medications as prescribed and reports no side effects.  Denies chest pain, headache, visual disturbances.  Does not check BP at home.    Presents for DM follow up. Taking all medications as directed with no side effects.  Following DM diet as best as possible.  Denies polyuria, polydipsia, polyphagia.  Blood sugar checks: 150s post prandial.  Also notes that her BG gets a little low at night--sometimes into 50s-60s.    She was complaining of headaches last week. Hurt along with right side of her temple.  This was the same place where she had TBI.  Had some headache and dizziness.  Says she had severe pain anytime she touched her head.  Has not contacted her neurologist about this.  Has not established with a neurologist since she was referred after last visit with me.  She has a history of chiari malformation.    Prior to Visit Medications    Medication Sig Taking? Authorizing Provider   triamcinolone (KENALOG) 0.1 % cream Apply topically 2 times daily as needed (utercia) Yes Albin Miller MD   atorvastatin (LIPITOR) 40 MG tablet TAKE 1 TABLET BY MOUTH DAILY Yes Albin Miller MD   lisinopril (PRINIVIL;ZESTRIL) 2.5 MG tablet Take 1 tablet by mouth daily Yes Albin Miller MD   omeprazole (PRILOSEC) 40 MG delayed release capsule TAKE 1 (ONE) CAPSULE BY MOUTH TWICE DAILY DO NOT CRUSH OR CHEW Yes ProviderDavid MD   Continuous Blood Gluc  (DEXCOM G7 ) JAKUB Use as directed for type 2 diabetes (E11.65) with frequent hypoglycemia. Yes Albin Miller MD   Continuous Blood Gluc Sensor (DEXCOM G7 SENSOR) MISC Use as directed

## 2024-10-25 NOTE — PROGRESS NOTES
Identified pt with two pt identifiers(name and )    Chief Complaint   Patient presents with    Follow-up    Diabetes    Blood Pressure Check    Headache     From previous accident that is starting to hurt again.         Health Maintenance Due   Topic    Varicella vaccine (1 of  - 13+ 2-dose series)    Hepatitis B vaccine (1 of 3 - 19+ 3-dose series)    Diabetic retinal exam     Breast cancer screen     Flu vaccine (1)    COVID-19 Vaccine (3 - 2023-24 season)       Wt Readings from Last 3 Encounters:   10/25/24 69.4 kg (153 lb)   24 67.7 kg (149 lb 3.2 oz)   24 72.9 kg (160 lb 12.8 oz)     Temp Readings from Last 3 Encounters:   10/25/24 98 °F (36.7 °C) (Temporal)   24 98.8 °F (37.1 °C) (Temporal)   24 98.9 °F (37.2 °C) (Temporal)     BP Readings from Last 3 Encounters:   10/25/24 130/70   24 (!) 144/78   24 132/68     Pulse Readings from Last 3 Encounters:   10/25/24 70   24 59   24 69           Depression Screening:  :         10/25/2024     3:16 PM 2024     3:26 PM 2024     3:03 PM 2023     9:00 AM 2022     2:00 PM 2022    10:00 AM   PHQ-9 Questionaire   Little interest or pleasure in doing things 0 0 0 0 0 0   Feeling down, depressed, or hopeless 0 0 0 0 0 0   PHQ-9 Total Score 0 0 0 0 0 0        Fall Risk Assessment:  :          No data to display                 Abuse Screening:  :          No data to display                 Coordination of Care Questionnaire:  :     \"Have you been to the ER, urgent care clinic since your last visit?  Hospitalized since your last visit?\"    NO    “Have you seen or consulted any other health care providers outside our system since your last visit?”    NO    Have you had a mammogram?”   NO    Date of last Mammogram: 6/10/2022       “Have you had a diabetic eye exam?”    NO appt scheduled.      Date of last diabetic eye exam: 2020       Click Here for Release of Records Request

## 2024-11-05 ENCOUNTER — TELEPHONE (OUTPATIENT)
Age: 44
End: 2024-11-05

## 2024-11-05 NOTE — TELEPHONE ENCOUNTER
lisinopril (PRINIVIL;ZESTRIL) 2.5 MG tablet    [place], [place] - [facility] [address] - P [phone] - F [phone]

## 2024-11-06 DIAGNOSIS — E11.29 TYPE 2 DIABETES MELLITUS WITH MICROALBUMINURIA (HCC): ICD-10-CM

## 2024-11-06 DIAGNOSIS — R80.9 TYPE 2 DIABETES MELLITUS WITH MICROALBUMINURIA (HCC): ICD-10-CM

## 2024-11-06 RX ORDER — LISINOPRIL 2.5 MG/1
2.5 TABLET ORAL DAILY
Qty: 90 TABLET | Refills: 1 | Status: SHIPPED | OUTPATIENT
Start: 2024-11-06

## 2024-11-06 NOTE — TELEPHONE ENCOUNTER
lisinopril (PRINIVIL;ZESTRIL) 2.5 MG tablet     [facility] [place], [place] - [facility] [address] P [phone] - F [phone]          Note

## 2025-01-28 ENCOUNTER — TELEPHONE (OUTPATIENT)
Age: 45
End: 2025-01-28

## 2025-01-28 NOTE — TELEPHONE ENCOUNTER
Patient had a glucose monitor sent in a while back but it was too expensive to . Pt was wondering can we send in a different one such as freestyle alexis 2 ?    [facility] - [place], [place] - [facility] [address] - [place] [phone] - F [phone]

## 2025-01-30 ENCOUNTER — COMMUNITY OUTREACH (OUTPATIENT)
Age: 45
End: 2025-01-30

## 2025-01-30 NOTE — PROGRESS NOTES
Patient's HM shows they are overdue for Mammogram.  Care Everywhere and  files searched.  No results to attach to order nor HM updated.     Orders placed 10/2024, no results on file.

## 2025-02-18 ENCOUNTER — APPOINTMENT (OUTPATIENT)
Facility: HOSPITAL | Age: 45
DRG: 377 | End: 2025-02-18
Payer: COMMERCIAL

## 2025-02-18 ENCOUNTER — HOSPITAL ENCOUNTER (INPATIENT)
Facility: HOSPITAL | Age: 45
LOS: 1 days | Discharge: HOME | DRG: 377 | End: 2025-02-19
Attending: EMERGENCY MEDICINE | Admitting: FAMILY MEDICINE
Payer: COMMERCIAL

## 2025-02-18 DIAGNOSIS — R11.2 INTRACTABLE NAUSEA AND VOMITING: ICD-10-CM

## 2025-02-18 DIAGNOSIS — R07.9 CHEST PAIN, UNSPECIFIED TYPE: ICD-10-CM

## 2025-02-18 DIAGNOSIS — I16.0 HYPERTENSIVE URGENCY: ICD-10-CM

## 2025-02-18 DIAGNOSIS — R93.89 ABNORMAL FINDING ON CT SCAN: ICD-10-CM

## 2025-02-18 DIAGNOSIS — R79.89 ELEVATED TROPONIN: ICD-10-CM

## 2025-02-18 DIAGNOSIS — R10.13 ABDOMINAL PAIN, EPIGASTRIC: Primary | ICD-10-CM

## 2025-02-18 LAB
ALBUMIN SERPL-MCNC: 3.6 G/DL (ref 3.5–5)
ALBUMIN/GLOB SERPL: 0.9 (ref 1.1–2.2)
ALP SERPL-CCNC: 78 U/L (ref 45–117)
ALT SERPL-CCNC: 18 U/L (ref 12–78)
ANION GAP SERPL CALC-SCNC: 6 MMOL/L (ref 2–12)
APPEARANCE UR: CLEAR
AST SERPL-CCNC: 12 U/L (ref 15–37)
BACTERIA URNS QL MICRO: NEGATIVE /HPF
BASOPHILS # BLD: 0.02 K/UL (ref 0–0.1)
BASOPHILS NFR BLD: 0.2 % (ref 0–1)
BILIRUB SERPL-MCNC: 0.5 MG/DL (ref 0.2–1)
BILIRUB UR QL: NEGATIVE
BUN SERPL-MCNC: 13 MG/DL (ref 6–20)
BUN/CREAT SERPL: 25 (ref 12–20)
CALCIUM SERPL-MCNC: 8.8 MG/DL (ref 8.5–10.1)
CHLORIDE SERPL-SCNC: 101 MMOL/L (ref 97–108)
CO2 SERPL-SCNC: 28 MMOL/L (ref 21–32)
COLOR UR: ABNORMAL
CREAT SERPL-MCNC: 0.52 MG/DL (ref 0.55–1.02)
DIFFERENTIAL METHOD BLD: ABNORMAL
EOSINOPHIL # BLD: 0.04 K/UL (ref 0–0.4)
EOSINOPHIL NFR BLD: 0.4 % (ref 0–7)
EPITH CASTS URNS QL MICRO: ABNORMAL /LPF
ERYTHROCYTE [DISTWIDTH] IN BLOOD BY AUTOMATED COUNT: 13.8 % (ref 11.5–14.5)
FLUAV RNA SPEC QL NAA+PROBE: NOT DETECTED
FLUBV RNA SPEC QL NAA+PROBE: NOT DETECTED
GLOBULIN SER CALC-MCNC: 4.2 G/DL (ref 2–4)
GLUCOSE SERPL-MCNC: 122 MG/DL (ref 65–100)
GLUCOSE UR STRIP.AUTO-MCNC: NEGATIVE MG/DL
HCG UR QL: NEGATIVE
HCT VFR BLD AUTO: 43.1 % (ref 35–47)
HGB BLD-MCNC: 13.7 G/DL (ref 11.5–16)
HGB UR QL STRIP: ABNORMAL
IMM GRANULOCYTES # BLD AUTO: 0.03 K/UL (ref 0–0.04)
IMM GRANULOCYTES NFR BLD AUTO: 0.3 % (ref 0–0.5)
KETONES UR QL STRIP.AUTO: 40 MG/DL
LEUKOCYTE ESTERASE UR QL STRIP.AUTO: NEGATIVE
LIPASE SERPL-CCNC: 56 U/L (ref 13–75)
LYMPHOCYTES # BLD: 0.86 K/UL (ref 0.8–3.5)
LYMPHOCYTES NFR BLD: 9.6 % (ref 12–49)
MCH RBC QN AUTO: 25.8 PG (ref 26–34)
MCHC RBC AUTO-ENTMCNC: 31.8 G/DL (ref 30–36.5)
MCV RBC AUTO: 81 FL (ref 80–99)
MONOCYTES # BLD: 0.22 K/UL (ref 0–1)
MONOCYTES NFR BLD: 2.4 % (ref 5–13)
NEUTS SEG # BLD: 7.81 K/UL (ref 1.8–8)
NEUTS SEG NFR BLD: 87.1 % (ref 32–75)
NITRITE UR QL STRIP.AUTO: NEGATIVE
NRBC # BLD: 0 K/UL (ref 0–0.01)
NRBC BLD-RTO: 0 PER 100 WBC
PH UR STRIP: 6 (ref 5–8)
PLATELET # BLD AUTO: 252 K/UL (ref 150–400)
PMV BLD AUTO: 10.3 FL (ref 8.9–12.9)
POTASSIUM SERPL-SCNC: 4.4 MMOL/L (ref 3.5–5.1)
PROT SERPL-MCNC: 7.8 G/DL (ref 6.4–8.2)
PROT UR STRIP-MCNC: >300 MG/DL
RBC # BLD AUTO: 5.32 M/UL (ref 3.8–5.2)
RBC #/AREA URNS HPF: ABNORMAL /HPF (ref 0–5)
SARS-COV-2 RNA RESP QL NAA+PROBE: DETECTED
SODIUM SERPL-SCNC: 135 MMOL/L (ref 136–145)
SOURCE: ABNORMAL
SP GR UR REFRACTOMETRY: 1.03 (ref 1–1.03)
TROPONIN I SERPL HS-MCNC: 21 NG/L (ref 0–51)
TROPONIN I SERPL HS-MCNC: 43 NG/L (ref 0–51)
UROBILINOGEN UR QL STRIP.AUTO: 0.2 EU/DL (ref 0.2–1)
WBC # BLD AUTO: 9 K/UL (ref 3.6–11)
WBC URNS QL MICRO: ABNORMAL /HPF (ref 0–4)

## 2025-02-18 PROCEDURE — 83690 ASSAY OF LIPASE: CPT

## 2025-02-18 PROCEDURE — 6360000004 HC RX CONTRAST MEDICATION

## 2025-02-18 PROCEDURE — 71046 X-RAY EXAM CHEST 2 VIEWS: CPT

## 2025-02-18 PROCEDURE — 85025 COMPLETE CBC W/AUTO DIFF WBC: CPT

## 2025-02-18 PROCEDURE — 96374 THER/PROPH/DIAG INJ IV PUSH: CPT

## 2025-02-18 PROCEDURE — 74177 CT ABD & PELVIS W/CONTRAST: CPT

## 2025-02-18 PROCEDURE — 96375 TX/PRO/DX INJ NEW DRUG ADDON: CPT

## 2025-02-18 PROCEDURE — 99285 EMERGENCY DEPT VISIT HI MDM: CPT

## 2025-02-18 PROCEDURE — 81025 URINE PREGNANCY TEST: CPT

## 2025-02-18 PROCEDURE — 2580000003 HC RX 258

## 2025-02-18 PROCEDURE — 6360000002 HC RX W HCPCS

## 2025-02-18 PROCEDURE — 6370000000 HC RX 637 (ALT 250 FOR IP)

## 2025-02-18 PROCEDURE — 80053 COMPREHEN METABOLIC PANEL: CPT

## 2025-02-18 PROCEDURE — 36415 COLL VENOUS BLD VENIPUNCTURE: CPT

## 2025-02-18 PROCEDURE — 81001 URINALYSIS AUTO W/SCOPE: CPT

## 2025-02-18 PROCEDURE — 87636 SARSCOV2 & INF A&B AMP PRB: CPT

## 2025-02-18 PROCEDURE — 1100000000 HC RM PRIVATE

## 2025-02-18 PROCEDURE — 84484 ASSAY OF TROPONIN QUANT: CPT

## 2025-02-18 PROCEDURE — 96361 HYDRATE IV INFUSION ADD-ON: CPT

## 2025-02-18 PROCEDURE — 93005 ELECTROCARDIOGRAM TRACING: CPT

## 2025-02-18 RX ORDER — DIPHENHYDRAMINE HYDROCHLORIDE 50 MG/ML
25 INJECTION INTRAMUSCULAR; INTRAVENOUS ONCE
Status: COMPLETED | OUTPATIENT
Start: 2025-02-18 | End: 2025-02-18

## 2025-02-18 RX ORDER — HYDRALAZINE HYDROCHLORIDE 20 MG/ML
20 INJECTION INTRAMUSCULAR; INTRAVENOUS ONCE
Status: DISCONTINUED | OUTPATIENT
Start: 2025-02-18 | End: 2025-02-18

## 2025-02-18 RX ORDER — TRAMADOL HYDROCHLORIDE 50 MG/1
50 TABLET ORAL ONCE
Status: COMPLETED | OUTPATIENT
Start: 2025-02-18 | End: 2025-02-18

## 2025-02-18 RX ORDER — IOPAMIDOL 755 MG/ML
100 INJECTION, SOLUTION INTRAVASCULAR
Status: COMPLETED | OUTPATIENT
Start: 2025-02-18 | End: 2025-02-18

## 2025-02-18 RX ORDER — PROCHLORPERAZINE EDISYLATE 5 MG/ML
10 INJECTION INTRAMUSCULAR; INTRAVENOUS ONCE
Status: COMPLETED | OUTPATIENT
Start: 2025-02-18 | End: 2025-02-18

## 2025-02-18 RX ORDER — 0.9 % SODIUM CHLORIDE 0.9 %
1000 INTRAVENOUS SOLUTION INTRAVENOUS ONCE
Status: COMPLETED | OUTPATIENT
Start: 2025-02-18 | End: 2025-02-18

## 2025-02-18 RX ORDER — TRAMADOL HYDROCHLORIDE 50 MG/1
50 TABLET ORAL
Status: CANCELLED | OUTPATIENT
Start: 2025-02-18 | End: 2025-02-18

## 2025-02-18 RX ORDER — HYDRALAZINE HYDROCHLORIDE 20 MG/ML
10 INJECTION INTRAMUSCULAR; INTRAVENOUS ONCE
Status: COMPLETED | OUTPATIENT
Start: 2025-02-18 | End: 2025-02-18

## 2025-02-18 RX ADMIN — IOPAMIDOL 100 ML: 755 INJECTION, SOLUTION INTRAVENOUS at 19:40

## 2025-02-18 RX ADMIN — SODIUM CHLORIDE 1000 ML: 0.9 INJECTION, SOLUTION INTRAVENOUS at 20:06

## 2025-02-18 RX ADMIN — LIDOCAINE HYDROCHLORIDE 40 ML: 20 SOLUTION ORAL at 18:27

## 2025-02-18 RX ADMIN — PROCHLORPERAZINE EDISYLATE 10 MG: 5 INJECTION INTRAMUSCULAR; INTRAVENOUS at 20:08

## 2025-02-18 RX ADMIN — TRAMADOL HYDROCHLORIDE 50 MG: 50 TABLET ORAL at 20:00

## 2025-02-18 RX ADMIN — DIPHENHYDRAMINE HYDROCHLORIDE 25 MG: 50 INJECTION INTRAMUSCULAR; INTRAVENOUS at 20:08

## 2025-02-18 RX ADMIN — HYDRALAZINE HYDROCHLORIDE 10 MG: 20 INJECTION INTRAMUSCULAR; INTRAVENOUS at 20:01

## 2025-02-18 ASSESSMENT — PAIN DESCRIPTION - DESCRIPTORS
DESCRIPTORS: ACHING;BURNING
DESCRIPTORS: BURNING
DESCRIPTORS: ACHING
DESCRIPTORS: ACHING;BURNING

## 2025-02-18 ASSESSMENT — PAIN DESCRIPTION - LOCATION
LOCATION: ABDOMEN;CHEST
LOCATION: ABDOMEN
LOCATION: ABDOMEN;CHEST
LOCATION: ABDOMEN;CHEST

## 2025-02-18 ASSESSMENT — PAIN DESCRIPTION - ORIENTATION
ORIENTATION: MID;UPPER
ORIENTATION: MID;UPPER

## 2025-02-18 ASSESSMENT — PAIN SCALES - GENERAL
PAINLEVEL_OUTOF10: 2
PAINLEVEL_OUTOF10: 4
PAINLEVEL_OUTOF10: 4
PAINLEVEL_OUTOF10: 7

## 2025-02-18 ASSESSMENT — PAIN - FUNCTIONAL ASSESSMENT
PAIN_FUNCTIONAL_ASSESSMENT: 0-10
PAIN_FUNCTIONAL_ASSESSMENT: 0-10
PAIN_FUNCTIONAL_ASSESSMENT: PREVENTS OR INTERFERES SOME ACTIVE ACTIVITIES AND ADLS
PAIN_FUNCTIONAL_ASSESSMENT: 0-10
PAIN_FUNCTIONAL_ASSESSMENT: 0-10

## 2025-02-18 NOTE — ED PROVIDER NOTES
diverticulosis/diverticulitis, IBS, mesenteric ischemia, toxic megacolon    Plan: labs, UA, urine preg, CT imaging, pain control (given 100 fentanyl via EMS), nausea control (given for IV Zofran and 4 ODT Zofran via EMS), GI cocktail, trop, EKG, reassess, PO challenge    On exam, epigastric tenderness noted, but no lower abdominal tenderness.  Patient lungs clear to auscultation bilaterally.  Patient blood pressure significantly elevated at 225/116.  States that she takes 2.5 of lisinopril daily for proteinuria.  10 mg IV hydralazine ordered.  Patient states no relief with GI cocktail.  Decreased suspicion for small bowel obstruction as the patient is having consistent bowel movements.  Suspicion for upper GI bleed based on melena and previous hx of this.  Urine preg negative. CT scan ordered and pending.  COVID-positive.  Flu negative. CT scan shows right adnexal corpus luteal versus hemorrhagic cyst with small amount of pelvic free fluid and small uterine fibroid and small left pleural effusion with left basilar atelectasis.  Patient without complaints of cough or shortness of breath and lungs clear on exam.  Patient afebrile and EKG showed sinus bradycardia without ST changes.  First troponin 21. Lipase normal. Second troponin ordered 2 hours later resulted in 43.  Patient still suffering from nausea and vomiting and epigastric pain and complaints of chest pain.  Decision to admit.  Patient vitals stable and BP now resolved to 188/80 after 10 of hydralazine and patient was also given Compazine, Benadryl, and fluids. Urine with significant protein greater than 300, however patient does take lisinopril 2.5 daily for proteinuria. Basic labs with minor abnormalities, none concerning for change of care or disposition.     Presentation, management, and disposition were discussed with the attending physician, Dr. Cintron who is in agreement with plan of care.     Perfect Serve Consult for Admission  9:43 PM    ED Room

## 2025-02-18 NOTE — ED TRIAGE NOTES
Reports epigastric pain times 1 week history of gastric sleeve 5 years ago received 4mg zofran ODT and 4mg zofran IV and total of 100mcg fentanyl IV

## 2025-02-19 VITALS
OXYGEN SATURATION: 96 % | RESPIRATION RATE: 15 BRPM | SYSTOLIC BLOOD PRESSURE: 143 MMHG | TEMPERATURE: 99.2 F | DIASTOLIC BLOOD PRESSURE: 79 MMHG | HEART RATE: 60 BPM

## 2025-02-19 LAB
ALBUMIN SERPL-MCNC: 3.2 G/DL (ref 3.5–5)
ALBUMIN/GLOB SERPL: 0.8 (ref 1.1–2.2)
ALP SERPL-CCNC: 68 U/L (ref 45–117)
ALT SERPL-CCNC: 19 U/L (ref 12–78)
ANION GAP SERPL CALC-SCNC: 6 MMOL/L (ref 2–12)
APTT PPP: 25.1 SEC (ref 22.1–31)
AST SERPL-CCNC: 29 U/L (ref 15–37)
BILIRUB SERPL-MCNC: 0.7 MG/DL (ref 0.2–1)
BUN SERPL-MCNC: 8 MG/DL (ref 6–20)
BUN/CREAT SERPL: 17 (ref 12–20)
CALCIUM SERPL-MCNC: 9.2 MG/DL (ref 8.5–10.1)
CHLORIDE SERPL-SCNC: 107 MMOL/L (ref 97–108)
CO2 SERPL-SCNC: 22 MMOL/L (ref 21–32)
CREAT SERPL-MCNC: 0.48 MG/DL (ref 0.55–1.02)
EKG ATRIAL RATE: 54 BPM
EKG ATRIAL RATE: 57 BPM
EKG ATRIAL RATE: 62 BPM
EKG DIAGNOSIS: NORMAL
EKG P AXIS: 59 DEGREES
EKG P AXIS: 63 DEGREES
EKG P AXIS: 71 DEGREES
EKG P-R INTERVAL: 144 MS
EKG P-R INTERVAL: 144 MS
EKG P-R INTERVAL: 148 MS
EKG Q-T INTERVAL: 432 MS
EKG Q-T INTERVAL: 442 MS
EKG Q-T INTERVAL: 450 MS
EKG QRS DURATION: 104 MS
EKG QTC CALCULATION (BAZETT): 409 MS
EKG QTC CALCULATION (BAZETT): 438 MS
EKG QTC CALCULATION (BAZETT): 448 MS
EKG R AXIS: 14 DEGREES
EKG R AXIS: 2 DEGREES
EKG R AXIS: 7 DEGREES
EKG T AXIS: 34 DEGREES
EKG T AXIS: 38 DEGREES
EKG T AXIS: 45 DEGREES
EKG VENTRICULAR RATE: 54 BPM
EKG VENTRICULAR RATE: 57 BPM
EKG VENTRICULAR RATE: 62 BPM
GLOBULIN SER CALC-MCNC: 4 G/DL (ref 2–4)
GLUCOSE BLD STRIP.AUTO-MCNC: 78 MG/DL (ref 65–117)
GLUCOSE BLD STRIP.AUTO-MCNC: 89 MG/DL (ref 65–117)
GLUCOSE SERPL-MCNC: 91 MG/DL (ref 65–100)
HCT VFR BLD AUTO: 42.2 % (ref 35–47)
HGB BLD-MCNC: 13.5 G/DL (ref 11.5–16)
INR PPP: 1 (ref 0.9–1.1)
POTASSIUM SERPL-SCNC: 4.3 MMOL/L (ref 3.5–5.1)
PROT SERPL-MCNC: 7.2 G/DL (ref 6.4–8.2)
PROTHROMBIN TIME: 10.9 SEC (ref 9.2–11.2)
SERVICE CMNT-IMP: NORMAL
SERVICE CMNT-IMP: NORMAL
SODIUM SERPL-SCNC: 135 MMOL/L (ref 136–145)
THERAPEUTIC RANGE: NORMAL SECS (ref 58–77)
TROPONIN I SERPL HS-MCNC: 101 NG/L (ref 0–51)
TROPONIN I SERPL HS-MCNC: 109 NG/L (ref 0–51)

## 2025-02-19 PROCEDURE — 93005 ELECTROCARDIOGRAM TRACING: CPT

## 2025-02-19 PROCEDURE — 93005 ELECTROCARDIOGRAM TRACING: CPT | Performed by: FAMILY MEDICINE

## 2025-02-19 PROCEDURE — 94761 N-INVAS EAR/PLS OXIMETRY MLT: CPT

## 2025-02-19 PROCEDURE — 85014 HEMATOCRIT: CPT

## 2025-02-19 PROCEDURE — 84484 ASSAY OF TROPONIN QUANT: CPT

## 2025-02-19 PROCEDURE — 80053 COMPREHEN METABOLIC PANEL: CPT

## 2025-02-19 PROCEDURE — 6360000002 HC RX W HCPCS

## 2025-02-19 PROCEDURE — 85018 HEMOGLOBIN: CPT

## 2025-02-19 PROCEDURE — 36415 COLL VENOUS BLD VENIPUNCTURE: CPT

## 2025-02-19 PROCEDURE — 85730 THROMBOPLASTIN TIME PARTIAL: CPT

## 2025-02-19 PROCEDURE — 2580000003 HC RX 258

## 2025-02-19 PROCEDURE — 85610 PROTHROMBIN TIME: CPT

## 2025-02-19 PROCEDURE — 93010 ELECTROCARDIOGRAM REPORT: CPT | Performed by: SPECIALIST

## 2025-02-19 PROCEDURE — 6370000000 HC RX 637 (ALT 250 FOR IP)

## 2025-02-19 PROCEDURE — 82962 GLUCOSE BLOOD TEST: CPT

## 2025-02-19 RX ORDER — VITAMIN B COMPLEX
3000 TABLET ORAL DAILY
Status: DISCONTINUED | OUTPATIENT
Start: 2025-02-19 | End: 2025-02-19 | Stop reason: HOSPADM

## 2025-02-19 RX ORDER — ACETAMINOPHEN 325 MG/1
650 TABLET ORAL EVERY 6 HOURS PRN
Status: DISCONTINUED | OUTPATIENT
Start: 2025-02-19 | End: 2025-02-19

## 2025-02-19 RX ORDER — FLUTICASONE PROPIONATE 50 MCG
2 SPRAY, SUSPENSION (ML) NASAL DAILY PRN
Status: DISCONTINUED | OUTPATIENT
Start: 2025-02-19 | End: 2025-02-19

## 2025-02-19 RX ORDER — SODIUM CHLORIDE 9 MG/ML
INJECTION, SOLUTION INTRAVENOUS PRN
Status: DISCONTINUED | OUTPATIENT
Start: 2025-02-19 | End: 2025-02-19 | Stop reason: HOSPADM

## 2025-02-19 RX ORDER — IBUPROFEN 200 MG
600 CAPSULE ORAL 2 TIMES DAILY
Status: DISCONTINUED | OUTPATIENT
Start: 2025-02-19 | End: 2025-02-19 | Stop reason: HOSPADM

## 2025-02-19 RX ORDER — ATORVASTATIN CALCIUM 20 MG/1
40 TABLET, FILM COATED ORAL NIGHTLY
Status: DISCONTINUED | OUTPATIENT
Start: 2025-02-19 | End: 2025-02-19 | Stop reason: HOSPADM

## 2025-02-19 RX ORDER — ONDANSETRON 2 MG/ML
4 INJECTION INTRAMUSCULAR; INTRAVENOUS EVERY 6 HOURS PRN
Status: DISCONTINUED | OUTPATIENT
Start: 2025-02-19 | End: 2025-02-19 | Stop reason: HOSPADM

## 2025-02-19 RX ORDER — ONDANSETRON 4 MG/1
4 TABLET, ORALLY DISINTEGRATING ORAL EVERY 8 HOURS PRN
Status: DISCONTINUED | OUTPATIENT
Start: 2025-02-19 | End: 2025-02-19 | Stop reason: HOSPADM

## 2025-02-19 RX ORDER — SODIUM CHLORIDE 0.9 % (FLUSH) 0.9 %
5-40 SYRINGE (ML) INJECTION PRN
Status: DISCONTINUED | OUTPATIENT
Start: 2025-02-19 | End: 2025-02-19 | Stop reason: HOSPADM

## 2025-02-19 RX ORDER — ACETAMINOPHEN 650 MG/1
650 SUPPOSITORY RECTAL EVERY 6 HOURS PRN
Status: DISCONTINUED | OUTPATIENT
Start: 2025-02-19 | End: 2025-02-19

## 2025-02-19 RX ORDER — SODIUM CHLORIDE 0.9 % (FLUSH) 0.9 %
5-40 SYRINGE (ML) INJECTION EVERY 12 HOURS SCHEDULED
Status: DISCONTINUED | OUTPATIENT
Start: 2025-02-19 | End: 2025-02-19 | Stop reason: HOSPADM

## 2025-02-19 RX ORDER — LISINOPRIL 5 MG/1
2.5 TABLET ORAL DAILY
Status: DISCONTINUED | OUTPATIENT
Start: 2025-02-19 | End: 2025-02-19 | Stop reason: HOSPADM

## 2025-02-19 RX ADMIN — Medication 3000 UNITS: at 09:39

## 2025-02-19 RX ADMIN — SODIUM CHLORIDE 80 MG: 9 INJECTION INTRAMUSCULAR; INTRAVENOUS; SUBCUTANEOUS at 00:41

## 2025-02-19 RX ADMIN — Medication 600 MG: at 01:07

## 2025-02-19 RX ADMIN — LISINOPRIL 2.5 MG: 5 TABLET ORAL at 09:38

## 2025-02-19 NOTE — ED NOTES
TRANSFER - OUT REPORT:    Verbal report given to [facility] ER on Ofe Escobar  being transferred to [facility] for routine progression of patient care       Report consisted of patient's Situation, Background, Assessment and   Recommendations(SBAR).     Information from the following report(s) ED SBAR, Adult Overview, Intake/Output, MAR, Recent Results, and Cardiac Rhythm NSR  was reviewed with the receiving nurse.    [place] Fall Assessment:    Presents to emergency department  because of falls (Syncope, seizure, or loss of consciousness): No  Age > 70: No  Altered Mental Status, Intoxication with alcohol or substance confusion (Disorientation, impaired judgment, poor safety awaremess, or inability to follow instructions): No  Impaired Mobility: Ambulates or transfers with assistive devices or assistance; Unable to ambulate or transer.: No  Nursing Judgement: No          Lines:   Peripheral IV 02/18/25 Left Antecubital (Active)        Opportunity for questions and clarification was provided.      Patient transported with:  Monitor, Patient awake and alert, PIV SL. VSS. Patient cleared for transfer.

## 2025-02-19 NOTE — ED NOTES
TRANSFER - OUT REPORT:    Verbal report given to [facility] transport on Ofe Escobar  being transferred to [place] ER for routine progression of patient care       Report consisted of patient's Situation, Background, Assessment and   Recommendations(SBAR).     Information from the following report(s) ED SBAR, Adult Overview, Intake/Output, MAR, Recent Results, and Cardiac Rhythm NSR  was reviewed with the receiving nurse.    [place] Fall Assessment:    Presents to emergency department  because of falls (Syncope, seizure, or loss of consciousness): No  Age > 70: No  Altered Mental Status, Intoxication with alcohol or substance confusion (Disorientation, impaired judgment, poor safety awaremess, or inability to follow instructions): No  Impaired Mobility: Ambulates or transfers with assistive devices or assistance; Unable to ambulate or transer.: No  Nursing Judgement: No          Lines:   Peripheral IV 02/18/25 Left Antecubital (Active)        Opportunity for questions and clarification was provided.      Patient transported with:  Monitor, PIV SL, Patient awake and alert, vss, patient cleared for transfer, family updated

## 2025-02-19 NOTE — ED NOTES
Family Practice Resident at bedside to address pt request to leave.  Pt signs AMA form and understands risks of leaving.  Pt leaves ambulatory and reports she will follow up with GI out patient.

## 2025-02-19 NOTE — ED NOTES
Pt reports she \"wants to leave now\".  Pt expresses worry concerning traveling in the snow storm.  Pt also reports she talked to GI and \"already told them she wants to follow-up out patient and just wants to go home now\".

## 2025-02-19 NOTE — PROGRESS NOTES
[address] [place] [address]   Office [phone]  Fax [phone]          Subjective / Objective     Subjective  Overnight Events: none  Patient seen and examined at bedside. Denies abdominal pain. Denies BM. Denies CP, SOB, N/V, dysuria.    Respiratory: RA  /78   Pulse 67   Temp 98.2 °F (36.8 °C) (Oral)   Resp 12   SpO2 97%    Physical Examination:   General appearance - alert, well appearing, and in no distress  Chest - clear to auscultation, no wheezes, rales or rhonchi, symmetric air entry  Heart - normal rate, regular rhythm, normal S1, S2, no murmurs, rubs, clicks or gallops,   Abdomen - soft, nontender, nondistended, no masses or organomegaly  Neurological - alert, oriented, normal speech, no focal findings  Skin - warm, dry. No notable rashes  Extremities - peripheral pulses normal, no pedal edema, no clubbing or cyanosis  Psychiatric - normal speech and thought processes    I/O:  02/18 0701 - 02/19 0700  In: 1000   Out: -   Inpatient Medications  Current Facility-Administered Medications   Medication Dose Route Frequency    atorvastatin (LIPITOR) tablet 40 mg  40 mg Oral Nightly    calcium citrate (CALCITRATE) tablet 600 mg  600 mg Oral BID    lisinopril (PRINIVIL;ZESTRIL) tablet 2.5 mg  2.5 mg Oral Daily    Vitamin D (CHOLECALCIFEROL) tablet 3,000 Units  3,000 Units Oral Daily    sodium chloride flush 0.9 % injection 5-40 mL  5-40 mL IntraVENous 2 times per day    sodium chloride flush 0.9 % injection 5-40 mL  5-40 mL IntraVENous PRN    0.9 % sodium chloride infusion   IntraVENous PRN    ondansetron (ZOFRAN-ODT) disintegrating tablet 4 mg  4 mg Oral Q8H PRN    Or    ondansetron (ZOFRAN) injection 4 mg  4 mg IntraVENous Q6H PRN    pantoprazole (PROTONIX) 40 mg in sodium chloride (PF) 0.9 % 10 mL injection  40 mg IntraVENous Q12H     Current Outpatient Medications   Medication Sig    lisinopril (PRINIVIL;ZESTRIL) 2.5 MG tablet Take 1 tablet by mouth daily    triamcinolone

## 2025-02-19 NOTE — ED NOTES
This RN reached out to the Family Practice team to notify them pt wants to leave.      The team reports they will come down now to talk to pt.

## 2025-02-19 NOTE — CONSULTS
Protime 10.9 9.2 - 11.2 sec   APTT    Collection Time: 02/19/25  2:52 AM   Result Value Ref Range    APTT 25.1 22.1 - 31.0 sec    Therapeutic Range   58.0 - 77.0 SECS   Troponin    Collection Time: 02/19/25  2:52 AM   Result Value Ref Range    Troponin, High Sensitivity 109 (H) 0 - 51 ng/L   Hemoglobin and Hematocrit    Collection Time: 02/19/25  2:52 AM   Result Value Ref Range    Hemoglobin 13.5 11.5 - 16.0 g/dL    Hematocrit 42.2 35.0 - 47.0 %   EKG 12 Lead    Collection Time: 02/19/25  3:18 AM   Result Value Ref Range    Ventricular Rate 62 BPM    Atrial Rate 62 BPM    P-R Interval 144 ms    QRS Duration 104 ms    Q-T Interval 442 ms    QTc Calculation (Bazett) 448 ms    P Axis 63 degrees    R Axis 7 degrees    T Axis 38 degrees    Diagnosis Normal sinus rhythm  Normal ECG      EKG 12 Lead    Collection Time: 02/19/25  3:19 AM   Result Value Ref Range    Ventricular Rate 57 BPM    Atrial Rate 57 BPM    P-R Interval 144 ms    QRS Duration 104 ms    Q-T Interval 450 ms    QTc Calculation (Bazett) 438 ms    P Axis 59 degrees    R Axis 2 degrees    T Axis 34 degrees    Diagnosis Sinus bradycardia  Otherwise normal ECG      Comprehensive Metabolic Panel    Collection Time: 02/19/25  4:10 AM   Result Value Ref Range    Sodium 135 (L) 136 - 145 mmol/L    Potassium 4.3 3.5 - 5.1 mmol/L    Chloride 107 97 - 108 mmol/L    CO2 22 21 - 32 mmol/L    Anion Gap 6 2 - 12 mmol/L    Glucose 91 65 - 100 mg/dL    BUN 8 6 - 20 MG/DL    Creatinine 0.48 (L) 0.55 - 1.02 MG/DL    BUN/Creatinine Ratio 17 12 - 20      Est, Glom Filt Rate >90 >60 ml/min/1.73m2    Calcium 9.2 8.5 - 10.1 MG/DL    Total Bilirubin 0.7 0.2 - 1.0 MG/DL    ALT 19 12 - 78 U/L    AST 29 15 - 37 U/L    Alk Phosphatase 68 45 - 117 U/L    Total Protein 7.2 6.4 - 8.2 g/dL    Albumin 3.2 (L) 3.5 - 5.0 g/dL    Globulin 4.0 2.0 - 4.0 g/dL    Albumin/Globulin Ratio 0.8 (L) 1.1 - 2.2     POCT Glucose    Collection Time: 02/19/25  8:04 AM   Result Value Ref Range    POC

## 2025-02-19 NOTE — CARE COORDINATION
Care Management Initial Assessment  2/19/2025 8:47 AM  If patient is discharged prior to next notation, then this note serves as note for discharge by case management.    Reason for Admission:   Epigastric pain [R10.13]         Patient Admission Status: Inpatient  Date Admitted to IN: 2/18  RUR: Readmission Risk Score: 9.4    Hospitalization in the last 30 days (Readmission):  No        Advance Care Planning:  Code Status: Full Code  Primary Healthcare Decision Maker: (P) Legal Next of Kin   Advance Directive: has NO advanced directive - not interested in additional information     __________________________________________________________________________  Assessment:      02/19/25 0846   Service Assessment   Patient Orientation Alert and Oriented   Cognition Alert   History Provided By Patient;Medical Record   Primary Caregiver Self   Support Systems Family Members;Spouse/Significant Other   Patient's Healthcare Decision Maker is: Legal Next of Kin   PCP Verified by CM Yes   Last Visit to PCP Within last 6 months   Prior Functional Level Independent in ADLs/IADLs   Current Functional Level Independent in ADLs/IADLs   Can patient return to prior living arrangement Yes   Ability to make needs known: Good   Family able to assist with home care needs: Yes   Would you like for me to discuss the discharge plan with any other family members/significant others, and if so, who? No   Financial Resources Other (Comment)  ([place] NEVES)   Community Resources None   Social/Functional History   Prior Level of Assist for ADLs Independent   Prior Level of Assist for Homemaking Independent   Ambulation Assistance Independent   Prior Level of Assist for Transfers Independent   Active  Yes   Mode of Transportation Car   Occupation Full time employment   Services At/After Discharge   Transition of Care Consult (CM Consult) N/A   Services At/After Discharge None   [place] Resource Information Provided? No   Mode of Transport

## 2025-02-19 NOTE — PROGRESS NOTES
Senior Resident Admission Note     CC: epigastric pain    HPI:  Ofe Escobar is a 44 y.o. female w/ a PMHX of gastric bypass, GJ ulcer, T2DM, HTN, HLD, STUART who presents to the ER complaining of epigastric pain onset x2 days ago. She reports associated nausea, x1 episode of NBNB vomiting. She reports viral URI symptoms over the last week with associated cough, nasal congestion, decreased PO. She notes that her epigastric pain is similar to the pain she has experienced in the past with stomach ulcers. She reports intermittent episodes of melena over the past x2 days. The pain is worse with eating. She denies chest pain or SOB.     Chart reviewed. Patient seen, examined, and discussed with Dr. Riley (PGY-1). See H&P for more details.    A/P: Admit to Telemetry. Code status: Full.  UGIB  Peptic Ulcer Disease  Presenting with acute epigastric pain and black stools for several days. PE notable for epigastric TTP. Lipase wnl 56. Hgb 13.7. Troponin elevated POA 21, 43. + COVID in ED. Patient has extensive ulcer hx s/p conversion of sleeve gastrectomy to RYGB and HHR in 2022 2/2 GERD. Prior ulceration at GJ anastomosis, not seen on last endoscopy 10/2023. Omeprazole and sucralfate qd home meds. BUN/Cr ratio 25, notably >20 indicating likely active GIB. S/p benadryl, mylanta/lidocaine GI cocktail, compazine, tramadol, 1 L NS bolus in ED with significant sx improvement.  - Admit to Medicine  - Monitor vitals per unit protocol  - Protonix 40 mg IV, s/p 80 IV in ED  - Zofran prn  - Home meds calcium citrate 600 mg, vit D 3000U  - Daily CBC, BMP  - FOBT pending  - APTT, INR  - GI c/s   - NPO for possible intervention     Elevated troponin  POA 21>43>101 iso UGIB. EKG sinus with bradycardia without obvious ischemia. Patient not endorsing CP. No significant cardiovascular hx. Patient does have hx pulmonary embolism in 2015 per chart review. S/p anticoag for 12 mo at that time. Etiology possibly demand ischemia iso UGIB and BP

## 2025-02-19 NOTE — H&P
[address] [id number]   Office [phone]  Fax [phone]       Admission H&P     Name: Ofe Escobar MRN: [id number]  Sex: Female   YOB: 1980  Age: 44 y.o.  PCP: Albin Miller MD     Source of Information: patient, medical records    Chief complaint:   Chief Complaint   Patient presents with    epigastric pain       History of Present Illness  Ofe Escobar is a 44 y.o. female with known history of GERD, PUD s/p RYGB, known ulcers of GH anastomosis, HTN, Chiari malformation s/p repair who presents to the ER with the above complaint.      Patient reports several days of epigastric pain after mild cold. Pain worse with eating food and inspiration. Consequently, patient has had minimal po intake. Associated sx include dizziness, decreased po intake and black stools. Stools are nondiarrheal. Patient had x1 episode NBNB emesis after taking carafate for known PUD. Pain became so severe today that she became diaphoretic and subsequently called EMS.    Denies recent alcohol use,ibuprofen, ASA, or BC powder.  Denies HA, blurry vision, dizziness, CP, changes in urination.       In the ER:     Vitals:  Patient Vitals for the past 8 hrs:   Temp Pulse Resp BP SpO2   02/19/25 0045 -- -- -- 134/87 98 %   02/19/25 0030 -- -- -- (!) 133/90 --   02/19/25 0015 -- -- -- 127/88 98 %   02/19/25 0000 98.2 °F (36.8 °C) 71 16 123/85 --   02/18/25 2325 98.4 °F (36.9 °C) 63 12 (!) 148/87 100 %   02/18/25 2300 -- 65 13 (!) 148/86 99 %   02/18/25 2230 -- 66 13 (!) 156/86 99 %   02/18/25 2200 -- 67 14 (!) 169/80 100 %   02/18/25 2130 -- 84 16 (!) 158/75 100 %   02/18/25 2125 98.1 °F (36.7 °C) 60 20 (!) 188/81 99 %   02/18/25 2030 -- 71 18 (!) 190/96 100 %   02/18/25 2015 -- 62 18 (!) 198/93 98 %   02/18/25 2001 -- 68 24 (!) 206/110 98 %   02/18/25 2000 -- -- 22 (!) 206/110 98 %   02/18/25 1814 97.6 °F (36.4 °C) 58 24 (!) 225/116 99 %         Labs:   Recent Labs     02/18/25  1826   WBC 9.0

## 2025-02-19 NOTE — ED NOTES
Patient reports she is feeling better, patient resting on stretcher with family at bedside call light in reach, no requests or concerns at this time. Updated on plan of care and transfer.

## 2025-02-20 ENCOUNTER — TELEPHONE (OUTPATIENT)
Age: 45
End: 2025-02-20

## 2025-02-20 NOTE — TELEPHONE ENCOUNTER
Care Transitions Initial Follow Up Call    Outreach made within 2 business days of discharge: Yes    Patient: Ofe Escobar Patient : 1980   MRN: [id number]  Reason for Admission: abdominal pain  Discharge Date: 25       Spoke with: l/sohail on  to call back to do DAVID and schedule.     Discharge department/facility: [facility]    Follow Up  No future appointments.    YUNIOR SMITH MA

## 2025-04-28 DIAGNOSIS — E78.2 MODERATE MIXED HYPERLIPIDEMIA NOT REQUIRING STATIN THERAPY: ICD-10-CM

## 2025-04-28 RX ORDER — ATORVASTATIN CALCIUM 40 MG/1
40 TABLET, FILM COATED ORAL DAILY
Qty: 90 TABLET | Refills: 1 | Status: SHIPPED | OUTPATIENT
Start: 2025-04-28

## 2025-05-19 ENCOUNTER — TELEPHONE (OUTPATIENT)
Age: 45
End: 2025-05-19

## 2025-05-19 NOTE — TELEPHONE ENCOUNTER
Rx refill request:    omeprazole (PRILOSEC) 40 MG delayed release capsule [[id number]]          [facility] - [place], [place] - [address] - [place] [phone] - F [phone]  [address] [facility] [facility] [address]  Phone: [phone]  Fax: [phone]

## 2025-05-21 ENCOUNTER — TELEPHONE (OUTPATIENT)
Age: 45
End: 2025-05-21

## 2025-05-21 NOTE — TELEPHONE ENCOUNTER
PT called in response to a VM left for her stating she needed to come in for a BP check before Dr Miller would refill her RX. I reiterated what was left on the VM. She was very angry and loud and hung up on me. Not sure if she wants me to cancel her appt on the 30th, as before she hung up she said to cancel the appt. I offered to send another note to the nurse, but stated it would not change Dr Miller decision, and that's when she hung up.

## 2025-05-30 ENCOUNTER — OFFICE VISIT (OUTPATIENT)
Age: 45
End: 2025-05-30
Payer: COMMERCIAL

## 2025-05-30 ENCOUNTER — LAB (OUTPATIENT)
Age: 45
End: 2025-05-30

## 2025-05-30 VITALS
HEART RATE: 70 BPM | SYSTOLIC BLOOD PRESSURE: 130 MMHG | WEIGHT: 158 LBS | BODY MASS INDEX: 29.08 KG/M2 | DIASTOLIC BLOOD PRESSURE: 72 MMHG | HEIGHT: 62 IN | TEMPERATURE: 98.4 F | OXYGEN SATURATION: 96 % | RESPIRATION RATE: 16 BRPM

## 2025-05-30 DIAGNOSIS — F41.9 ANXIETY: ICD-10-CM

## 2025-05-30 DIAGNOSIS — I10 ESSENTIAL (PRIMARY) HYPERTENSION: ICD-10-CM

## 2025-05-30 DIAGNOSIS — J45.20 MILD INTERMITTENT REACTIVE AIRWAY DISEASE WITHOUT COMPLICATION: ICD-10-CM

## 2025-05-30 DIAGNOSIS — Z12.31 ENCOUNTER FOR SCREENING MAMMOGRAM FOR MALIGNANT NEOPLASM OF BREAST: ICD-10-CM

## 2025-05-30 DIAGNOSIS — N95.1 VASOMOTOR SYMPTOMS DUE TO MENOPAUSE: ICD-10-CM

## 2025-05-30 DIAGNOSIS — E11.29 TYPE 2 DIABETES MELLITUS WITH MICROALBUMINURIA (HCC): Primary | ICD-10-CM

## 2025-05-30 DIAGNOSIS — R80.9 TYPE 2 DIABETES MELLITUS WITH MICROALBUMINURIA (HCC): ICD-10-CM

## 2025-05-30 DIAGNOSIS — K21.9 GASTROESOPHAGEAL REFLUX DISEASE WITHOUT ESOPHAGITIS: ICD-10-CM

## 2025-05-30 DIAGNOSIS — E66.3 OVERWEIGHT (BMI 25.0-29.9): ICD-10-CM

## 2025-05-30 DIAGNOSIS — E11.29 TYPE 2 DIABETES MELLITUS WITH MICROALBUMINURIA (HCC): ICD-10-CM

## 2025-05-30 DIAGNOSIS — J20.9 ACUTE BRONCHITIS, UNSPECIFIED ORGANISM: ICD-10-CM

## 2025-05-30 DIAGNOSIS — R80.9 TYPE 2 DIABETES MELLITUS WITH MICROALBUMINURIA (HCC): Primary | ICD-10-CM

## 2025-05-30 PROCEDURE — 99215 OFFICE O/P EST HI 40 MIN: CPT | Performed by: FAMILY MEDICINE

## 2025-05-30 PROCEDURE — 3075F SYST BP GE 130 - 139MM HG: CPT | Performed by: FAMILY MEDICINE

## 2025-05-30 PROCEDURE — 3078F DIAST BP <80 MM HG: CPT | Performed by: FAMILY MEDICINE

## 2025-05-30 RX ORDER — LISINOPRIL 2.5 MG/1
2.5 TABLET ORAL DAILY
Qty: 90 TABLET | Refills: 1 | Status: SHIPPED | OUTPATIENT
Start: 2025-05-30

## 2025-05-30 RX ORDER — BUPROPION HYDROCHLORIDE 150 MG/1
150 TABLET ORAL EVERY MORNING
Qty: 30 TABLET | Refills: 2 | Status: SHIPPED | OUTPATIENT
Start: 2025-05-30

## 2025-05-30 RX ORDER — PREDNISONE 50 MG/1
50 TABLET ORAL DAILY
Qty: 5 TABLET | Refills: 0 | Status: SHIPPED | OUTPATIENT
Start: 2025-05-30 | End: 2025-06-04

## 2025-05-30 RX ORDER — ALBUTEROL SULFATE 90 UG/1
2 INHALANT RESPIRATORY (INHALATION) EVERY 4 HOURS PRN
Qty: 18 G | Refills: 3 | Status: SHIPPED | OUTPATIENT
Start: 2025-05-30

## 2025-05-30 RX ORDER — LEVOFLOXACIN 750 MG/1
750 TABLET, FILM COATED ORAL DAILY
Qty: 10 TABLET | Refills: 0 | Status: SHIPPED | OUTPATIENT
Start: 2025-05-30 | End: 2025-06-09

## 2025-05-30 RX ORDER — OMEPRAZOLE 40 MG/1
40 CAPSULE, DELAYED RELEASE ORAL DAILY
Qty: 90 CAPSULE | Refills: 1 | Status: SHIPPED | OUTPATIENT
Start: 2025-05-30

## 2025-05-30 SDOH — ECONOMIC STABILITY: FOOD INSECURITY: WITHIN THE PAST 12 MONTHS, YOU WORRIED THAT YOUR FOOD WOULD RUN OUT BEFORE YOU GOT MONEY TO BUY MORE.: NEVER TRUE

## 2025-05-30 SDOH — ECONOMIC STABILITY: FOOD INSECURITY: WITHIN THE PAST 12 MONTHS, THE FOOD YOU BOUGHT JUST DIDN'T LAST AND YOU DIDN'T HAVE MONEY TO GET MORE.: NEVER TRUE

## 2025-05-30 ASSESSMENT — PATIENT HEALTH QUESTIONNAIRE - PHQ9
SUM OF ALL RESPONSES TO PHQ QUESTIONS 1-9: 0
2. FEELING DOWN, DEPRESSED OR HOPELESS: NOT AT ALL
SUM OF ALL RESPONSES TO PHQ QUESTIONS 1-9: 0
1. LITTLE INTEREST OR PLEASURE IN DOING THINGS: NOT AT ALL
SUM OF ALL RESPONSES TO PHQ QUESTIONS 1-9: 0
SUM OF ALL RESPONSES TO PHQ QUESTIONS 1-9: 0

## 2025-05-30 NOTE — PROGRESS NOTES
Identified pt with two pt identifiers(name and )    Chief Complaint   Patient presents with    Blood Pressure Check    Medication Refill     Pt has been experiencing anger due to menopause.         Health Maintenance Due   Topic    Varicella vaccine (1 of  - 13+ 2-dose series)    Hepatitis B vaccine (1 of 3 - + 3-dose series)    Diabetic retinal exam     Breast cancer screen     COVID-19 Vaccine ( season)    Diabetic foot exam     Diabetic Alb to Cr ratio (uACR) test     Lipids     Cervical cancer screen        Wt Readings from Last 3 Encounters:   25 71.7 kg (158 lb)   10/25/24 69.4 kg (153 lb)   24 67.7 kg (149 lb 3.2 oz)     Temp Readings from Last 3 Encounters:   25 98.4 °F (36.9 °C) (Temporal)   25 99.2 °F (37.3 °C) (Oral)   10/25/24 98 °F (36.7 °C) (Temporal)     BP Readings from Last 3 Encounters:   25 130/72   25 (!) 143/79   10/25/24 130/70     Pulse Readings from Last 3 Encounters:   25 70   25 60   10/25/24 70           Depression Screening:  :         2025    10:27 AM 10/25/2024     3:16 PM 2024     3:26 PM 2024     3:03 PM 2023     9:00 AM 2022     2:00 PM 2022    10:00 AM   PHQ-9 Questionaire   Little interest or pleasure in doing things 0 0 0 0 0 0 0   Feeling down, depressed, or hopeless 0 0 0 0 0 0 0   PHQ-9 Total Score 0 0 0 0 0 0 0        Fall Risk Assessment:  :          No data to display                 Abuse Screening:  :          No data to display                 Coordination of Care Questionnaire:  :     \"Have you been to the ER, urgent care clinic since your last visit?  Hospitalized since your last visit?\"    NO    “Have you seen or consulted any other health care providers outside our system since your last visit?”    NO    Have you had a mammogram?”   NO    Date of last Mammogram: 6/10/2022       “Have you had a diabetic eye exam?”    NO     Date of last diabetic eye exam: 2020       Click

## 2025-05-30 NOTE — PROGRESS NOTES
[address] [place] [address]  Phone: [phone]  Fax: [phone]        Chief Complaint   Patient presents with    Blood Pressure Check    Medication Refill     Pt has been experiencing anger due to menopause.      She is a 44 y.o. female who presents for follow up visit.  I last saw her in 10/2024. No showed since this appt.    She was admitted to [facility] from 2/18 - 2/19 (records reviewed) for epigastric abdominal pain and black stools.  Lipase was normal.  Thought that she may have bleeding ulcer.  She was recommended for admission for GI evaluation, but she left AMA.  Also had elevated troponin that was trending upwards at the time of visit.  No chest pain.    She tells me that her pain has resolved since this time. Ultimately determined that she had COVID-19 that was causing these symptoms.  Has not had recurrence of abdominal pain or dark stools since hospital discharge.    Presents for DM follow up. Taking all medications as directed with no side effects.  Following DM diet as best as possible.  Denies polyuria, polydipsia, polyphagia.  Blood sugar checks: 70s-90s fasting.    Presents for HTN follow up.  Taking medications as prescribed and reports no side effects.  Denies chest pain, headache, visual disturbances.  Does not check BP at home.    Started 6 days ago with increased cough, congestion.  Exposed to nephew with bronchitis.  Seen at .  She was treated with zpack.  Not really helping.  She is on day 3 of this.  Does not feel herself wheezing.  There is no fever.  Has also been taking OTC cough and cold medications. Nothing really helping.  Albuterol helps some.    She is complaining of increased anger issues that she relates to menopause.  Has been using some supplements for this.  Just feels angry and frustrated all the time.  There is associated anxiety.   She used to take anxiety medication in the past--does not remember what that was.  Says that

## 2025-05-31 LAB
ALBUMIN SERPL-MCNC: 3.6 G/DL (ref 3.5–5)
ALBUMIN/GLOB SERPL: 1 (ref 1.1–2.2)
ALP SERPL-CCNC: 73 U/L (ref 45–117)
ALT SERPL-CCNC: 20 U/L (ref 12–78)
ANION GAP SERPL CALC-SCNC: 3 MMOL/L (ref 2–12)
AST SERPL-CCNC: 16 U/L (ref 15–37)
BASOPHILS # BLD: 0 K/UL (ref 0–0.1)
BASOPHILS NFR BLD: 0 % (ref 0–1)
BILIRUB SERPL-MCNC: 0.4 MG/DL (ref 0.2–1)
BUN SERPL-MCNC: 14 MG/DL (ref 6–20)
BUN/CREAT SERPL: 26 (ref 12–20)
CALCIUM SERPL-MCNC: 9.6 MG/DL (ref 8.5–10.1)
CHLORIDE SERPL-SCNC: 109 MMOL/L (ref 97–108)
CHOLEST SERPL-MCNC: 162 MG/DL
CO2 SERPL-SCNC: 26 MMOL/L (ref 21–32)
CREAT SERPL-MCNC: 0.53 MG/DL (ref 0.55–1.02)
CREAT UR-MCNC: 101 MG/DL
DIFFERENTIAL METHOD BLD: ABNORMAL
EOSINOPHIL # BLD: 0 K/UL (ref 0–0.4)
EOSINOPHIL NFR BLD: 0 % (ref 0–7)
ERYTHROCYTE [DISTWIDTH] IN BLOOD BY AUTOMATED COUNT: 14.6 % (ref 11.5–14.5)
EST. AVERAGE GLUCOSE BLD GHB EST-MCNC: 103 MG/DL
GLOBULIN SER CALC-MCNC: 3.5 G/DL (ref 2–4)
GLUCOSE SERPL-MCNC: 85 MG/DL (ref 65–100)
HBA1C MFR BLD: 5.2 % (ref 4–5.6)
HCT VFR BLD AUTO: 43.4 % (ref 35–47)
HDLC SERPL-MCNC: 56 MG/DL
HDLC SERPL: 2.9 (ref 0–5)
HGB BLD-MCNC: 13 G/DL (ref 11.5–16)
IMM GRANULOCYTES # BLD AUTO: 0 K/UL
IMM GRANULOCYTES NFR BLD AUTO: 0 %
LDLC SERPL CALC-MCNC: 93.6 MG/DL (ref 0–100)
LYMPHOCYTES # BLD: 1.89 K/UL (ref 0.8–3.5)
LYMPHOCYTES NFR BLD: 30 % (ref 12–49)
MCH RBC QN AUTO: 25 PG (ref 26–34)
MCHC RBC AUTO-ENTMCNC: 30 G/DL (ref 30–36.5)
MCV RBC AUTO: 83.3 FL (ref 80–99)
MICROALBUMIN UR-MCNC: 5.17 MG/DL
MICROALBUMIN/CREAT UR-RTO: 51 MG/G (ref 0–30)
MONOCYTES # BLD: 0.82 K/UL (ref 0–1)
MONOCYTES NFR BLD: 13 % (ref 5–13)
NEUTS BAND NFR BLD MANUAL: 2 % (ref 0–6)
NEUTS SEG # BLD: 3.59 K/UL (ref 1.8–8)
NEUTS SEG NFR BLD: 55 % (ref 32–75)
NRBC # BLD: 0 K/UL (ref 0–0.01)
NRBC BLD-RTO: 0 PER 100 WBC
PLATELET # BLD AUTO: 356 K/UL (ref 150–400)
PMV BLD AUTO: 10.9 FL (ref 8.9–12.9)
POTASSIUM SERPL-SCNC: 5.8 MMOL/L (ref 3.5–5.1)
PROT SERPL-MCNC: 7.1 G/DL (ref 6.4–8.2)
RBC # BLD AUTO: 5.21 M/UL (ref 3.8–5.2)
RBC MORPH BLD: ABNORMAL
SODIUM SERPL-SCNC: 138 MMOL/L (ref 136–145)
TRIGL SERPL-MCNC: 62 MG/DL
TSH SERPL DL<=0.05 MIU/L-ACNC: 0.55 UIU/ML (ref 0.36–3.74)
VLDLC SERPL CALC-MCNC: 12.4 MG/DL
WBC # BLD AUTO: 6.3 K/UL (ref 3.6–11)
WBC MORPH BLD: ABNORMAL

## 2025-06-02 ENCOUNTER — RESULTS FOLLOW-UP (OUTPATIENT)
Age: 45
End: 2025-06-02

## 2025-06-02 DIAGNOSIS — E87.5 HYPERKALEMIA: Primary | ICD-10-CM

## 2025-06-02 NOTE — TELEPHONE ENCOUNTER
----- Message from MARVIN MCINTOSH MA sent at 6/2/2025  3:51 PM EDT -----  Results reviewed with patient and they verbalized understanding   Please put pt on lab schedule at 8am 6-3-25   Pt states she was put on lisinopril for her kidneys.

## 2025-06-03 ENCOUNTER — LAB (OUTPATIENT)
Age: 45
End: 2025-06-03

## 2025-06-03 DIAGNOSIS — E87.5 HYPERKALEMIA: ICD-10-CM

## 2025-06-04 ENCOUNTER — RESULTS FOLLOW-UP (OUTPATIENT)
Age: 45
End: 2025-06-04

## 2025-06-04 LAB
ANION GAP SERPL CALC-SCNC: 8 MMOL/L (ref 2–12)
BUN SERPL-MCNC: 13 MG/DL (ref 6–20)
BUN/CREAT SERPL: 23 (ref 12–20)
CALCIUM SERPL-MCNC: 9.4 MG/DL (ref 8.5–10.1)
CHLORIDE SERPL-SCNC: 104 MMOL/L (ref 97–108)
CO2 SERPL-SCNC: 27 MMOL/L (ref 21–32)
CREAT SERPL-MCNC: 0.57 MG/DL (ref 0.55–1.02)
GLUCOSE SERPL-MCNC: 82 MG/DL (ref 65–100)
POTASSIUM SERPL-SCNC: 4.9 MMOL/L (ref 3.5–5.1)
SODIUM SERPL-SCNC: 139 MMOL/L (ref 136–145)

## 2025-06-05 ENCOUNTER — TELEPHONE (OUTPATIENT)
Age: 45
End: 2025-06-05

## 2025-06-05 ENCOUNTER — TELEMEDICINE (OUTPATIENT)
Age: 45
End: 2025-06-05
Payer: COMMERCIAL

## 2025-06-05 DIAGNOSIS — R00.2 PALPITATIONS: ICD-10-CM

## 2025-06-05 DIAGNOSIS — Z79.899 NEW MEDICATION ADDED: ICD-10-CM

## 2025-06-05 DIAGNOSIS — N95.1 MENOPAUSAL SYMPTOMS: ICD-10-CM

## 2025-06-05 DIAGNOSIS — R42 DIZZINESS: ICD-10-CM

## 2025-06-05 DIAGNOSIS — R03.0 ELEVATED BLOOD-PRESSURE READING WITHOUT DIAGNOSIS OF HYPERTENSION: Primary | ICD-10-CM

## 2025-06-05 DIAGNOSIS — G44.039 NONINTRACTABLE PAROXYSMAL HEMICRANIA, UNSPECIFIED CHRONICITY PATTERN: ICD-10-CM

## 2025-06-05 PROCEDURE — 99213 OFFICE O/P EST LOW 20 MIN: CPT | Performed by: INTERNAL MEDICINE

## 2025-06-05 RX ORDER — BUPROPION HYDROCHLORIDE 75 MG/1
TABLET ORAL
Qty: 67 TABLET | Refills: 0 | Status: SHIPPED | OUTPATIENT
Start: 2025-06-05 | End: 2025-07-12

## 2025-06-05 ASSESSMENT — PATIENT HEALTH QUESTIONNAIRE - PHQ9
5. POOR APPETITE OR OVEREATING: NOT AT ALL
8. MOVING OR SPEAKING SO SLOWLY THAT OTHER PEOPLE COULD HAVE NOTICED. OR THE OPPOSITE, BEING SO FIGETY OR RESTLESS THAT YOU HAVE BEEN MOVING AROUND A LOT MORE THAN USUAL: NOT AT ALL
SUM OF ALL RESPONSES TO PHQ QUESTIONS 1-9: 0
4. FEELING TIRED OR HAVING LITTLE ENERGY: NOT AT ALL
SUM OF ALL RESPONSES TO PHQ QUESTIONS 1-9: 0
6. FEELING BAD ABOUT YOURSELF - OR THAT YOU ARE A FAILURE OR HAVE LET YOURSELF OR YOUR FAMILY DOWN: NOT AT ALL
7. TROUBLE CONCENTRATING ON THINGS, SUCH AS READING THE NEWSPAPER OR WATCHING TELEVISION: NOT AT ALL
2. FEELING DOWN, DEPRESSED OR HOPELESS: NOT AT ALL
10. IF YOU CHECKED OFF ANY PROBLEMS, HOW DIFFICULT HAVE THESE PROBLEMS MADE IT FOR YOU TO DO YOUR WORK, TAKE CARE OF THINGS AT HOME, OR GET ALONG WITH OTHER PEOPLE: NOT DIFFICULT AT ALL
9. THOUGHTS THAT YOU WOULD BE BETTER OFF DEAD, OR OF HURTING YOURSELF: NOT AT ALL
3. TROUBLE FALLING OR STAYING ASLEEP: NOT AT ALL
1. LITTLE INTEREST OR PLEASURE IN DOING THINGS: NOT AT ALL

## 2025-06-05 ASSESSMENT — ENCOUNTER SYMPTOMS: SHORTNESS OF BREATH: 1

## 2025-06-05 NOTE — TELEPHONE ENCOUNTER
Pt has been experiencing  light headedness, heart palpations, and dizziness for the past 2 days. Last night her BP was 199/99.  Pt took an extra lisinopril last night around midnight because she was afraid and did not know if she should go to the hospital. Her BP this morning 160/89.She is still experiencing the above symptoms. She is requesting to speak with a nurse.

## 2025-06-05 NOTE — PROGRESS NOTES
Identified pt with two pt identifiers(name and )    Chief Complaint   Patient presents with    Hypertension     Pt has been having BP for a few days  Most recent reading was a few hours ago at 158/62.  Pt states she was having dizziness, heart palpitations, and SOB        Health Maintenance Due   Topic    Varicella vaccine (1 of 2 - 13+ 2-dose series)    Hepatitis B vaccine (1 of 3 - + 3-dose series)    Diabetic retinal exam     Breast cancer screen     COVID-19 Vaccine ( season)    Cervical cancer screen        Wt Readings from Last 3 Encounters:   25 71.7 kg (158 lb)   10/25/24 69.4 kg (153 lb)   24 67.7 kg (149 lb 3.2 oz)     Temp Readings from Last 3 Encounters:   25 98.4 °F (36.9 °C) (Temporal)   25 99.2 °F (37.3 °C) (Oral)   10/25/24 98 °F (36.7 °C) (Temporal)     BP Readings from Last 3 Encounters:   25 130/72   25 (!) 143/79   10/25/24 130/70     Pulse Readings from Last 3 Encounters:   25 70   25 60   10/25/24 70           Depression Screening:  :         2025     1:34 PM 2025    10:27 AM 10/25/2024     3:16 PM 2024     3:26 PM 2024     3:03 PM 2023     9:00 AM 2022     2:00 PM   PHQ-9 Questionaire   Little interest or pleasure in doing things 0 0 0 0 0 0 0   Feeling down, depressed, or hopeless 0 0 0 0 0 0 0   Trouble falling or staying asleep, or sleeping too much 0         Feeling tired or having little energy 0         Poor appetite or overeating 0         Feeling bad about yourself - or that you are a failure or have let yourself or your family down 0         Trouble concentrating on things, such as reading the newspaper or watching television 0         Moving or speaking so slowly that other people could have noticed. Or the opposite - being so fidgety or restless that you have been moving around a lot more than usual 0         Thoughts that you would be better off dead, or of hurting yourself in some way 0

## 2025-06-05 NOTE — PROGRESS NOTES
6/5/2025    TELEHEALTH EVALUATION -- Audio/Visual    CC:  Chief Complaint   Patient presents with    Hypertension     Pt has been having bp.  Recent readings;    158/62    Pt states she was having dizziness, heart palpitations, and SOB       ASSESSMENT/PLAN:  1. Elevated blood-pressure reading without diagnosis of hypertension   May have new onset hypertension that needs further evaluation and treatment.    Currently on Lisinopril. Advised to take today. Took last night, so did not take today.    She has a very low dose. Need careful follow up.   2. Menopausal symptoms  -     buPROPion (WELLBUTRIN) 75 MG tablet; Take 1 tablet by mouth daily for 7 days, THEN 1 tablet 2 times daily., Disp-67 tablet, R-0Normal   Will decrease the on boarding of medication. If still has symptoms, may need alternative medication for symptoms.   3. Nonintractable paroxysmal hemicrania, unspecified chronicity pattern   Due more than likely to elevated BP or side effect of medication.   4. Palpitations   Unclear etiology, but more likely than not related to hypertensive episode.   5. Dizziness   May be due to the hypertensive episode or side effect of medication.   6. New medication added    Adjusted dose of Wellbutrin.     Strongly encouraged to go to the ER if symptoms recrudesces or worsen.   Take Lisinopril as soon as she goes home.   Call office tomorrow with the BP results or ER.   Follow up with PCP in the future for ongoing evaluation of possible primary hypertension.     Pt verbalizes understanding of plan of care and denies further questions or concerns at this time.    Ofe Escobar, was evaluated through a synchronous (real-time) audio-video encounter. The patient (or guardian if applicable) is aware that this is a billable service, which includes applicable co-pays. This Virtual Visit was conducted with patient's (and/or legal guardian's) consent. Patient identification was verified, and a caregiver was present when

## 2025-07-16 ENCOUNTER — TELEMEDICINE (OUTPATIENT)
Age: 45
End: 2025-07-16
Payer: COMMERCIAL

## 2025-07-16 DIAGNOSIS — N95.1 MENOPAUSAL SYMPTOMS: Primary | ICD-10-CM

## 2025-07-16 DIAGNOSIS — F41.9 ANXIETY: ICD-10-CM

## 2025-07-16 PROCEDURE — 99213 OFFICE O/P EST LOW 20 MIN: CPT | Performed by: FAMILY MEDICINE

## 2025-07-16 RX ORDER — BUPROPION HYDROCHLORIDE 75 MG/1
75 TABLET ORAL 2 TIMES DAILY
Qty: 180 TABLET | Refills: 1 | Status: SHIPPED | OUTPATIENT
Start: 2025-07-16

## 2025-07-16 ASSESSMENT — PATIENT HEALTH QUESTIONNAIRE - PHQ9
SUM OF ALL RESPONSES TO PHQ QUESTIONS 1-9: 0
1. LITTLE INTEREST OR PLEASURE IN DOING THINGS: NOT AT ALL
SUM OF ALL RESPONSES TO PHQ QUESTIONS 1-9: 0
SUM OF ALL RESPONSES TO PHQ QUESTIONS 1-9: 0
2. FEELING DOWN, DEPRESSED OR HOPELESS: NOT AT ALL
SUM OF ALL RESPONSES TO PHQ QUESTIONS 1-9: 0

## 2025-07-16 NOTE — PROGRESS NOTES
Identified pt with two pt identifiers(name and )    Chief Complaint   Patient presents with    Anxiety     Patient is being seen for menopausal symptoms follow up and the start of anxiety medication from last visit and is doing better since last visit.        Health Maintenance Due   Topic    Varicella vaccine (1 of  - 13+ 2-dose series)    Hepatitis B vaccine (1 of 3 - 19+ 3-dose series)    Diabetic retinal exam     Breast cancer screen     COVID-19 Vaccine ( season)    Cervical cancer screen        Wt Readings from Last 3 Encounters:   25 71.7 kg (158 lb)   10/25/24 69.4 kg (153 lb)   24 67.7 kg (149 lb 3.2 oz)     Temp Readings from Last 3 Encounters:   25 98.4 °F (36.9 °C) (Temporal)   25 99.2 °F (37.3 °C) (Oral)   10/25/24 98 °F (36.7 °C) (Temporal)     BP Readings from Last 3 Encounters:   25 130/72   25 (!) 143/79   10/25/24 130/70     Pulse Readings from Last 3 Encounters:   25 70   25 60   10/25/24 70           Depression Screening:  :         2025     8:00 AM 2025     1:34 PM 2025    10:27 AM 10/25/2024     3:16 PM 2024     3:26 PM 2024     3:03 PM 2023     9:00 AM   PHQ-9 Questionaire   Little interest or pleasure in doing things 0 0 0 0 0 0 0   Feeling down, depressed, or hopeless 0 0 0 0 0 0 0   Trouble falling or staying asleep, or sleeping too much  0        Feeling tired or having little energy  0        Poor appetite or overeating  0        Feeling bad about yourself - or that you are a failure or have let yourself or your family down  0        Trouble concentrating on things, such as reading the newspaper or watching television  0        Moving or speaking so slowly that other people could have noticed. Or the opposite - being so fidgety or restless that you have been moving around a lot more than usual  0        Thoughts that you would be better off dead, or of hurting yourself in some way  0        PHQ-9

## 2025-07-16 NOTE — PROGRESS NOTES
[facility]  [address] [place] [address]  Phone: [phone]  Fax: [phone]      Ofe Escobar, was evaluated through a synchronous (real-time) audio-video encounter. The patient (or guardian if applicable) is aware that this is a billable service, which includes applicable co-pays. This Virtual Visit was conducted with patient's (and/or legal guardian's) consent. Patient identification was verified, and a caregiver was present when appropriate.   The patient was located at Home: [address] [place] [id number]  Provider was located at Facility (Appt Dept): [address] [place] [address]  Confirm you are appropriately licensed, registered, or certified to deliver care in the state where the patient is located as indicated above. If you are not or unsure, please re-schedule the visit: Yes, I confirm.       Chief Complaint   Patient presents with    Anxiety     Patient is being seen for menopausal symptoms follow up and the start of anxiety medication from last visit and is doing better since last visit.     She is a 44 y.o. female who presents for virtual follow up visit.  I saw her on 5/30 for vasomotor symtpoms of menopause. Started on wellbutrin.  See notes for full detaisl.  She saw Dr. Jarvis for virtual follow up on 6/5 as seh was concerned abotu elevated blood pressure readings, dizziness, palpitations.  The dose of wellbutrin was adjusted ad she was asked to follow up with me.    Reports that she has been doing very well on the current dose of wellbutrin (75mg BID).  Would like to continue this. Not having the side effects that she was having with the 150mg XL dose.  Anxiety has been much improved. Less angry.  Has been under a lot of stress recently as  was in the hospital.  Has been coping with this well.    Says that she has been checking her blood pressure at home. Has been 135/70 on most recent check.    Reviewed PmHx, RxHx,